# Patient Record
Sex: FEMALE | Race: WHITE | NOT HISPANIC OR LATINO | Employment: FULL TIME | URBAN - METROPOLITAN AREA
[De-identification: names, ages, dates, MRNs, and addresses within clinical notes are randomized per-mention and may not be internally consistent; named-entity substitution may affect disease eponyms.]

---

## 2018-02-22 ENCOUNTER — TELEPHONE (OUTPATIENT)
Dept: INTERNAL MEDICINE | Facility: CLINIC | Age: 48
End: 2018-02-22

## 2018-02-22 DIAGNOSIS — E78.00 PURE HYPERCHOLESTEROLEMIA: ICD-10-CM

## 2018-02-22 DIAGNOSIS — Z12.11 SCREEN FOR COLON CANCER: ICD-10-CM

## 2018-02-22 DIAGNOSIS — Z87.39 HISTORY OF GOUT: Primary | ICD-10-CM

## 2018-02-22 NOTE — TELEPHONE ENCOUNTER
----- Message from Kayce Morel sent at 2/21/2018  4:11 PM CST -----  Contact: self  Pt has an appt on Tuesday, 2/20/18.  She just had a CMP, SED RATE, CBC, HEMOGLOBIN, AND HEMATOCRIT done on 2/7/18.  What other labs does Dr Alonso want her to have done prior to her appt.  Please fax to [facility].

## 2018-02-23 LAB
CHOLEST SERPL-MCNC: 125 MG/DL
CHOLEST/HDLC SERPL: 3.5 (CALC)
COPY(IES) SENT TO:: NORMAL
HDLC SERPL-MCNC: 36 MG/DL
LDLC SERPL CALC-MCNC: 63 MG/DL (CALC)
NONHDLC SERPL-MCNC: 89 MG/DL (CALC)
TRIGL SERPL-MCNC: 182 MG/DL
URATE SERPL-MCNC: 4.9 MG/DL (ref 2.5–7)

## 2018-02-26 DIAGNOSIS — I10 HYPERTENSION, UNSPECIFIED TYPE: Primary | ICD-10-CM

## 2018-02-26 RX ORDER — LISINOPRIL AND HYDROCHLOROTHIAZIDE 10; 12.5 MG/1; MG/1
1 TABLET ORAL DAILY
Qty: 30 TABLET | Refills: 0 | Status: SHIPPED | OUTPATIENT
Start: 2018-02-26 | End: 2018-02-27 | Stop reason: SDUPTHER

## 2018-02-26 RX ORDER — LISINOPRIL 20 MG/1
20 TABLET ORAL DAILY
Qty: 30 TABLET | Refills: 0 | Status: CANCELLED | OUTPATIENT
Start: 2018-02-26

## 2018-02-26 NOTE — TELEPHONE ENCOUNTER
----- Message from Kayce Morel sent at 2/26/2018  9:02 AM CST -----  Contact: self  Pt lost her presc for lisinopril hctz.  Has been out for 2 days.  Can you please send an emergency refill for just 30 pills to [facility].

## 2018-02-26 NOTE — TELEPHONE ENCOUNTER
Start Lisinopril-Hydrochlorothiazide Tablet, 10-12.5 MG, 1 tablet, Orally, Once a day, 90 days, 90 Tablet, Refills 1

## 2018-02-27 ENCOUNTER — OFFICE VISIT (OUTPATIENT)
Dept: INTERNAL MEDICINE | Facility: CLINIC | Age: 48
End: 2018-02-27
Payer: COMMERCIAL

## 2018-02-27 VITALS
OXYGEN SATURATION: 97 % | SYSTOLIC BLOOD PRESSURE: 132 MMHG | HEART RATE: 93 BPM | HEIGHT: 62 IN | RESPIRATION RATE: 16 BRPM | WEIGHT: 249.38 LBS | BODY MASS INDEX: 45.89 KG/M2 | DIASTOLIC BLOOD PRESSURE: 84 MMHG

## 2018-02-27 DIAGNOSIS — F41.1 GAD (GENERALIZED ANXIETY DISORDER): ICD-10-CM

## 2018-02-27 DIAGNOSIS — L40.9 PSORIASIS: ICD-10-CM

## 2018-02-27 DIAGNOSIS — E78.2 MIXED HYPERLIPIDEMIA: ICD-10-CM

## 2018-02-27 DIAGNOSIS — Z23 NEED FOR TETANUS BOOSTER: ICD-10-CM

## 2018-02-27 DIAGNOSIS — Z87.39 HISTORY OF GOUT: ICD-10-CM

## 2018-02-27 DIAGNOSIS — I10 ESSENTIAL HYPERTENSION: Primary | ICD-10-CM

## 2018-02-27 DIAGNOSIS — E66.9 OBESITY, UNSPECIFIED CLASSIFICATION, UNSPECIFIED OBESITY TYPE, UNSPECIFIED WHETHER SERIOUS COMORBIDITY PRESENT: ICD-10-CM

## 2018-02-27 PROCEDURE — 3079F DIAST BP 80-89 MM HG: CPT | Mod: ,,, | Performed by: INTERNAL MEDICINE

## 2018-02-27 PROCEDURE — 3075F SYST BP GE 130 - 139MM HG: CPT | Mod: ,,, | Performed by: INTERNAL MEDICINE

## 2018-02-27 PROCEDURE — 99213 OFFICE O/P EST LOW 20 MIN: CPT | Mod: ,,, | Performed by: INTERNAL MEDICINE

## 2018-02-27 RX ORDER — ADALIMUMAB 40MG/0.8ML
KIT SUBCUTANEOUS
Refills: 3 | COMMUNITY
Start: 2018-01-12 | End: 2018-02-27

## 2018-02-27 RX ORDER — ROSUVASTATIN CALCIUM 10 MG/1
10 TABLET, COATED ORAL DAILY
Qty: 90 TABLET | Refills: 1 | Status: SHIPPED | OUTPATIENT
Start: 2018-02-27 | End: 2018-08-15 | Stop reason: SDUPTHER

## 2018-02-27 RX ORDER — FOLIC ACID 1 MG/1
1 TABLET ORAL DAILY
Qty: 90 TABLET | Refills: 1 | Status: SHIPPED | OUTPATIENT
Start: 2018-02-27 | End: 2018-08-15 | Stop reason: SDUPTHER

## 2018-02-27 RX ORDER — ROSUVASTATIN CALCIUM 20 MG/1
10 TABLET, COATED ORAL DAILY
Qty: 90 TABLET | Refills: 1 | Status: SHIPPED | OUTPATIENT
Start: 2018-02-27 | End: 2018-02-27 | Stop reason: DRUGHIGH

## 2018-02-27 RX ORDER — ALLOPURINOL 300 MG/1
300 TABLET ORAL DAILY
Qty: 90 TABLET | Refills: 1 | Status: SHIPPED | OUTPATIENT
Start: 2018-02-27 | End: 2018-08-15 | Stop reason: SDUPTHER

## 2018-02-27 RX ORDER — LISINOPRIL AND HYDROCHLOROTHIAZIDE 10; 12.5 MG/1; MG/1
1 TABLET ORAL DAILY
Qty: 30 TABLET | Refills: 0 | Status: SHIPPED | OUTPATIENT
Start: 2018-02-27 | End: 2018-03-15 | Stop reason: SDUPTHER

## 2018-02-27 RX ORDER — FOLIC ACID 1 MG/1
1 TABLET ORAL DAILY
COMMUNITY
Start: 2017-12-07 | End: 2018-02-27 | Stop reason: SDUPTHER

## 2018-02-27 RX ORDER — ALLOPURINOL 300 MG/1
300 TABLET ORAL DAILY
COMMUNITY
End: 2018-02-27 | Stop reason: SDUPTHER

## 2018-02-27 NOTE — PATIENT INSTRUCTIONS
3g fish oil daily  2g cinnamon daily  400 mg Magnesium daily  2000 mg Vitamin D3 daily  Cut down salt intake    4 Steps for Eating Healthier  Changing the way you eat can improve your health. It can lower your cholesterol and blood pressure, and help you stay at a healthy weight. Your diet doesnt have to be bland and boring to be healthy. Just watch your calories and follow these steps:    1. Eat fewer unhealthy fats  · Choose more fish and lean meats instead of fatty cuts of meat.  · Skip butter and lard, and use less margarine.  · Pass on foods that have palm, coconut, or hydrogenated oils.  · Eat fewer high-fat dairy foods like cheese, ice cream, and whole milk.  · Get a heart-healthy cookbook and try some low-fat recipes.  2. Go light on salt  · Keep the saltshaker off the table.  · Limit high-salt ingredients, such as soy sauce, bouillon, and garlic salt.  · Instead of adding salt when cooking, season your food with herbs and flavorings. Try lemon, garlic, and onion.  · Limit convenience foods, such as boxed or canned foods and restaurant food.  · Read food labels and choose lower-sodium options.  3. Limit sugar  · Pause before you add sugars to pancakes, cereal, coffee, or tea. This includes white and brown table sugar, syrup, honey, and molasses. Cut your usual amount by half.  · Use non-sugar sweeteners. Stevia, aspartame, and sucralose can satisfy a sweet tooth without adding calories.  · Swap out sugar-filled soda and other drinks. Buy sugar-free or low-calorie beverages. Remember water is always the best choice.  · Read labels and choose foods with less added sugar. Keep in mind that dairy foods and foods with fruit will have some natural sugar.  · Cut the sugar in recipes by 1/3 to 1/2. Boost the flavor with extracts like almond, vanilla, or orange. Or add spices such as cinnamon or nutmeg.  4. Eat more fiber  · Eat fresh fruits and vegetables every day.  · Boost your diet with whole grains. Go for  oats, whole-grain rice, and bran.  · Add beans and lentils to your meals.  · Drink more water to match your fiber increase. This is to help prevent constipation.  Date Last Reviewed: 5/11/2015  © 0507-5745 [employer]. [address] [place] [address], [place] [address]. All rights reserved. This information is not intended as a substitute for professional medical care. Always follow your healthcare professional's instructions.

## 2018-03-07 NOTE — PROGRESS NOTES
SUBJECTIVE:    Patient ID: Oanh Marmolejo is a 47 y.o. female.    Chief Complaint: Hyperlipidemia and Hypertension    HPI     IN FOR RECHECK- SHE IS SEEING RHEUMATOLOGY WHO IS CHANGING HER PSORIASIS RX SOON    BP --HAS BEEN GOOD--    SHE GAINED HER WEIGHT BACK AND MORE-SHE IS NOW GAMBLING WITH HER MOTHER!!!!    LABS--TRIG IS SLIGHTLY UP-183    ASKS RE HER HCTZ -DOES NOT THINK SHE IS LOSING MUCH FLUID WITH IT-BUT SHE IS NOT LIMITING HER SALT    Past Medical History:   Diagnosis Date    Arthritis     Back pain     Degenerative disc disease     LIZBETH (generalized anxiety disorder)     Gout flare     High triglycerides     History of gout     Hyperlipidemia     Hypertension     Joint pain     Obese     Pain of left calf     Psoriasis     Swelling of lower extremity      Social History     Social History    Marital status:      Spouse name: N/A    Number of children: N/A    Years of education: N/A     Occupational History    Not on file.     Social History Main Topics    Smoking status: Never Smoker    Smokeless tobacco: Never Used    Alcohol use No    Drug use: Unknown    Sexual activity: Not Currently     Other Topics Concern    Not on file     Social History Narrative    No narrative on file     Past Surgical History:   Procedure Laterality Date    NECK SURGERY       Family History   Problem Relation Age of Onset    Diabetes Father     Heart disease Father     Hyperlipidemia Father     Hypertension Father     Cancer Maternal Aunt     Cancer Paternal Uncle     Diabetes Maternal Grandmother     Diabetes Paternal Grandmother        Review of Systems   Constitutional: Negative for appetite change, chills, diaphoresis, fatigue, fever and unexpected weight change.   HENT: Negative for congestion, ear pain, hearing loss, nosebleeds, postnasal drip, sinus pain, sinus pressure, sneezing, sore throat, tinnitus, trouble swallowing and voice change.    Eyes: Negative for photophobia,  pain, itching and visual disturbance.   Respiratory: Negative for apnea, cough, chest tightness, shortness of breath, wheezing and stridor.    Cardiovascular: Positive for leg swelling (ANKLE). Negative for chest pain and palpitations.   Gastrointestinal: Negative for abdominal distention, abdominal pain, blood in stool, constipation, diarrhea, nausea and vomiting.   Endocrine: Negative for cold intolerance, heat intolerance, polydipsia and polyuria.   Genitourinary: Negative for difficulty urinating, dyspareunia, dysuria, flank pain, frequency, hematuria, menstrual problem, pelvic pain, urgency, vaginal discharge and vaginal pain.   Musculoskeletal: Negative for arthralgias, back pain, joint swelling, myalgias, neck pain and neck stiffness.   Skin: Negative for pallor.        PSORIASIS   Allergic/Immunologic: Negative for environmental allergies and food allergies.   Neurological: Positive for numbness (IN HANDS). Negative for dizziness, tremors, speech difficulty, weakness and light-headedness.   Hematological: Does not bruise/bleed easily.   Psychiatric/Behavioral: Positive for sleep disturbance (GAMBLING). Negative for agitation, confusion, decreased concentration and suicidal ideas. The patient is not nervous/anxious.           Objective:      Physical Exam   Constitutional: She is oriented to person, place, and time. She appears well-developed and well-nourished. She is cooperative. No distress.   HENT:   Head: Normocephalic and atraumatic.   Right Ear: Tympanic membrane normal.   Left Ear: Tympanic membrane normal.   Nose: Nose normal.   Mouth/Throat: Uvula is midline, oropharynx is clear and moist and mucous membranes are normal.   Eyes: Conjunctivae, EOM and lids are normal. Pupils are equal, round, and reactive to light. Right pupil is round and reactive. Left pupil is round and reactive.   Neck: Trachea normal and normal range of motion. Neck supple. No JVD present. No thyromegaly present.    Cardiovascular: Normal rate, regular rhythm, normal heart sounds and intact distal pulses.    Pulmonary/Chest: Effort normal and breath sounds normal. No tachypnea. No respiratory distress.   Abdominal: Soft. Bowel sounds are normal. There is no tenderness.   Musculoskeletal: Normal range of motion.   Lymphadenopathy:     She has no cervical adenopathy.   Neurological: She is alert and oriented to person, place, and time. She has normal strength.   Skin: Skin is warm and dry. No rash noted.   PSORIASIS SCATTERED LESIONS   Psychiatric: She has a normal mood and affect. Her speech is normal.   Nursing note and vitals reviewed.          Assessment:       1. Need for tetanus booster         Plan:           Need for tetanus booster  -     DIPH,PERTUSS,ACEL,,TET VAC,PF, ADULT (ADACEL) 2 Lf-(2.5-5-3-5 mcg)-5Lf/0.5 mL Syrg; Inject 0.5 mLs into the muscle once.  Dispense: 0.5 mL; Refill: 0    Other orders  -     allopurinol (ZYLOPRIM) 300 MG tablet; Take 1 tablet (300 mg total) by mouth once daily.  Dispense: 90 tablet; Refill: 1  -     folic acid (FOLVITE) 1 MG tablet; Take 1 tablet (1 mg total) by mouth Daily.  Dispense: 90 tablet; Refill: 1

## 2018-03-15 RX ORDER — LISINOPRIL AND HYDROCHLOROTHIAZIDE 10; 12.5 MG/1; MG/1
1 TABLET ORAL DAILY
Qty: 90 TABLET | Refills: 1 | Status: SHIPPED | OUTPATIENT
Start: 2018-03-15 | End: 2018-08-15 | Stop reason: SDUPTHER

## 2018-03-15 NOTE — TELEPHONE ENCOUNTER
----- Message from Stephanie Grijalva sent at 3/12/2018  3:55 PM CDT -----  Contact: Oanh  Pt needs 2 more months supply of lisinopril and then also a 90 day supply as well sent to [facility] on [facility]. If at all possible, she would prefer 2 90 day refills

## 2018-08-02 LAB
CHOLEST SERPL-MCNC: 137 MG/DL
CHOLEST/HDLC SERPL: 4.6 (CALC)
COPY(IES) SENT TO:: NORMAL
HDLC SERPL-MCNC: 30 MG/DL
LDLC SERPL CALC-MCNC: 79 MG/DL (CALC)
NONHDLC SERPL-MCNC: 107 MG/DL (CALC)
TRIGL SERPL-MCNC: 182 MG/DL
URATE SERPL-MCNC: 6 MG/DL (ref 2.5–7)

## 2018-08-09 ENCOUNTER — TELEPHONE (OUTPATIENT)
Dept: INTERNAL MEDICINE | Facility: CLINIC | Age: 48
End: 2018-08-09

## 2018-08-09 NOTE — TELEPHONE ENCOUNTER
----- Message from Yahaira Alonso MD sent at 8/5/2018  5:16 PM CDT -----  Please call the patient regarding her abnormal result.increase stati  To 20 mg and pend order for the 20 mg dose0-take omega 3 fish oils 3 grams daily

## 2018-08-15 ENCOUNTER — OFFICE VISIT (OUTPATIENT)
Dept: INTERNAL MEDICINE | Facility: CLINIC | Age: 48
End: 2018-08-15
Payer: COMMERCIAL

## 2018-08-15 VITALS
RESPIRATION RATE: 18 BRPM | DIASTOLIC BLOOD PRESSURE: 90 MMHG | OXYGEN SATURATION: 98 % | HEART RATE: 104 BPM | WEIGHT: 242.81 LBS | SYSTOLIC BLOOD PRESSURE: 140 MMHG | BODY MASS INDEX: 44.68 KG/M2 | HEIGHT: 62 IN

## 2018-08-15 DIAGNOSIS — L40.9 PSORIASIS: ICD-10-CM

## 2018-08-15 DIAGNOSIS — Z87.39 HISTORY OF GOUT: ICD-10-CM

## 2018-08-15 DIAGNOSIS — I10 ESSENTIAL HYPERTENSION: Primary | ICD-10-CM

## 2018-08-15 DIAGNOSIS — E78.2 MIXED HYPERLIPIDEMIA: ICD-10-CM

## 2018-08-15 DIAGNOSIS — Z12.39 BREAST CANCER SCREENING: ICD-10-CM

## 2018-08-15 DIAGNOSIS — F41.1 GAD (GENERALIZED ANXIETY DISORDER): ICD-10-CM

## 2018-08-15 PROCEDURE — 3080F DIAST BP >= 90 MM HG: CPT | Mod: ,,, | Performed by: INTERNAL MEDICINE

## 2018-08-15 PROCEDURE — 99213 OFFICE O/P EST LOW 20 MIN: CPT | Mod: ,,, | Performed by: INTERNAL MEDICINE

## 2018-08-15 PROCEDURE — 3008F BODY MASS INDEX DOCD: CPT | Mod: ,,, | Performed by: INTERNAL MEDICINE

## 2018-08-15 PROCEDURE — 3077F SYST BP >= 140 MM HG: CPT | Mod: ,,, | Performed by: INTERNAL MEDICINE

## 2018-08-15 RX ORDER — LISINOPRIL AND HYDROCHLOROTHIAZIDE 10; 12.5 MG/1; MG/1
1 TABLET ORAL DAILY
Qty: 90 TABLET | Refills: 1 | Status: CANCELLED | OUTPATIENT
Start: 2018-08-15 | End: 2019-08-15

## 2018-08-15 RX ORDER — LISINOPRIL AND HYDROCHLOROTHIAZIDE 12.5; 2 MG/1; MG/1
1 TABLET ORAL DAILY
Qty: 90 TABLET | Refills: 3 | Status: SHIPPED | OUTPATIENT
Start: 2018-08-15 | End: 2019-02-06 | Stop reason: SDUPTHER

## 2018-08-15 RX ORDER — FOLIC ACID 1 MG/1
1 TABLET ORAL DAILY
Qty: 90 TABLET | Refills: 1 | Status: SHIPPED | OUTPATIENT
Start: 2018-08-15 | End: 2019-02-06 | Stop reason: SDUPTHER

## 2018-08-15 RX ORDER — BUPROPION HYDROCHLORIDE 300 MG/1
300 TABLET ORAL DAILY
COMMUNITY
Start: 2018-08-08 | End: 2019-08-06 | Stop reason: SDUPTHER

## 2018-08-15 RX ORDER — ROSUVASTATIN CALCIUM 20 MG/1
20 TABLET, COATED ORAL DAILY
Qty: 90 TABLET | Refills: 1 | Status: SHIPPED | OUTPATIENT
Start: 2018-08-15 | End: 2019-02-06 | Stop reason: SDUPTHER

## 2018-08-15 RX ORDER — SECUKINUMAB 150 MG/ML
INJECTION SUBCUTANEOUS
Refills: 5 | COMMUNITY
Start: 2018-08-09 | End: 2020-12-23 | Stop reason: SDUPTHER

## 2018-08-15 RX ORDER — ALLOPURINOL 300 MG/1
300 TABLET ORAL DAILY
Qty: 90 TABLET | Refills: 1 | Status: SHIPPED | OUTPATIENT
Start: 2018-08-15 | End: 2019-02-06 | Stop reason: SDUPTHER

## 2018-08-15 NOTE — PROGRESS NOTES
SUBJECTIVE:    Patient ID: Oanh Marmolejo is a 48 y.o. female.    Chief Complaint: Hyperlipidemia (med refill); Hypertension (med refill); and Gout (med refill)    HPI     In for recheck of labs-doing well with psoriasis on coset-also her BP is a little up and she feels some flutters when she is a little active-lipids need adjusting    No visits with results within 3 Month(s) from this visit.   Latest known visit with results is:   Office Visit on 02/27/2018   Component Date Value Ref Range Status    Uric Acid 08/01/2018 6.0  2.5 - 7.0 mg/dL Final    Cholesterol 08/01/2018 137  <200 mg/dL Final    HDL 08/01/2018 30* >50 mg/dL Final    Triglycerides 08/01/2018 182* <150 mg/dL Final    LDL Cholesterol 08/01/2018 79  mg/dL (calc) Final    HDL/Chol Ratio 08/01/2018 4.6  <5.0 (calc) Final    Non HDL Chol. (LDL+VLDL) 08/01/2018 107  <130 mg/dL (calc) Final    Copy(ies) Sent To: 08/01/2018    Final       Past Medical History:   Diagnosis Date    Arthritis     Back pain     Degenerative disc disease     LIZBETH (generalized anxiety disorder)     Gout flare     High triglycerides     History of gout     Hyperlipidemia     Hypertension     Joint pain     Obese     Pain of left calf     Psoriasis     Swelling of lower extremity      Social History     Socioeconomic History    Marital status:      Spouse name: Not on file    Number of children: Not on file    Years of education: Not on file    Highest education level: Not on file   Social Needs    Financial resource strain: Not on file    Food insecurity - worry: Not on file    Food insecurity - inability: Not on file    Transportation needs - medical: Not on file    Transportation needs - non-medical: Not on file   Occupational History    Not on file   Tobacco Use    Smoking status: Never Smoker    Smokeless tobacco: Never Used   Substance and Sexual Activity    Alcohol use: No    Drug use: Not on file    Sexual activity: Not  "Currently   Other Topics Concern    Not on file   Social History Narrative    Not on file     Past Surgical History:   Procedure Laterality Date    NECK SURGERY       Family History   Problem Relation Age of Onset    Diabetes Father     Heart disease Father     Hyperlipidemia Father     Hypertension Father     Cancer Maternal Aunt     Cancer Paternal Uncle     Diabetes Maternal Grandmother     Diabetes Paternal Grandmother      Vitals:    08/15/18 1522   BP: (!) 140/90   Pulse: 104   Resp: 18   SpO2: 98%   Weight: 110.1 kg (242 lb 12.8 oz)   Height: 5' 2" (1.575 m)       Review of Systems       Objective:      Physical Exam        Assessment:       1. Essential hypertension    2. Mixed hyperlipidemia    3. Breast cancer screening    4. LIZBETH (generalized anxiety disorder)    5. History of gout    6. Psoriasis         Plan:           Essential hypertension  -     lisinopril-hydrochlorothiazide (PRINZIDE,ZESTORETIC) 20-12.5 mg per tablet; Take 1 tablet by mouth once daily.  Dispense: 90 tablet; Refill: 3    Mixed hyperlipidemia  -     allopurinol (ZYLOPRIM) 300 MG tablet; Take 1 tablet (300 mg total) by mouth once daily.  Dispense: 90 tablet; Refill: 1  -     rosuvastatin (CRESTOR) 20 MG tablet; Take 1 tablet (20 mg total) by mouth once daily.  Dispense: 90 tablet; Refill: 1  -     Lipid panel; Future; Expected date: 02/15/2019    Breast cancer screening  -     Mammo Digital Diagnostic Bilat with Mike; Future; Expected date: 08/15/2018    LIZBETH (generalized anxiety disorder)    History of gout  -     Uric acid; Future; Expected date: 02/15/2019    Psoriasis        -     Per Rheumatology    Other orders  -     Cancel: lisinopril-hydrochlorothiazide (PRINZIDE,ZESTORETIC) 10-12.5 mg per tablet; Take 1 tablet by mouth once daily.  Dispense: 90 tablet; Refill: 1  -     folic acid (FOLVITE) 1 MG tablet; Take 1 tablet (1 mg total) by mouth Daily.  Dispense: 90 tablet; Refill: 1      "

## 2018-09-12 ENCOUNTER — TELEPHONE (OUTPATIENT)
Dept: FAMILY MEDICINE | Facility: CLINIC | Age: 48
End: 2018-09-12

## 2018-09-12 DIAGNOSIS — Z12.39 BREAST CANCER SCREENING: Primary | ICD-10-CM

## 2018-10-01 ENCOUNTER — TELEPHONE (OUTPATIENT)
Dept: FAMILY MEDICINE | Facility: CLINIC | Age: 48
End: 2018-10-01

## 2018-10-02 ENCOUNTER — TELEPHONE (OUTPATIENT)
Dept: FAMILY MEDICINE | Facility: CLINIC | Age: 48
End: 2018-10-02

## 2018-10-02 NOTE — TELEPHONE ENCOUNTER
PATIENT WAS IN A MEETING AND SHE COULDN'T MAKE IT TO HER APPOINTMENT RESCHEDULED HER FOR NEXT WEEK.

## 2018-10-10 ENCOUNTER — OFFICE VISIT (OUTPATIENT)
Dept: FAMILY MEDICINE | Facility: CLINIC | Age: 48
End: 2018-10-10
Payer: COMMERCIAL

## 2018-10-10 VITALS
WEIGHT: 242 LBS | SYSTOLIC BLOOD PRESSURE: 124 MMHG | RESPIRATION RATE: 16 BRPM | TEMPERATURE: 98 F | DIASTOLIC BLOOD PRESSURE: 76 MMHG | HEIGHT: 62 IN | BODY MASS INDEX: 44.53 KG/M2 | OXYGEN SATURATION: 98 % | HEART RATE: 100 BPM

## 2018-10-10 DIAGNOSIS — I47.10 PSVT (PAROXYSMAL SUPRAVENTRICULAR TACHYCARDIA): Primary | ICD-10-CM

## 2018-10-10 DIAGNOSIS — Z12.4 CERVICAL CANCER SCREENING: ICD-10-CM

## 2018-10-10 PROCEDURE — 3008F BODY MASS INDEX DOCD: CPT | Mod: ,,, | Performed by: INTERNAL MEDICINE

## 2018-10-10 PROCEDURE — 99214 OFFICE O/P EST MOD 30 MIN: CPT | Mod: ,,, | Performed by: INTERNAL MEDICINE

## 2018-10-10 PROCEDURE — 3078F DIAST BP <80 MM HG: CPT | Mod: ,,, | Performed by: INTERNAL MEDICINE

## 2018-10-10 PROCEDURE — 3074F SYST BP LT 130 MM HG: CPT | Mod: ,,, | Performed by: INTERNAL MEDICINE

## 2018-10-10 NOTE — PROGRESS NOTES
SUBJECTIVE:    Patient ID: Oanh Marmolejo is a 48 y.o. female.    Chief Complaint: Hypertension (pt was awakened the last 2 nights from her heart racing); Anxiety; and Follow-up    HPI     Patient has been under some domestic stress-she awakened with heart pounding in the middle of the night and the next night it happened again-she has hyperlipidemia and is concerned about her heart-no actual chest discomfort-she did some breathing and talking to herself to make the sx go away but the experiences were scary and she needs to know the heart is good-she has elevated triglycerides and a low HDL-Oanh is overweight-she does have TN and also a history of gout.    No visits with results within 6 Month(s) from this visit.   Latest known visit with results is:   Office Visit on 02/27/2018   Component Date Value Ref Range Status    Uric Acid 08/01/2018 6.0  2.5 - 7.0 mg/dL Final    Cholesterol 08/01/2018 137  <200 mg/dL Final    HDL 08/01/2018 30* >50 mg/dL Final    Triglycerides 08/01/2018 182* <150 mg/dL Final    LDL Cholesterol 08/01/2018 79  mg/dL (calc) Final    HDL/Chol Ratio 08/01/2018 4.6  <5.0 (calc) Final    Non HDL Chol. (LDL+VLDL) 08/01/2018 107  <130 mg/dL (calc) Final    Copy(ies) Sent To: 08/01/2018    Final       Past Medical History:   Diagnosis Date    Arthritis     Back pain     Degenerative disc disease     LIZBETH (generalized anxiety disorder)     Gout flare     High triglycerides     History of gout     Hyperlipidemia     Hypertension     Joint pain     Obese     Pain of left calf     Psoriasis     Swelling of lower extremity      Social History     Socioeconomic History    Marital status:      Spouse name: Not on file    Number of children: Not on file    Years of education: Not on file    Highest education level: Not on file   Social Needs    Financial resource strain: Not on file    Food insecurity - worry: Not on file    Food insecurity - inability: Not on file  "   Transportation needs - medical: Not on file    Transportation needs - non-medical: Not on file   Occupational History    Not on file   Tobacco Use    Smoking status: Never Smoker    Smokeless tobacco: Never Used   Substance and Sexual Activity    Alcohol use: No    Drug use: No    Sexual activity: Not Currently   Other Topics Concern    Not on file   Social History Narrative    Not on file     Past Surgical History:   Procedure Laterality Date    NECK SURGERY       Family History   Problem Relation Age of Onset    Diabetes Father     Heart disease Father     Hyperlipidemia Father     Hypertension Father     Cancer Maternal Aunt     Cancer Paternal Uncle     Diabetes Maternal Grandmother     Diabetes Paternal Grandmother      Vitals:    10/10/18 1428   BP: 124/76   Pulse: 100   Resp: 16   Temp: 98.4 °F (36.9 °C)   SpO2: 98%   Weight: 109.8 kg (242 lb)   Height: 5' 2" (1.575 m)       Review of Systems   Constitutional: Negative for chills, fatigue, fever and unexpected weight change.   HENT: Negative for congestion, ear pain, hearing loss, sinus pain and sore throat.    Eyes: Negative for pain and visual disturbance.   Respiratory: Negative for cough, shortness of breath and wheezing.    Cardiovascular: Negative for chest pain, palpitations and leg swelling.        HPI   Gastrointestinal: Negative for abdominal pain, blood in stool, constipation, diarrhea, nausea and vomiting.   Endocrine: Negative for cold intolerance and heat intolerance.   Genitourinary: Negative for difficulty urinating, dysuria, frequency, pelvic pain and urgency.   Musculoskeletal: Negative for back pain, joint swelling and neck pain.   Skin: Negative for pallor and rash.   Neurological: Negative for dizziness, tremors, weakness, numbness and headaches.   Hematological: Does not bruise/bleed easily.   Psychiatric/Behavioral: Negative for agitation, sleep disturbance and suicidal ideas.          Objective:      Physical Exam "   Constitutional: She is oriented to person, place, and time. She appears well-developed and well-nourished. She is cooperative.   BMI   HENT:   Head: Normocephalic and atraumatic.   Right Ear: Tympanic membrane normal.   Left Ear: Tympanic membrane normal.   Eyes: Conjunctivae and EOM are normal. Right pupil is round and reactive. Left pupil is round and reactive.   Neck: Trachea normal and normal range of motion. Neck supple.   Cardiovascular: Normal rate, regular rhythm, S1 normal, S2 normal, normal heart sounds and intact distal pulses.   Pulmonary/Chest: Breath sounds normal.   Abdominal: Soft. Bowel sounds are normal. There is no rigidity and no guarding.   Musculoskeletal: Normal range of motion.   Lymphadenopathy:     She has no cervical adenopathy.     She has no axillary adenopathy.   Neurological: She is alert and oriented to person, place, and time.   Skin: Skin is warm and dry. Capillary refill takes less than 2 seconds.   Psychiatric: She has a normal mood and affect. Her behavior is normal. Judgment and thought content normal.   Nursing note and vitals reviewed.          Assessment:       1. PSVT (paroxysmal supraventricular tachycardia)    2. BMI 40.0-44.9, adult    3. Cervical cancer screening         Plan:           PSVT (paroxysmal supraventricular tachycardia)  -     IN OFFICE EKG 12-LEAD (to [facility])  -     Cardiac treadmill stress test; Future; Expected date: 10/15/2018    BMI 40.0-44.9, adult    Cervical cancer screening  -     Ambulatory referral to Gynecology    Other orders  -     Cancel: Ambulatory referral to Obstetrics / Gynecology

## 2018-10-15 ENCOUNTER — CLINICAL SUPPORT (OUTPATIENT)
Dept: OTHER | Facility: CLINIC | Age: 48
End: 2018-10-15
Payer: COMMERCIAL

## 2018-10-15 DIAGNOSIS — Z00.8 ENCOUNTER FOR OTHER GENERAL EXAMINATION: ICD-10-CM

## 2018-10-15 PROCEDURE — 80061 LIPID PANEL: CPT | Mod: QW,S$GLB,, | Performed by: INTERNAL MEDICINE

## 2018-10-15 PROCEDURE — 82947 ASSAY GLUCOSE BLOOD QUANT: CPT | Mod: QW,S$GLB,, | Performed by: INTERNAL MEDICINE

## 2018-10-15 PROCEDURE — 99401 PREV MED CNSL INDIV APPRX 15: CPT | Mod: S$GLB,,, | Performed by: INTERNAL MEDICINE

## 2018-10-16 VITALS — HEIGHT: 62 IN | BODY MASS INDEX: 44.26 KG/M2

## 2018-10-16 LAB
HDLC SERPL-MCNC: 33 MG/DL
POC CHOLESTEROL, LDL (DOCK): 50 MG/DL
POC CHOLESTEROL, TOTAL: 120 MG/DL
POC GLUCOSE, FASTING: 91 MG/DL
TRIGL SERPL-MCNC: 186 MG/DL

## 2019-01-31 LAB
CHOLEST SERPL-MCNC: 116 MG/DL
CHOLEST/HDLC SERPL: 3.6 (CALC)
HDLC SERPL-MCNC: 32 MG/DL
LDLC SERPL CALC-MCNC: 56 MG/DL (CALC)
NONHDLC SERPL-MCNC: 84 MG/DL (CALC)
TRIGL SERPL-MCNC: 226 MG/DL
URATE SERPL-MCNC: 5.3 MG/DL (ref 2.5–7)

## 2019-02-06 ENCOUNTER — OFFICE VISIT (OUTPATIENT)
Dept: FAMILY MEDICINE | Facility: CLINIC | Age: 49
End: 2019-02-06
Payer: COMMERCIAL

## 2019-02-06 VITALS
DIASTOLIC BLOOD PRESSURE: 78 MMHG | HEIGHT: 62 IN | HEART RATE: 98 BPM | SYSTOLIC BLOOD PRESSURE: 126 MMHG | OXYGEN SATURATION: 98 % | TEMPERATURE: 98 F | WEIGHT: 239 LBS | RESPIRATION RATE: 14 BRPM | BODY MASS INDEX: 43.98 KG/M2

## 2019-02-06 DIAGNOSIS — I10 ESSENTIAL HYPERTENSION: Primary | ICD-10-CM

## 2019-02-06 DIAGNOSIS — E78.2 MIXED HYPERLIPIDEMIA: ICD-10-CM

## 2019-02-06 DIAGNOSIS — E79.0 HYPERURICEMIA: ICD-10-CM

## 2019-02-06 PROCEDURE — 3008F BODY MASS INDEX DOCD: CPT | Mod: ,,, | Performed by: INTERNAL MEDICINE

## 2019-02-06 PROCEDURE — 3074F SYST BP LT 130 MM HG: CPT | Mod: ,,, | Performed by: INTERNAL MEDICINE

## 2019-02-06 PROCEDURE — 99213 PR OFFICE/OUTPT VISIT, EST, LEVL III, 20-29 MIN: ICD-10-PCS | Mod: ,,, | Performed by: INTERNAL MEDICINE

## 2019-02-06 PROCEDURE — 3074F PR MOST RECENT SYSTOLIC BLOOD PRESSURE < 130 MM HG: ICD-10-PCS | Mod: ,,, | Performed by: INTERNAL MEDICINE

## 2019-02-06 PROCEDURE — 99213 OFFICE O/P EST LOW 20 MIN: CPT | Mod: ,,, | Performed by: INTERNAL MEDICINE

## 2019-02-06 PROCEDURE — 3078F DIAST BP <80 MM HG: CPT | Mod: ,,, | Performed by: INTERNAL MEDICINE

## 2019-02-06 PROCEDURE — 3008F PR BODY MASS INDEX (BMI) DOCUMENTED: ICD-10-PCS | Mod: ,,, | Performed by: INTERNAL MEDICINE

## 2019-02-06 PROCEDURE — 3078F PR MOST RECENT DIASTOLIC BLOOD PRESSURE < 80 MM HG: ICD-10-PCS | Mod: ,,, | Performed by: INTERNAL MEDICINE

## 2019-02-06 RX ORDER — ALLOPURINOL 300 MG/1
300 TABLET ORAL DAILY
Qty: 90 TABLET | Refills: 1 | Status: SHIPPED | OUTPATIENT
Start: 2019-02-06 | End: 2019-08-06 | Stop reason: SDUPTHER

## 2019-02-06 RX ORDER — LISINOPRIL AND HYDROCHLOROTHIAZIDE 12.5; 2 MG/1; MG/1
1 TABLET ORAL DAILY
Qty: 90 TABLET | Refills: 3 | Status: SHIPPED | OUTPATIENT
Start: 2019-02-06 | End: 2019-08-06 | Stop reason: SDUPTHER

## 2019-02-06 RX ORDER — ROSUVASTATIN CALCIUM 20 MG/1
20 TABLET, COATED ORAL DAILY
Qty: 90 TABLET | Refills: 1 | Status: SHIPPED | OUTPATIENT
Start: 2019-02-06 | End: 2019-08-06 | Stop reason: SDUPTHER

## 2019-02-06 RX ORDER — FOLIC ACID 1 MG/1
1 TABLET ORAL DAILY
Qty: 90 TABLET | Refills: 1 | Status: SHIPPED | OUTPATIENT
Start: 2019-02-06 | End: 2019-08-06 | Stop reason: SDUPTHER

## 2019-02-06 NOTE — PROGRESS NOTES
SUBJECTIVE:    Patient ID: Oanh Marmolejo is a 48 y.o. female.    Chief Complaint: Hypertension; Anxiety; and Follow-up    HPI     Patient in for recheck of hypertension and hyperlipidemia. Last triglycerides were 226. Her total cholesterol was 100 and HDL of 32 and an LDL of 56.    Pt and I discussed her weight loss program=-she had lost a good deal of weight on the low carb diet and now plans to restart the diet -       Clinical Support on 10/15/2018   Component Date Value Ref Range Status    POC Cholesterol, Total 10/15/2018 120  <240 MG/DL Final    POC Cholesterol, HDL 10/15/2018 33* MG/DL Final    POC Cholesterol, LDL 10/15/2018 50  <160 MG/DL Final    POC Triglycerides 10/15/2018 186* <160 MG/DL Final    POC Glucose, Fasting 10/15/2018 91  60 - 110 MG/DL Final       Past Medical History:   Diagnosis Date    Arthritis     Back pain     Degenerative disc disease     LIZBETH (generalized anxiety disorder)     Gout flare     High triglycerides     History of gout     Hyperlipidemia     Hypertension     Joint pain     Obese     Pain of left calf     Psoriasis     Swelling of lower extremity      Past Surgical History:   Procedure Laterality Date    NECK SURGERY       Family History   Problem Relation Age of Onset    Diabetes Father     Heart disease Father     Hyperlipidemia Father     Hypertension Father     Cancer Maternal Aunt     Cancer Paternal Uncle     Diabetes Maternal Grandmother     Diabetes Paternal Grandmother        Marital Status:   Alcohol History:  reports that she does not drink alcohol.  Tobacco History:  reports that  has never smoked. she has never used smokeless tobacco.  Drug History:  reports that she does not use drugs.  Sexual History:   Social History     Substance and Sexual Activity   Sexual Activity Not Currently     Sexual Orientation:Sexual Orientation: heterosexual    Review of patient's allergies indicates:  No Known Allergies  Current Outpatient  Medications   Medication Sig Dispense Refill    allopurinol (ZYLOPRIM) 300 MG tablet Take 1 tablet (300 mg total) by mouth once daily. 90 tablet 1    buPROPion (WELLBUTRIN XL) 300 MG 24 hr tablet Take 300 mg by mouth once daily.       COSENTYX PEN, 2 PENS, 150 mg/mL PnIj INJECT 300MG (2 PENS) INTO THE SKIN ONCE A MONTH  5    folic acid (FOLVITE) 1 MG tablet Take 1 tablet (1 mg total) by mouth Daily. 90 tablet 1    lisinopril-hydrochlorothiazide (PRINZIDE,ZESTORETIC) 20-12.5 mg per tablet Take 1 tablet by mouth once daily. 90 tablet 3    rosuvastatin (CRESTOR) 20 MG tablet Take 1 tablet (20 mg total) by mouth once daily. 90 tablet 1     No current facility-administered medications for this visit.        Review of Systems   Constitutional: Negative for chills, fatigue, fever and unexpected weight change.   HENT: Negative for congestion, ear pain, hearing loss, sinus pain and sore throat.    Eyes: Negative for pain and visual disturbance.   Respiratory: Negative for cough, shortness of breath and wheezing.    Cardiovascular: Negative for chest pain, palpitations (with anxiety) and leg swelling.   Gastrointestinal: Negative for abdominal pain, blood in stool, constipation, diarrhea, nausea and vomiting.   Endocrine: Negative for cold intolerance and heat intolerance.   Genitourinary: Negative for difficulty urinating, dysuria, frequency, pelvic pain and urgency.   Musculoskeletal: Positive for arthralgias (psoriatic arthritis). Negative for back pain (psoriatic...), joint swelling and neck pain.   Skin: Positive for rash (psorrasis imporoved with cosentyx). Negative for pallor.   Neurological: Negative for dizziness, tremors, weakness, numbness and headaches.   Hematological: Does not bruise/bleed easily.   Psychiatric/Behavioral: Negative for agitation, sleep disturbance and suicidal ideas. The patient is nervous/anxious (surrounds time deadlines).           Objective:        Physical Exam   Constitutional: She  is oriented to person, place, and time. She appears well-developed. She is cooperative.   increased BMI   HENT:   Head: Normocephalic and atraumatic.   Right Ear: Tympanic membrane normal.   Left Ear: Tympanic membrane normal.   Eyes: Conjunctivae, EOM and lids are normal. Right pupil is round and reactive. Left pupil is round and reactive.   Neck: Trachea normal and normal range of motion. Neck supple. No JVD present. Carotid bruit is not present. No thyromegaly present.   Cardiovascular: Normal rate, regular rhythm, S1 normal, S2 normal, normal heart sounds and intact distal pulses. Exam reveals no gallop and no friction rub.   No murmur heard.  Pulmonary/Chest: Breath sounds normal. No respiratory distress. She has no wheezes. She has no rales.   Abdominal: Soft. Bowel sounds are normal. She exhibits no mass. There is no tenderness. There is no rigidity and no guarding.   Musculoskeletal: Normal range of motion. She exhibits no edema (some ankle edema).   Neurological: She is alert and oriented to person, place, and time.   Skin: Skin is warm and dry. Capillary refill takes less than 2 seconds. No lesion and no rash noted. There is cyanosis. Nails show no clubbing.   Psychiatric: She has a normal mood and affect. Her behavior is normal. Judgment and thought content normal.   Nursing note and vitals reviewed.          Assessment:       1. Essential hypertension    2. Mixed hyperlipidemia    3. BMI 40.0-44.9, adult    4. Hyperuricemia         Plan:       Essential hypertension  -     folic acid (FOLVITE) 1 MG tablet; Take 1 tablet (1 mg total) by mouth Daily.  Dispense: 90 tablet; Refill: 1  -     lisinopril-hydrochlorothiazide (PRINZIDE,ZESTORETIC) 20-12.5 mg per tablet; Take 1 tablet by mouth once daily.  Dispense: 90 tablet; Refill: 3    Mixed hyperlipidemia  -     allopurinol (ZYLOPRIM) 300 MG tablet; Take 1 tablet (300 mg total) by mouth once daily.  Dispense: 90 tablet; Refill: 1  -     rosuvastatin (CRESTOR)  20 MG tablet; Take 1 tablet (20 mg total) by mouth once daily.  Dispense: 90 tablet; Refill: 1  -     Lipid panel; Future; Expected date: 08/06/2019  -     Glucose; Future; Expected date: 08/05/2019    BMI 40.0-44.9, adult    Hyperuricemia  -     Uric acid; Future; Expected date: 08/05/2019      Follow-up in about 6 months (around 8/6/2019).        2/7/2019 Yahaira Alonso M.D.

## 2019-02-07 ENCOUNTER — TELEPHONE (OUTPATIENT)
Dept: FAMILY MEDICINE | Facility: CLINIC | Age: 49
End: 2019-02-07

## 2019-02-07 DIAGNOSIS — E78.2 MIXED HYPERLIPIDEMIA: Primary | ICD-10-CM

## 2019-02-07 NOTE — TELEPHONE ENCOUNTER
----- Message from Yahaira Alonso MD sent at 2/5/2019  4:46 PM CST -----  NEEDS FENOFIBRATE FOR ELEVATED TRIGLYCERIDES 64 MG ONLY AND RECHECK LEVELS IN 6 MONTHS-CHOLESTEROL GOOD  ----- Message -----  From: Mely Oliva LPN  Sent: 2/4/2019   8:19 AM  To: Yahaira Alonso MD        ----- Message -----  From: Myrtle Pratt  Sent: 1/31/2019   9:46 AM  To: Gavin Syed Staff    [facility], [facility], 1/30/19

## 2019-07-27 LAB
CHOLEST SERPL-MCNC: 108 MG/DL
CHOLEST/HDLC SERPL: 3.6 (CALC)
HDLC SERPL-MCNC: 30 MG/DL
LDLC SERPL CALC-MCNC: 54 MG/DL (CALC)
NONHDLC SERPL-MCNC: 78 MG/DL (CALC)
TRIGL SERPL-MCNC: 163 MG/DL

## 2019-07-30 ENCOUNTER — TELEPHONE (OUTPATIENT)
Dept: FAMILY MEDICINE | Facility: CLINIC | Age: 49
End: 2019-07-30

## 2019-07-30 NOTE — TELEPHONE ENCOUNTER
----- Message from Yahaira Alonso MD sent at 7/27/2019  6:24 PM CDT -----  Please call the patient regarding her abnormal result.Improving-continue to check-recheck in 6 months

## 2019-08-06 ENCOUNTER — OFFICE VISIT (OUTPATIENT)
Dept: FAMILY MEDICINE | Facility: CLINIC | Age: 49
End: 2019-08-06
Payer: COMMERCIAL

## 2019-08-06 VITALS
DIASTOLIC BLOOD PRESSURE: 72 MMHG | HEART RATE: 83 BPM | WEIGHT: 222 LBS | SYSTOLIC BLOOD PRESSURE: 130 MMHG | BODY MASS INDEX: 40.85 KG/M2 | HEIGHT: 62 IN | OXYGEN SATURATION: 97 %

## 2019-08-06 DIAGNOSIS — L40.9 PSORIASIS: ICD-10-CM

## 2019-08-06 DIAGNOSIS — E78.2 MIXED HYPERLIPIDEMIA: ICD-10-CM

## 2019-08-06 DIAGNOSIS — Z87.39 HISTORY OF GOUT: ICD-10-CM

## 2019-08-06 DIAGNOSIS — I10 ESSENTIAL HYPERTENSION: Primary | ICD-10-CM

## 2019-08-06 DIAGNOSIS — F41.1 GAD (GENERALIZED ANXIETY DISORDER): ICD-10-CM

## 2019-08-06 PROCEDURE — 99999 PR PBB SHADOW E&M-EST. PATIENT-LVL IV: CPT | Mod: PBBFAC,,, | Performed by: INTERNAL MEDICINE

## 2019-08-06 PROCEDURE — 3008F BODY MASS INDEX DOCD: CPT | Mod: S$GLB,,, | Performed by: INTERNAL MEDICINE

## 2019-08-06 PROCEDURE — 99999 PR PBB SHADOW E&M-EST. PATIENT-LVL IV: ICD-10-PCS | Mod: PBBFAC,,, | Performed by: INTERNAL MEDICINE

## 2019-08-06 PROCEDURE — 3078F DIAST BP <80 MM HG: CPT | Mod: S$GLB,,, | Performed by: INTERNAL MEDICINE

## 2019-08-06 PROCEDURE — 3075F PR MOST RECENT SYSTOLIC BLOOD PRESS GE 130-139MM HG: ICD-10-PCS | Mod: S$GLB,,, | Performed by: INTERNAL MEDICINE

## 2019-08-06 PROCEDURE — 3075F SYST BP GE 130 - 139MM HG: CPT | Mod: S$GLB,,, | Performed by: INTERNAL MEDICINE

## 2019-08-06 PROCEDURE — 99213 PR OFFICE/OUTPT VISIT, EST, LEVL III, 20-29 MIN: ICD-10-PCS | Mod: S$GLB,,, | Performed by: INTERNAL MEDICINE

## 2019-08-06 PROCEDURE — 3008F PR BODY MASS INDEX (BMI) DOCUMENTED: ICD-10-PCS | Mod: S$GLB,,, | Performed by: INTERNAL MEDICINE

## 2019-08-06 PROCEDURE — 99213 OFFICE O/P EST LOW 20 MIN: CPT | Mod: S$GLB,,, | Performed by: INTERNAL MEDICINE

## 2019-08-06 PROCEDURE — 3078F PR MOST RECENT DIASTOLIC BLOOD PRESSURE < 80 MM HG: ICD-10-PCS | Mod: S$GLB,,, | Performed by: INTERNAL MEDICINE

## 2019-08-06 RX ORDER — FOLIC ACID 1 MG/1
1 TABLET ORAL DAILY
Qty: 90 TABLET | Refills: 1 | Status: SHIPPED | OUTPATIENT
Start: 2019-08-06 | End: 2020-02-12 | Stop reason: SDUPTHER

## 2019-08-06 RX ORDER — BUPROPION HYDROCHLORIDE 300 MG/1
300 TABLET ORAL DAILY
Qty: 90 TABLET | Refills: 1 | Status: SHIPPED | OUTPATIENT
Start: 2019-08-06 | End: 2020-03-04

## 2019-08-06 RX ORDER — ROSUVASTATIN CALCIUM 20 MG/1
20 TABLET, COATED ORAL DAILY
Qty: 90 TABLET | Refills: 1 | Status: SHIPPED | OUTPATIENT
Start: 2019-08-06 | End: 2020-02-12 | Stop reason: SDUPTHER

## 2019-08-06 RX ORDER — LISINOPRIL AND HYDROCHLOROTHIAZIDE 12.5; 2 MG/1; MG/1
1 TABLET ORAL DAILY
Qty: 90 TABLET | Refills: 3 | Status: SHIPPED | OUTPATIENT
Start: 2019-08-06 | End: 2020-02-12 | Stop reason: SDUPTHER

## 2019-08-06 RX ORDER — ALLOPURINOL 300 MG/1
300 TABLET ORAL DAILY
Qty: 90 TABLET | Refills: 1 | Status: SHIPPED | OUTPATIENT
Start: 2019-08-06 | End: 2020-02-12 | Stop reason: SDUPTHER

## 2019-08-06 NOTE — PROGRESS NOTES
"  SUBJECTIVE:    Patient ID: Oanh Marmolejo is a 49 y.o. female.    Chief Complaint: Hypertension (rx refills)    HPI     In for follow-up of lipids and weight.    Lipids are controlled-triglycerides are 163-( prior 226)-SHE HAS LOST 17 POUNDS ON WEIGH WATCHERS IN 3 MONTH-pt had some labs done by a Dr Redmond    Psoriasis -some problems on the hands-still on cosentyx    Last 3 sets of Vitals    Vitals - 1 value per visit 10/15/2018 2/6/2019 8/6/2019   SYSTOLIC - 126 130   DIASTOLIC - 78 72   PULSE - 98 83   TEMPERATURE - 98.1 -   RESPIRATIONS - 14 -   SPO2 - 98 97   Weight (lb) - 239 222   Weight (kg) - 108.41 100.699   HEIGHT 5' 2" 5' 2" 5' 2"   BODY MASS INDEX 44.26 43.71 40.6   VISIT REPORT - - -   Waist Measurements 48 - -   Pain Score  - 0 -     Telephone on 02/07/2019   Component Date Value Ref Range Status    Cholesterol 07/26/2019 108  <200 mg/dL Final    HDL 07/26/2019 30* >50 mg/dL Final    Triglycerides 07/26/2019 163* <150 mg/dL Final    LDL Cholesterol 07/26/2019 54  mg/dL (calc) Final    Hdl/Cholesterol Ratio 07/26/2019 3.6  <5.0 (calc) Final    Non HDL Chol. (LDL+VLDL) 07/26/2019 78  <130 mg/dL (calc) Final       Past Medical History:   Diagnosis Date    Arthritis     Back pain     Degenerative disc disease     LIZBETH (generalized anxiety disorder)     Gout flare     High triglycerides     History of gout     Hyperlipidemia     Hypertension     Joint pain     Obese     Pain of left calf     Psoriasis     Swelling of lower extremity      Past Surgical History:   Procedure Laterality Date    NECK SURGERY       Family History   Problem Relation Age of Onset    Diabetes Father     Heart disease Father     Hyperlipidemia Father     Hypertension Father     Cancer Maternal Aunt     Cancer Paternal Uncle     Diabetes Maternal Grandmother     Diabetes Paternal Grandmother        Marital Status:   Alcohol History:  reports that she does not drink alcohol.  Tobacco History:  " "reports that she has never smoked. She has never used smokeless tobacco.  Drug History:  reports that she does not use drugs.    Review of patient's allergies indicates:  No Known Allergies    Current Outpatient Medications:     allopurinol (ZYLOPRIM) 300 MG tablet, Take 1 tablet (300 mg total) by mouth once daily., Disp: 90 tablet, Rfl: 1    buPROPion (WELLBUTRIN XL) 300 MG 24 hr tablet, Take 1 tablet (300 mg total) by mouth once daily., Disp: 90 tablet, Rfl: 1    COSENTYX PEN, 2 PENS, 150 mg/mL PnIj, INJECT 300MG (2 PENS) INTO THE SKIN ONCE A MONTH, Disp: , Rfl: 5    folic acid (FOLVITE) 1 MG tablet, Take 1 tablet (1 mg total) by mouth Daily., Disp: 90 tablet, Rfl: 1    lisinopril-hydrochlorothiazide (PRINZIDE,ZESTORETIC) 20-12.5 mg per tablet, Take 1 tablet by mouth once daily., Disp: 90 tablet, Rfl: 3    rosuvastatin (CRESTOR) 20 MG tablet, Take 1 tablet (20 mg total) by mouth once daily., Disp: 90 tablet, Rfl: 1    Review of Systems   All other systems reviewed and are negative.         Objective:      Vitals:    08/06/19 1626   BP: 130/72   Pulse: 83   SpO2: 97%   Weight: 100.7 kg (222 lb)   Height: 5' 2" (1.575 m)     Physical Exam   Constitutional: She is oriented to person, place, and time. She appears well-developed and well-nourished. She is cooperative.   Increased BMI   HENT:   Right Ear: Tympanic membrane normal.   Left Ear: Tympanic membrane normal.   Eyes: Conjunctivae, EOM and lids are normal. Right pupil is round and reactive. Left pupil is round and reactive.   Neck: Trachea normal and normal range of motion. Neck supple. No JVD present. Carotid bruit is not present. No thyromegaly present.   Cardiovascular: Normal rate, regular rhythm, S1 normal, S2 normal, normal heart sounds and intact distal pulses. Exam reveals no gallop and no friction rub.   No murmur heard.  Pulmonary/Chest: Breath sounds normal. No respiratory distress. She has no wheezes. She has no rales.   Abdominal: Soft. Bowel " sounds are normal. She exhibits no mass. There is no tenderness. There is no rigidity and no guarding.   Musculoskeletal: She exhibits no edema.   Neurological: She is alert and oriented to person, place, and time.   Skin: Skin is warm and dry. Capillary refill takes less than 2 seconds. No lesion and no rash noted. Nails show no clubbing.   Psychiatric: She has a normal mood and affect. Her behavior is normal. Judgment and thought content normal.   Nursing note and vitals reviewed.        Assessment:       1. Essential hypertension    2. Mixed hyperlipidemia    3. Psoriasis    4. LIZBETH (generalized anxiety disorder)    5. History of gout    6. BMI 40.0-44.9, adult         Plan:       Essential hypertension  -     CBC auto differential; Future; Expected date: 08/06/2019  -     Comprehensive metabolic panel; Future; Expected date: 08/06/2019  -     Urinalysis  -     folic acid (FOLVITE) 1 MG tablet; Take 1 tablet (1 mg total) by mouth Daily.  Dispense: 90 tablet; Refill: 1  -     lisinopril-hydrochlorothiazide (PRINZIDE,ZESTORETIC) 20-12.5 mg per tablet; Take 1 tablet by mouth once daily.  Dispense: 90 tablet; Refill: 3    Mixed hyperlipidemia  -     allopurinol (ZYLOPRIM) 300 MG tablet; Take 1 tablet (300 mg total) by mouth once daily.  Dispense: 90 tablet; Refill: 1  -     rosuvastatin (CRESTOR) 20 MG tablet; Take 1 tablet (20 mg total) by mouth once daily.  Dispense: 90 tablet; Refill: 1    Psoriasis  -     Ambulatory referral to Dermatology    LIZBETH (generalized anxiety disorder)  -     buPROPion (WELLBUTRIN XL) 300 MG 24 hr tablet; Take 1 tablet (300 mg total) by mouth once daily.  Dispense: 90 tablet; Refill: 1    History of gout  -     Uric acid; Future; Expected date: 08/06/2019  -     Comprehensive metabolic panel; Future; Expected date: 08/06/2019  -     Urinalysis    BMI 40.0-44.9, adult      No follow-ups on file.        8/12/2019 Yahaira Alonso M.D.

## 2019-10-14 ENCOUNTER — CLINICAL SUPPORT (OUTPATIENT)
Dept: OTHER | Facility: CLINIC | Age: 49
End: 2019-10-14
Payer: COMMERCIAL

## 2019-10-14 DIAGNOSIS — Z00.8 ENCOUNTER FOR OTHER GENERAL EXAMINATION: ICD-10-CM

## 2019-10-14 PROCEDURE — 82947 ASSAY GLUCOSE BLOOD QUANT: CPT | Mod: QW,S$GLB,, | Performed by: INTERNAL MEDICINE

## 2019-10-14 PROCEDURE — 80061 PR  LIPID PANEL: ICD-10-PCS | Mod: QW,S$GLB,, | Performed by: INTERNAL MEDICINE

## 2019-10-14 PROCEDURE — 82947 PR  ASSAY QUANTITATIVE,BLOOD GLUCOSE: ICD-10-PCS | Mod: QW,S$GLB,, | Performed by: INTERNAL MEDICINE

## 2019-10-14 PROCEDURE — 99401 PR PREVENT COUNSEL,INDIV,15 MIN: ICD-10-PCS | Mod: S$GLB,,, | Performed by: INTERNAL MEDICINE

## 2019-10-14 PROCEDURE — 99401 PREV MED CNSL INDIV APPRX 15: CPT | Mod: S$GLB,,, | Performed by: INTERNAL MEDICINE

## 2019-10-14 PROCEDURE — 80061 LIPID PANEL: CPT | Mod: QW,S$GLB,, | Performed by: INTERNAL MEDICINE

## 2019-10-15 VITALS — BODY MASS INDEX: 40.6 KG/M2 | HEIGHT: 62 IN

## 2019-10-15 LAB
HDLC SERPL-MCNC: 37 MG/DL
POC CHOLESTEROL, LDL (DOCK): 43 MG/DL
POC CHOLESTEROL, TOTAL: 113 MG/DL
POC GLUCOSE, FASTING: 83 MG/DL (ref 60–110)
TRIGL SERPL-MCNC: 167 MG/DL

## 2019-10-22 ENCOUNTER — PATIENT MESSAGE (OUTPATIENT)
Dept: OTHER | Facility: OTHER | Age: 49
End: 2019-10-22

## 2019-10-22 ENCOUNTER — CLINICAL SUPPORT (OUTPATIENT)
Dept: OTHER | Facility: OTHER | Age: 49
End: 2019-10-22

## 2019-10-22 DIAGNOSIS — I10 ESSENTIAL HYPERTENSION: Primary | ICD-10-CM

## 2019-10-22 NOTE — PROGRESS NOTES
Spoke to patient for enrollment into the Hypertension Digital Medicine Program.  She is currently a patient of an [facility] affiliate with a diagnosis of HTN and on HTN medication.          Assessment & Plan      Essential Hypertension I10  Patient to be enrolled in the Hypertension Digital Medicine Program.   - An [employer] digital blood pressure cuff will be provided for at-home use.  Patient will be trained to use the device and start recording the blood pressure readings.   - With each blood pressure measurement, the patient's data automatically integrates with [employer], [facility]'s electronic medical record system.    - The patient's Care Team monitors the results and intervenes with medication or lifestyle modifications as needed.   - The patient and his/her physician (if applicable) receive customized monthly reports to track progress.

## 2019-10-28 ENCOUNTER — PATIENT MESSAGE (OUTPATIENT)
Dept: OTHER | Facility: OTHER | Age: 49
End: 2019-10-28

## 2019-10-31 ENCOUNTER — OFFICE VISIT (OUTPATIENT)
Dept: DERMATOLOGY | Facility: CLINIC | Age: 49
End: 2019-10-31
Payer: COMMERCIAL

## 2019-10-31 ENCOUNTER — APPOINTMENT (OUTPATIENT)
Dept: LAB | Facility: HOSPITAL | Age: 49
End: 2019-10-31
Attending: DERMATOLOGY
Payer: COMMERCIAL

## 2019-10-31 DIAGNOSIS — D22.9 MULTIPLE BENIGN NEVI: ICD-10-CM

## 2019-10-31 DIAGNOSIS — L30.1 DYSHIDROTIC ECZEMA: Primary | ICD-10-CM

## 2019-10-31 DIAGNOSIS — L40.50 PSORIATIC ARTHRITIS: ICD-10-CM

## 2019-10-31 DIAGNOSIS — L01.00 IMPETIGO: ICD-10-CM

## 2019-10-31 DIAGNOSIS — B07.8 COMMON WART: ICD-10-CM

## 2019-10-31 DIAGNOSIS — L81.4 SOLAR LENTIGO: ICD-10-CM

## 2019-10-31 PROCEDURE — 87077 CULTURE AEROBIC IDENTIFY: CPT

## 2019-10-31 PROCEDURE — 87070 CULTURE OTHR SPECIMN AEROBIC: CPT

## 2019-10-31 PROCEDURE — 87529 HSV DNA AMP PROBE: CPT

## 2019-10-31 PROCEDURE — 17110 PR DESTRUCTION BENIGN LESIONS UP TO 14: ICD-10-PCS | Mod: S$GLB,,, | Performed by: DERMATOLOGY

## 2019-10-31 PROCEDURE — 99999 PR PBB SHADOW E&M-EST. PATIENT-LVL II: ICD-10-PCS | Mod: PBBFAC,,, | Performed by: DERMATOLOGY

## 2019-10-31 PROCEDURE — 99203 PR OFFICE/OUTPT VISIT, NEW, LEVL III, 30-44 MIN: ICD-10-PCS | Mod: 25,S$GLB,, | Performed by: DERMATOLOGY

## 2019-10-31 PROCEDURE — 99203 OFFICE O/P NEW LOW 30 MIN: CPT | Mod: 25,S$GLB,, | Performed by: DERMATOLOGY

## 2019-10-31 PROCEDURE — 99999 PR PBB SHADOW E&M-EST. PATIENT-LVL II: CPT | Mod: PBBFAC,,, | Performed by: DERMATOLOGY

## 2019-10-31 PROCEDURE — 17110 DESTRUCTION B9 LES UP TO 14: CPT | Mod: S$GLB,,, | Performed by: DERMATOLOGY

## 2019-10-31 PROCEDURE — 87186 SC STD MICRODIL/AGAR DIL: CPT

## 2019-10-31 RX ORDER — IBUPROFEN 800 MG/1
800 TABLET ORAL 3 TIMES DAILY PRN
Refills: 2 | COMMUNITY
Start: 2019-10-17 | End: 2020-12-23 | Stop reason: SDUPTHER

## 2019-10-31 RX ORDER — DOXYCYCLINE 100 MG/1
TABLET ORAL
Qty: 14 TABLET | Refills: 2 | Status: SHIPPED | OUTPATIENT
Start: 2019-10-31 | End: 2019-11-26

## 2019-10-31 RX ORDER — MUPIROCIN 20 MG/G
OINTMENT TOPICAL
Qty: 30 G | Refills: 1 | Status: SHIPPED | OUTPATIENT
Start: 2019-10-31 | End: 2020-08-17

## 2019-10-31 RX ORDER — CLOBETASOL PROPIONATE 0.5 MG/G
CREAM TOPICAL
Qty: 45 G | Refills: 1 | Status: SHIPPED | OUTPATIENT
Start: 2019-10-31 | End: 2020-08-17

## 2019-10-31 NOTE — PROGRESS NOTES
Subjective:       Patient ID:  Oanh Marmolejo is a 49 y.o. female who presents for   Chief Complaint   Patient presents with    Skin Check     TBSE    Rash     right hand middle finger x several months     New patient  Previous derm many years ago    Patient here today for skin check, TBSE  Also c/o a rash right hand, middle finger x several months, red, dry cracked and very itchy, using OTC cortisone cream.    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   Skin: Positive for activity-related sunscreen use. Negative for daily sunscreen use and wears hat.   Hematologic/Lymphatic: Does not bruise/bleed easily.        Objective:    Physical Exam   Constitutional: She appears well-developed and well-nourished. No distress.   Neurological: She is alert and oriented to person, place, and time. She is not disoriented.   Psychiatric: She has a normal mood and affect.   Skin:   Areas Examined (abnormalities noted in diagram):   Scalp / Hair Palpated and Inspected  Head / Face Inspection Performed  Neck Inspection Performed  Chest / Axilla Inspection Performed  Abdomen Inspection Performed  Genitals / Buttocks / Groin Inspection Performed  Back Inspection Performed  RUE Inspected  LUE Inspection Performed  RLE Inspected  LLE Inspection Performed  Nails and Digits Inspection Performed                       Diagram Legend     Erythematous scaling macule/papule c/w actinic keratosis       Vascular papule c/w angioma      Pigmented verrucoid papule/plaque c/w seborrheic keratosis      Yellow umbilicated papule c/w sebaceous hyperplasia      Irregularly shaped tan macule c/w lentigo     1-2 mm smooth white papules consistent with Milia      Movable subcutaneous cyst with punctum c/w  epidermal inclusion cyst      Subcutaneous movable cyst c/w pilar cyst      Firm pink to brown papule c/w dermatofibroma      Pedunculated fleshy papule(s) c/w skin tag(s)      Evenly pigmented macule c/w junctional nevus     Mildly variegated pigmented, slightly irregular-bordered macule c/w mildly atypical nevus      Flesh colored to evenly pigmented papule c/w intradermal nevus       Pink pearly papule/plaque c/w basal cell carcinoma      Erythematous hyperkeratotic cursted plaque c/w SCC      Surgical scar with no sign of skin cancer recurrence      Open and closed comedones      Inflammatory papules and pustules      Verrucoid papule consistent consistent with wart     Erythematous eczematous patches and plaques     Dystrophic onycholytic nail with subungual debris c/w onychomycosis     Umbilicated papule    Erythematous-base heme-crusted tan verrucoid plaque consistent with inflamed seborrheic keratosis     Erythematous Silvery Scaling Plaque c/w Psoriasis     See annotation      Assessment / Plan:        Dyshidrotic eczema  -     HSV by Rapid PCR, Non-Blood [facility]; Skin; Future; Expected date: 10/31/2019  -     Aerobic culture  -     clobetasol (TEMOVATE) 0.05 % cream; AAA R middle finger BID PRN flare  Dispense: 45 g; Refill: 1  -     mupirocin (BACTROBAN) 2 % ointment; Apply to R middle finger TID  Dispense: 30 g; Refill: 1    Classic dyshidrotic with concern for staph superinfection  R/o HSV    Common wart  Cryosurgery procedure note:    Verbal consent from the patient is obtained. Liquid nitrogen cryosurgery is applied to 1 verruca with prior paring, as detailed in the physical exam, to produce a freeze injury. 3 consecutive freeze thaw cycles are applied to each verruca. The patient is aware that blisters (possibly blood blisters) may form.    Impetigo  -     Aerobic culture  -     doxycycline monohydrate 100 mg Tab; Take 1 po BID with food  Dispense: 14 tablet; Refill: 2    Solar lentigo  This is a  benign hyperpigmented sun induced lesion. Daily sun protection will reduce the number of new lesions. Treatment of these benign lesions are considered cosmetic.    Multiple benign nevi    Discussed ABCDE's of nevi.  Monitor for new mole or moles that are becoming bigger, darker, irritated, or developing irregular borders. Brochure provided.    Psoriatic arthritis  On cosentyx for over one year, good control    Patient instructed in importance in daily sun protection of at least spf 30. Mineral sunscreen ingredients preferred (Zinc +/- Titanium).   Recommend Elta MD for daily use on face and neck.  Patient encouraged to wear hat for all outdoor exposure.   Also discussed sun avoidance and use of protective clothing.           Follow up in about 1 month (around 11/30/2019), or if symptoms worsen or fail to improve.

## 2019-11-01 ENCOUNTER — TELEPHONE (OUTPATIENT)
Dept: DERMATOLOGY | Facility: CLINIC | Age: 49
End: 2019-11-01

## 2019-11-01 LAB
HSV1 DNA SPEC QL NAA+PROBE: NEGATIVE
HSV2 DNA SPEC QL NAA+PROBE: NEGATIVE
SPECIMEN SOURCE: NORMAL

## 2019-11-01 NOTE — TELEPHONE ENCOUNTER
----- Message from Unique Nunez sent at 11/1/2019  4:06 PM CDT -----  Contact: patient  Type:  Patient Returning Call    Who Called:  patient  Who Left Message for Patient:    Does the patient know what this is regarding?:   Best Call Back Number:  [phone]  Additional Information:

## 2019-11-02 LAB — BACTERIA SPEC AEROBE CULT: ABNORMAL

## 2019-11-26 ENCOUNTER — OFFICE VISIT (OUTPATIENT)
Dept: DERMATOLOGY | Facility: CLINIC | Age: 49
End: 2019-11-26
Payer: COMMERCIAL

## 2019-11-26 VITALS — HEIGHT: 62 IN | WEIGHT: 222 LBS | BODY MASS INDEX: 40.85 KG/M2

## 2019-11-26 DIAGNOSIS — L30.1 DYSHIDROTIC ECZEMA: ICD-10-CM

## 2019-11-26 DIAGNOSIS — B07.8 COMMON WART: Primary | ICD-10-CM

## 2019-11-26 PROCEDURE — 99999 PR PBB SHADOW E&M-EST. PATIENT-LVL II: CPT | Mod: PBBFAC,,, | Performed by: DERMATOLOGY

## 2019-11-26 PROCEDURE — 17110 DESTRUCTION B9 LES UP TO 14: CPT | Mod: S$GLB,,, | Performed by: DERMATOLOGY

## 2019-11-26 PROCEDURE — 3008F BODY MASS INDEX DOCD: CPT | Mod: CPTII,S$GLB,, | Performed by: DERMATOLOGY

## 2019-11-26 PROCEDURE — 99999 PR PBB SHADOW E&M-EST. PATIENT-LVL II: ICD-10-PCS | Mod: PBBFAC,,, | Performed by: DERMATOLOGY

## 2019-11-26 PROCEDURE — 99212 PR OFFICE/OUTPT VISIT, EST, LEVL II, 10-19 MIN: ICD-10-PCS | Mod: 25,S$GLB,, | Performed by: DERMATOLOGY

## 2019-11-26 PROCEDURE — 99212 OFFICE O/P EST SF 10 MIN: CPT | Mod: 25,S$GLB,, | Performed by: DERMATOLOGY

## 2019-11-26 PROCEDURE — 3008F PR BODY MASS INDEX (BMI) DOCUMENTED: ICD-10-PCS | Mod: CPTII,S$GLB,, | Performed by: DERMATOLOGY

## 2019-11-26 PROCEDURE — 17110 PR DESTRUCTION BENIGN LESIONS UP TO 14: ICD-10-PCS | Mod: S$GLB,,, | Performed by: DERMATOLOGY

## 2019-11-26 NOTE — PROGRESS NOTES
Subjective:       Patient ID:  Oanh Marmolejo is a 49 y.o. female who presents for   Chief Complaint   Patient presents with    Eczema     B hands, some dryness, much improved, tx with Clobetasol and Bactroban     Pt last sen 10-31-19 for dyshidrotic hand eczema, rx'd clobetasol, bactroban, and doxy  Fu WART, improve post cryo    Here today for f/u.  Pt states much improved, some dryness.  Finished 7 day course of doxy 11-8-19.  Tx with clobetasol and bactroban ointment daily.  Washes hands frequently for work, unable to wear gloves.    Denies PHX NMSC  Denied FHX MM     H/o PsoA, on cosentyx > 1 year (past tx with enbrel,  Humira, lost efficacy)  Minimal and occasional cutaneous pso burden  Dr West manages ([place])    Works in office setting       Works past time at [employer]        Review of Systems   Constitutional: Negative for fever and chills.   Skin: Positive for activity-related sunscreen use. Negative for daily sunscreen use and wears hat.   Hematologic/Lymphatic: Does not bruise/bleed easily.        Objective:    Physical Exam   Constitutional: She appears well-developed and well-nourished. No distress.   Neurological: She is alert and oriented to person, place, and time. She is not disoriented.   Psychiatric: She has a normal mood and affect.   Skin:   Areas Examined (abnormalities noted in diagram):   Nails and Digits Inspection Performed             Diagram Legend     Erythematous scaling macule/papule c/w actinic keratosis       Vascular papule c/w angioma      Pigmented verrucoid papule/plaque c/w seborrheic keratosis      Yellow umbilicated papule c/w sebaceous hyperplasia      Irregularly shaped tan macule c/w lentigo     1-2 mm smooth white papules consistent with Milia      Movable subcutaneous cyst with punctum c/w epidermal inclusion cyst      Subcutaneous movable cyst c/w pilar cyst      Firm pink to brown papule c/w dermatofibroma      Pedunculated fleshy papule(s) c/w skin  tag(s)      Evenly pigmented macule c/w junctional nevus     Mildly variegated pigmented, slightly irregular-bordered macule c/w mildly atypical nevus      Flesh colored to evenly pigmented papule c/w intradermal nevus       Pink pearly papule/plaque c/w basal cell carcinoma      Erythematous hyperkeratotic cursted plaque c/w SCC      Surgical scar with no sign of skin cancer recurrence      Open and closed comedones      Inflammatory papules and pustules      Verrucoid papule consistent consistent with wart     Erythematous eczematous patches and plaques     Dystrophic onycholytic nail with subungual debris c/w onychomycosis     Umbilicated papule    Erythematous-base heme-crusted tan verrucoid plaque consistent with inflamed seborrheic keratosis     Erythematous Silvery Scaling Plaque c/w Psoriasis     See annotation      Assessment / Plan:        Common wart  Cryosurgery procedure note:    Verbal consent from the patient is obtained. Liquid nitrogen cryosurgery is applied to 1 verruca with prior paring, as detailed in the physical exam, to produce a freeze injury. 3 consecutive freeze thaw cycles are applied to each verruca. The patient is aware that blisters (possibly blood blisters) may form.    Dyshidrotic eczema  Improved with treatment; resolved superinfection  Contact with scented soaps problematic, bring own soap to work  Aggressive moisturizer  Clobetasol PRN             Follow up if symptoms worsen or fail to improve.

## 2020-02-12 ENCOUNTER — TELEPHONE (OUTPATIENT)
Dept: FAMILY MEDICINE | Facility: CLINIC | Age: 50
End: 2020-02-12

## 2020-02-12 DIAGNOSIS — Z79.899 LONG-TERM USE OF HIGH-RISK MEDICATION: ICD-10-CM

## 2020-02-12 DIAGNOSIS — I10 ESSENTIAL HYPERTENSION: ICD-10-CM

## 2020-02-12 DIAGNOSIS — E78.2 MIXED HYPERLIPIDEMIA: ICD-10-CM

## 2020-02-12 DIAGNOSIS — Z87.39 HISTORY OF GOUT: Primary | ICD-10-CM

## 2020-02-12 DIAGNOSIS — M10.9 GOUT, UNSPECIFIED CAUSE, UNSPECIFIED CHRONICITY, UNSPECIFIED SITE: ICD-10-CM

## 2020-02-12 RX ORDER — ROSUVASTATIN CALCIUM 20 MG/1
20 TABLET, COATED ORAL DAILY
Qty: 90 TABLET | Refills: 1 | Status: SHIPPED | OUTPATIENT
Start: 2020-02-12 | End: 2020-08-17 | Stop reason: SDUPTHER

## 2020-02-12 RX ORDER — FOLIC ACID 1 MG/1
1 TABLET ORAL DAILY
Qty: 90 TABLET | Refills: 1 | Status: SHIPPED | OUTPATIENT
Start: 2020-02-12 | End: 2020-08-17 | Stop reason: SDUPTHER

## 2020-02-12 RX ORDER — ALLOPURINOL 300 MG/1
300 TABLET ORAL DAILY
Qty: 90 TABLET | Refills: 1 | Status: SHIPPED | OUTPATIENT
Start: 2020-02-12 | End: 2020-08-17 | Stop reason: SDUPTHER

## 2020-02-12 RX ORDER — LISINOPRIL AND HYDROCHLOROTHIAZIDE 12.5; 2 MG/1; MG/1
1 TABLET ORAL DAILY
Qty: 90 TABLET | Refills: 1 | Status: SHIPPED | OUTPATIENT
Start: 2020-02-12 | End: 2020-08-17 | Stop reason: SDUPTHER

## 2020-02-13 NOTE — TELEPHONE ENCOUNTER
----- Message from Ruba Irene MA sent at 1/15/2020  4:58 PM CST -----  Contact: Patient Walk IN    Spoke with pt is not completely out yet.  Nurse visit for refills or is this acceptable?     ----- Message -----  From: Indy Ivy  Sent: 1/13/2020   3:51 PM CST  To: Oly Coronel Staff    @ 3:51 patient brought in a print out from [facility] on [facility] with medications she was told she can get refills as a new patient until her appointment.    Allopurinol 300 mg  Folic Acid 1 mg   Lisinopril/HCTZ 20-12.5 mg  Rosuvastatin 20 mg    #  [phone]

## 2020-02-13 NOTE — TELEPHONE ENCOUNTER
Patient notified refills available at pharmacy and to complete fasting labs about one week prior to visit.  Patient verbalized understanding, informed to call with any questions/concerns -DN

## 2020-02-13 NOTE — TELEPHONE ENCOUNTER
Prescriptions have been sent to [facility] and fasting labs are available for paint to have done approx 1 week prior to her appt on 3/4/20

## 2020-02-13 NOTE — TELEPHONE ENCOUNTER
----- Message from Montez Briceño sent at 2/12/2020  3:09 PM CST -----  Contact: Oanh Marmolejo  Needs refill on Allopurinol, lisinopril, crestor, and folic acid (FOLVITE) 1 MG tablet  Send to [facility] on [facility]  Pt# [phone]

## 2020-02-24 DIAGNOSIS — Z12.31 ENCOUNTER FOR SCREENING MAMMOGRAM FOR MALIGNANT NEOPLASM OF BREAST: Primary | ICD-10-CM

## 2020-02-25 LAB
ALBUMIN SERPL-MCNC: 4.6 G/DL (ref 3.6–5.1)
ALBUMIN/GLOB SERPL: 1.8 (CALC) (ref 1–2.5)
ALP SERPL-CCNC: 69 U/L (ref 31–125)
ALT SERPL-CCNC: 25 U/L (ref 6–29)
AST SERPL-CCNC: 19 U/L (ref 10–35)
BASOPHILS # BLD AUTO: 11 CELLS/UL (ref 0–200)
BASOPHILS NFR BLD AUTO: 0.2 %
BILIRUB SERPL-MCNC: 0.9 MG/DL (ref 0.2–1.2)
BUN SERPL-MCNC: 14 MG/DL (ref 7–25)
BUN/CREAT SERPL: ABNORMAL (CALC) (ref 6–22)
CALCIUM SERPL-MCNC: 9.7 MG/DL (ref 8.6–10.2)
CHLORIDE SERPL-SCNC: 101 MMOL/L (ref 98–110)
CHOLEST SERPL-MCNC: 125 MG/DL
CHOLEST/HDLC SERPL: 3 (CALC)
CO2 SERPL-SCNC: 28 MMOL/L (ref 20–32)
CREAT SERPL-MCNC: 0.77 MG/DL (ref 0.5–1.1)
EOSINOPHIL # BLD AUTO: 101 CELLS/UL (ref 15–500)
EOSINOPHIL NFR BLD AUTO: 1.9 %
ERYTHROCYTE [DISTWIDTH] IN BLOOD BY AUTOMATED COUNT: 13.7 % (ref 11–15)
GFRSERPLBLD MDRD-ARVRAT: 91 ML/MIN/1.73M2
GLOBULIN SER CALC-MCNC: 2.5 G/DL (CALC) (ref 1.9–3.7)
GLUCOSE SERPL-MCNC: 100 MG/DL (ref 65–99)
HCT VFR BLD AUTO: 35.4 % (ref 35–45)
HDLC SERPL-MCNC: 42 MG/DL
HGB BLD-MCNC: 11.9 G/DL (ref 11.7–15.5)
LDLC SERPL CALC-MCNC: 60 MG/DL (CALC)
LYMPHOCYTES # BLD AUTO: 1330 CELLS/UL (ref 850–3900)
LYMPHOCYTES NFR BLD AUTO: 25.1 %
MCH RBC QN AUTO: 27.6 PG (ref 27–33)
MCHC RBC AUTO-ENTMCNC: 33.6 G/DL (ref 32–36)
MCV RBC AUTO: 82.1 FL (ref 80–100)
MONOCYTES # BLD AUTO: 429 CELLS/UL (ref 200–950)
MONOCYTES NFR BLD AUTO: 8.1 %
NEUTROPHILS # BLD AUTO: 3429 CELLS/UL (ref 1500–7800)
NEUTROPHILS NFR BLD AUTO: 64.7 %
NONHDLC SERPL-MCNC: 83 MG/DL (CALC)
PLATELET # BLD AUTO: 231 THOUSAND/UL (ref 140–400)
PMV BLD REES-ECKER: 11.1 FL (ref 7.5–12.5)
POTASSIUM SERPL-SCNC: 4.2 MMOL/L (ref 3.5–5.3)
PROT SERPL-MCNC: 7.1 G/DL (ref 6.1–8.1)
RBC # BLD AUTO: 4.31 MILLION/UL (ref 3.8–5.1)
SODIUM SERPL-SCNC: 136 MMOL/L (ref 135–146)
TRIGL SERPL-MCNC: 155 MG/DL
URATE SERPL-MCNC: 5.1 MG/DL (ref 2.5–7)
WBC # BLD AUTO: 5.3 THOUSAND/UL (ref 3.8–10.8)

## 2020-03-04 ENCOUNTER — HOSPITAL ENCOUNTER (OUTPATIENT)
Dept: RADIOLOGY | Facility: HOSPITAL | Age: 50
Discharge: HOME | End: 2020-03-04
Attending: INTERNAL MEDICINE
Payer: COMMERCIAL

## 2020-03-04 ENCOUNTER — TELEPHONE (OUTPATIENT)
Dept: FAMILY MEDICINE | Facility: CLINIC | Age: 50
End: 2020-03-04

## 2020-03-04 ENCOUNTER — OFFICE VISIT (OUTPATIENT)
Dept: FAMILY MEDICINE | Facility: CLINIC | Age: 50
End: 2020-03-04
Payer: COMMERCIAL

## 2020-03-04 VITALS
SYSTOLIC BLOOD PRESSURE: 120 MMHG | DIASTOLIC BLOOD PRESSURE: 82 MMHG | HEART RATE: 92 BPM | WEIGHT: 244 LBS | HEIGHT: 62 IN | BODY MASS INDEX: 44.9 KG/M2

## 2020-03-04 DIAGNOSIS — Z12.31 ENCOUNTER FOR SCREENING MAMMOGRAM FOR MALIGNANT NEOPLASM OF BREAST: ICD-10-CM

## 2020-03-04 DIAGNOSIS — Z87.39 HISTORY OF GOUT: ICD-10-CM

## 2020-03-04 DIAGNOSIS — L40.50 PSORIATIC ARTHRITIS: ICD-10-CM

## 2020-03-04 DIAGNOSIS — I47.10 PSVT (PAROXYSMAL SUPRAVENTRICULAR TACHYCARDIA): ICD-10-CM

## 2020-03-04 DIAGNOSIS — F41.1 GAD (GENERALIZED ANXIETY DISORDER): ICD-10-CM

## 2020-03-04 DIAGNOSIS — M25.473 ANKLE SWELLING, UNSPECIFIED LATERALITY: ICD-10-CM

## 2020-03-04 DIAGNOSIS — E78.2 MIXED HYPERLIPIDEMIA: Primary | ICD-10-CM

## 2020-03-04 DIAGNOSIS — I10 ESSENTIAL HYPERTENSION: ICD-10-CM

## 2020-03-04 DIAGNOSIS — E66.09 OBESITY DUE TO EXCESS CALORIES, UNSPECIFIED CLASSIFICATION, UNSPECIFIED WHETHER SERIOUS COMORBIDITY PRESENT: ICD-10-CM

## 2020-03-04 PROCEDURE — 77067 SCR MAMMO BI INCL CAD: CPT | Mod: TC,PO

## 2020-03-04 PROCEDURE — 3074F PR MOST RECENT SYSTOLIC BLOOD PRESSURE < 130 MM HG: ICD-10-PCS | Mod: S$GLB,,, | Performed by: FAMILY MEDICINE

## 2020-03-04 PROCEDURE — 3008F BODY MASS INDEX DOCD: CPT | Mod: S$GLB,,, | Performed by: FAMILY MEDICINE

## 2020-03-04 PROCEDURE — 3008F PR BODY MASS INDEX (BMI) DOCUMENTED: ICD-10-PCS | Mod: S$GLB,,, | Performed by: FAMILY MEDICINE

## 2020-03-04 PROCEDURE — 99203 PR OFFICE/OUTPT VISIT, NEW, LEVL III, 30-44 MIN: ICD-10-PCS | Mod: S$GLB,,, | Performed by: FAMILY MEDICINE

## 2020-03-04 PROCEDURE — 3074F SYST BP LT 130 MM HG: CPT | Mod: S$GLB,,, | Performed by: FAMILY MEDICINE

## 2020-03-04 PROCEDURE — 3079F PR MOST RECENT DIASTOLIC BLOOD PRESSURE 80-89 MM HG: ICD-10-PCS | Mod: S$GLB,,, | Performed by: FAMILY MEDICINE

## 2020-03-04 PROCEDURE — 99203 OFFICE O/P NEW LOW 30 MIN: CPT | Mod: S$GLB,,, | Performed by: FAMILY MEDICINE

## 2020-03-04 PROCEDURE — 3079F DIAST BP 80-89 MM HG: CPT | Mod: S$GLB,,, | Performed by: FAMILY MEDICINE

## 2020-03-04 RX ORDER — FLUOXETINE HYDROCHLORIDE 20 MG/1
20 CAPSULE ORAL DAILY
Qty: 90 CAPSULE | Refills: 1 | Status: SHIPPED | OUTPATIENT
Start: 2020-03-04 | End: 2020-08-17

## 2020-03-04 NOTE — TELEPHONE ENCOUNTER
----- Message from Chika Rodas sent at 3/4/2020 12:40 PM CST -----  Contact: Oanh  Will the patient needs labs before her next apt. She goes to [facility] . pts # [phone]

## 2020-03-04 NOTE — PROGRESS NOTES
SUBJECTIVE:    Patient ID: Oanh Marmolejo is a 49 y.o. female.    Chief Complaint: Establish Care and discuss lisinopril/hctz    Patient with history of hypertension and hyperlipidemia presents as a new patient to establish care.  She has been on lisinopril HCTZ and does still have some concern about swelling in her ankles.  Swelling is the same throughout today with a she is taking medication or not.  Blood pressure well controlled.  Weight fluctuates secondary to her ability to stick to her meal plans.  Labs performed show acceptable metabolic panel acceptable lipid panel.   Patient complains of some increased anxiety and inability to concentrate.  Currently has trouble focusing at work.  Has history of cervical degenerative disc disease and now reports some occasional radicular/carpal tunnel type symptoms in left hand.  She does spend a lot of time working on the computer.       Telephone on 02/12/2020   Component Date Value Ref Range Status    Cholesterol 02/24/2020 125  <200 mg/dL Final    HDL 02/24/2020 42* > OR = 50 mg/dL Final    Triglycerides 02/24/2020 155* <150 mg/dL Final    LDL Cholesterol 02/24/2020 60  mg/dL (calc) Final    Hdl/Cholesterol Ratio 02/24/2020 3.0  <5.0 (calc) Final    Non HDL Chol. (LDL+VLDL) 02/24/2020 83  <130 mg/dL (calc) Final    Glucose 02/24/2020 100* 65 - 99 mg/dL Final    BUN, Bld 02/24/2020 14  7 - 25 mg/dL Final    Creatinine 02/24/2020 0.77  0.50 - 1.10 mg/dL Final    eGFR if non African American 02/24/2020 91  > OR = 60 mL/min/1.73m2 Final    eGFR if  02/24/2020 106  > OR = 60 mL/min/1.73m2 Final    BUN/Creatinine Ratio 02/24/2020 NOT APPLICABLE  6 - 22 (calc) Final    Sodium 02/24/2020 136  135 - 146 mmol/L Final    Potassium 02/24/2020 4.2  3.5 - 5.3 mmol/L Final    Chloride 02/24/2020 101  98 - 110 mmol/L Final    CO2 02/24/2020 28  20 - 32 mmol/L Final    Calcium 02/24/2020 9.7  8.6 - 10.2 mg/dL Final    Total Protein 02/24/2020 7.1   6.1 - 8.1 g/dL Final    Albumin 02/24/2020 4.6  3.6 - 5.1 g/dL Final    Globulin, Total 02/24/2020 2.5  1.9 - 3.7 g/dL (calc) Final    Albumin/Globulin Ratio 02/24/2020 1.8  1.0 - 2.5 (calc) Final    Total Bilirubin 02/24/2020 0.9  0.2 - 1.2 mg/dL Final    Alkaline Phosphatase 02/24/2020 69  31 - 125 U/L Final    AST 02/24/2020 19  10 - 35 U/L Final    ALT 02/24/2020 25  6 - 29 U/L Final    WBC 02/24/2020 5.3  3.8 - 10.8 Thousand/uL Final    RBC 02/24/2020 4.31  3.80 - 5.10 Million/uL Final    Hemoglobin 02/24/2020 11.9  11.7 - 15.5 g/dL Final    Hematocrit 02/24/2020 35.4  35.0 - 45.0 % Final    Mean Corpuscular Volume 02/24/2020 82.1  80.0 - 100.0 fL Final    Mean Corpuscular Hemoglobin 02/24/2020 27.6  27.0 - 33.0 pg Final    Mean Corpuscular Hemoglobin Conc 02/24/2020 33.6  32.0 - 36.0 g/dL Final    RDW 02/24/2020 13.7  11.0 - 15.0 % Final    Platelets 02/24/2020 231  140 - 400 Thousand/uL Final    MPV 02/24/2020 11.1  7.5 - 12.5 fL Final    Neutrophils Absolute 02/24/2020 3,429  1,500 - 7,800 cells/uL Final    Lymph # 02/24/2020 1,330  850 - 3,900 cells/uL Final    Mono # 02/24/2020 429  200 - 950 cells/uL Final    Eos # 02/24/2020 101  15 - 500 cells/uL Final    Baso # 02/24/2020 11  0 - 200 cells/uL Final    Neutrophils Relative 02/24/2020 64.7  % Final    Lymph% 02/24/2020 25.1  % Final    Mono% 02/24/2020 8.1  % Final    Eosinophil% 02/24/2020 1.9  % Final    Basophil% 02/24/2020 0.2  % Final    Uric Acid 02/24/2020 5.1  2.5 - 7.0 mg/dL Final   Office Visit on 10/31/2019   Component Date Value Ref Range Status    Aerobic Bacterial Culture 10/31/2019 *  Final                    Value:STAPHYLOCOCCUS AUREUS  Many      HSV PCR Source 10/31/2019 SKIN - RIGHT FINGER   Corrected    HSV1, PCR 10/31/2019 Negative  Negative Final    HSV2, PCR 10/31/2019 Negative  Negative Final   Clinical Support on 10/14/2019   Component Date Value Ref Range Status    POC Cholesterol, Total  10/14/2019 113  <240 MG/DL Final    POC Cholesterol, HDL 10/14/2019 37* MG/DL Final    POC Cholesterol, LDL 10/14/2019 43  <160 MG/DL Final    POC Triglycerides 10/14/2019 167* <160 MG/DL Final    POC Glucose, Fasting 10/14/2019 83  60 - 110 MG/DL Final       Past Medical History:   Diagnosis Date    Arthritis     Back pain     Degenerative disc disease     LIZBETH (generalized anxiety disorder)     Gout flare     High triglycerides     History of gout     Hyperlipidemia     Hypertension     Joint pain     Obese     Pain of left calf     Psoriasis     Swelling of lower extremity      Past Surgical History:   Procedure Laterality Date    NECK SURGERY       Family History   Problem Relation Age of Onset    Diabetes Father 70    Heart disease Father     Hyperlipidemia Father     Hypertension Father     ALS Father     Cancer Maternal Aunt     Cancer Paternal Uncle     Diabetes Maternal Grandmother     Diabetes Paternal Grandmother     Breast cancer Mother 74    Melanoma Neg Hx     Psoriasis Neg Hx     Lupus Neg Hx     Eczema Neg Hx        Marital Status:   Alcohol History:  reports that she drinks alcohol.  Tobacco History:  reports that she has never smoked. She has never used smokeless tobacco.  Drug History:  reports that she does not use drugs.    Review of patient's allergies indicates:   Allergen Reactions    Pistachio nut        Current Outpatient Medications:     allopurinoL (ZYLOPRIM) 300 MG tablet, Take 1 tablet (300 mg total) by mouth once daily., Disp: 90 tablet, Rfl: 1    clobetasol (TEMOVATE) 0.05 % cream, AAA R middle finger BID PRN flare, Disp: 45 g, Rfl: 1    COSENTYX PEN, 2 PENS, 150 mg/mL PnIj, INJECT 300MG (2 PENS) INTO THE SKIN ONCE A MONTH, Disp: , Rfl: 5    folic acid (FOLVITE) 1 MG tablet, Take 1 tablet (1 mg total) by mouth Daily., Disp: 90 tablet, Rfl: 1    ibuprofen (ADVIL,MOTRIN) 800 MG tablet, Take 800 mg by mouth 3 (three) times daily as needed.,  "Disp: , Rfl: 2    lisinopril-hydrochlorothiazide (PRINZIDE,ZESTORETIC) 20-12.5 mg per tablet, Take 1 tablet by mouth once daily., Disp: 90 tablet, Rfl: 1    rosuvastatin (CRESTOR) 20 MG tablet, Take 1 tablet (20 mg total) by mouth once daily., Disp: 90 tablet, Rfl: 1    FLUoxetine 20 MG capsule, Take 1 capsule (20 mg total) by mouth once daily. For mood, Disp: 90 capsule, Rfl: 1    mupirocin (BACTROBAN) 2 % ointment, Apply to R middle finger TID, Disp: 30 g, Rfl: 1    Review of Systems   Constitutional: Negative for activity change, fatigue and unexpected weight change.   HENT: Negative for hearing loss, postnasal drip, sinus pressure, sore throat and voice change.    Eyes: Negative for photophobia and visual disturbance.   Respiratory: Negative for cough, shortness of breath and wheezing.    Cardiovascular: Negative for chest pain and palpitations.   Gastrointestinal: Negative for constipation, diarrhea and nausea.   Genitourinary: Negative for difficulty urinating, frequency, hematuria and urgency.   Musculoskeletal: Positive for arthralgias. Negative for back pain.   Skin: Negative for rash.   Neurological: Negative for weakness, light-headedness and headaches.   Hematological: Negative for adenopathy. Does not bruise/bleed easily.   Psychiatric/Behavioral: The patient is not nervous/anxious.           Objective:      Vitals:    03/04/20 1145   BP: 120/82   Pulse: 92   Weight: 110.7 kg (244 lb)   Height: 5' 2" (1.575 m)     Physical Exam   Constitutional: She is oriented to person, place, and time. Vital signs are normal. She appears well-developed and well-nourished. No distress.   HENT:   Head: Normocephalic and atraumatic.   Right Ear: Tympanic membrane and external ear normal.   Left Ear: Tympanic membrane and external ear normal.   Eyes: Pupils are equal, round, and reactive to light. Conjunctivae, EOM and lids are normal.   Neck: Full passive range of motion without pain. Neck supple. No JVD present. No " tracheal deviation present. No thyromegaly present.   Cardiovascular: Normal rate and regular rhythm. PMI is not displaced.   Pulmonary/Chest: Effort normal and breath sounds normal.   Abdominal: Soft. Bowel sounds are normal. There is no hepatosplenomegaly. There is no tenderness. There is no rebound and no guarding.   Musculoskeletal: Normal range of motion. She exhibits edema. She exhibits no tenderness.   Neurological: She is alert and oriented to person, place, and time.   Skin: Skin is warm and dry. No rash noted.   Psychiatric: She has a normal mood and affect.   Vitals reviewed.        Assessment:       1. Mixed hyperlipidemia    2. Essential hypertension    3. LIZBETH (generalized anxiety disorder)    4. Obesity due to excess calories, unspecified classification, unspecified whether serious comorbidity present    5. BMI 40.0-44.9, adult    6. Psoriatic arthritis    7. PSVT (paroxysmal supraventricular tachycardia)    8. Ankle swelling, unspecified laterality    9. History of gout         Plan:       Mixed hyperlipidemia  Comments:  No problems with current therapy.      Essential hypertension  Comments:  Stable on meds    LIZBETH (generalized anxiety disorder)  -     FLUoxetine 20 MG capsule; Take 1 capsule (20 mg total) by mouth once daily. For mood  Dispense: 90 capsule; Refill: 1    Obesity due to excess calories, unspecified classification, unspecified whether serious comorbidity present  Comments:  resume weight watchers     BMI 40.0-44.9, adult    Psoriatic arthritis  Comments:  foloow with new speacilist    PSVT (paroxysmal supraventricular tachycardia)  Comments:  Stable on meds    Ankle swelling, unspecified laterality  Comments:  watch salt and wear compression stockings    History of gout      Follow up in about 5 months (around 8/4/2020) for weight aand mood and BP.

## 2020-07-01 ENCOUNTER — TELEPHONE (OUTPATIENT)
Dept: FAMILY MEDICINE | Facility: CLINIC | Age: 50
End: 2020-07-01

## 2020-07-01 NOTE — TELEPHONE ENCOUNTER
----- Message from Chika Rodas sent at 7/1/2020  1:57 PM CDT -----  The patient is asking if she can take Keto Fast 700 mg with the medication she is on. Please call her pt's # [phone] GH

## 2020-08-10 ENCOUNTER — TELEPHONE (OUTPATIENT)
Dept: FAMILY MEDICINE | Facility: CLINIC | Age: 50
End: 2020-08-10

## 2020-08-10 NOTE — TELEPHONE ENCOUNTER
----- Message from Chika Rodas sent at 8/10/2020  2:02 PM CDT -----  The patient has an apt Monday. Does she need labs before her apt? She goes to [facility] in [place]. Can you fax the order if she needs labs. Please call and let her know  pt's # [phone] GH

## 2020-08-17 ENCOUNTER — OFFICE VISIT (OUTPATIENT)
Dept: FAMILY MEDICINE | Facility: CLINIC | Age: 50
End: 2020-08-17
Payer: COMMERCIAL

## 2020-08-17 VITALS
TEMPERATURE: 98 F | WEIGHT: 243 LBS | SYSTOLIC BLOOD PRESSURE: 98 MMHG | BODY MASS INDEX: 44.72 KG/M2 | HEART RATE: 76 BPM | DIASTOLIC BLOOD PRESSURE: 72 MMHG | HEIGHT: 62 IN

## 2020-08-17 DIAGNOSIS — I10 ESSENTIAL HYPERTENSION: ICD-10-CM

## 2020-08-17 DIAGNOSIS — F41.1 GAD (GENERALIZED ANXIETY DISORDER): ICD-10-CM

## 2020-08-17 DIAGNOSIS — L40.50 PSORIATIC ARTHRITIS: Primary | ICD-10-CM

## 2020-08-17 DIAGNOSIS — M10.9 GOUT, UNSPECIFIED CAUSE, UNSPECIFIED CHRONICITY, UNSPECIFIED SITE: ICD-10-CM

## 2020-08-17 DIAGNOSIS — E78.2 MIXED HYPERLIPIDEMIA: ICD-10-CM

## 2020-08-17 PROCEDURE — 3078F DIAST BP <80 MM HG: CPT | Mod: S$GLB,,, | Performed by: FAMILY MEDICINE

## 2020-08-17 PROCEDURE — 99214 PR OFFICE/OUTPT VISIT, EST, LEVL IV, 30-39 MIN: ICD-10-PCS | Mod: S$GLB,,, | Performed by: FAMILY MEDICINE

## 2020-08-17 PROCEDURE — 3074F SYST BP LT 130 MM HG: CPT | Mod: S$GLB,,, | Performed by: FAMILY MEDICINE

## 2020-08-17 PROCEDURE — 3078F PR MOST RECENT DIASTOLIC BLOOD PRESSURE < 80 MM HG: ICD-10-PCS | Mod: S$GLB,,, | Performed by: FAMILY MEDICINE

## 2020-08-17 PROCEDURE — 3008F BODY MASS INDEX DOCD: CPT | Mod: S$GLB,,, | Performed by: FAMILY MEDICINE

## 2020-08-17 PROCEDURE — 3008F PR BODY MASS INDEX (BMI) DOCUMENTED: ICD-10-PCS | Mod: S$GLB,,, | Performed by: FAMILY MEDICINE

## 2020-08-17 PROCEDURE — 99214 OFFICE O/P EST MOD 30 MIN: CPT | Mod: S$GLB,,, | Performed by: FAMILY MEDICINE

## 2020-08-17 PROCEDURE — 3074F PR MOST RECENT SYSTOLIC BLOOD PRESSURE < 130 MM HG: ICD-10-PCS | Mod: S$GLB,,, | Performed by: FAMILY MEDICINE

## 2020-08-17 RX ORDER — FLUOXETINE HYDROCHLORIDE 40 MG/1
40 CAPSULE ORAL DAILY
Qty: 90 CAPSULE | Refills: 1 | Status: SHIPPED | OUTPATIENT
Start: 2020-08-17 | End: 2021-02-15 | Stop reason: SDUPTHER

## 2020-08-17 RX ORDER — FOLIC ACID 1 MG/1
1 TABLET ORAL DAILY
Qty: 90 TABLET | Refills: 1 | Status: SHIPPED | OUTPATIENT
Start: 2020-08-17 | End: 2021-02-15 | Stop reason: SDUPTHER

## 2020-08-17 RX ORDER — ROSUVASTATIN CALCIUM 20 MG/1
20 TABLET, COATED ORAL DAILY
Qty: 90 TABLET | Refills: 1 | Status: SHIPPED | OUTPATIENT
Start: 2020-08-17 | End: 2021-02-15 | Stop reason: SDUPTHER

## 2020-08-17 RX ORDER — ALLOPURINOL 300 MG/1
300 TABLET ORAL DAILY
Qty: 90 TABLET | Refills: 1 | Status: SHIPPED | OUTPATIENT
Start: 2020-08-17 | End: 2021-02-15 | Stop reason: SDUPTHER

## 2020-08-17 RX ORDER — LISINOPRIL AND HYDROCHLOROTHIAZIDE 12.5; 2 MG/1; MG/1
1 TABLET ORAL DAILY
Qty: 90 TABLET | Refills: 1 | Status: SHIPPED | OUTPATIENT
Start: 2020-08-17 | End: 2021-02-15 | Stop reason: SDUPTHER

## 2020-08-17 NOTE — PROGRESS NOTES
SUBJECTIVE:    Patient ID: Oanh Marmolejo is a 50 y.o. female.    Chief Complaint: Regular Check Up    Patient with HTN has been working on weight loss. Was using ketofast but had increase bowel movements.  Reports she did not follow a ketogenic diet..  BP well controlled. Weight unchanged. Was following weight watchers was succesful but stopped. No flares of gout continue on allopurinol.   prozac has helped with anxiety is on 20 mg and states is  better  Has stopped Cosentyx and has not taking past 4 months due to COVID concerns. Psoriasis flares noted to her scalp      No visits with results within 6 Month(s) from this visit.   Latest known visit with results is:   Telephone on 02/12/2020   Component Date Value Ref Range Status    Cholesterol 02/24/2020 125  <200 mg/dL Final    HDL 02/24/2020 42* > OR = 50 mg/dL Final    Triglycerides 02/24/2020 155* <150 mg/dL Final    LDL Cholesterol 02/24/2020 60  mg/dL (calc) Final    Hdl/Cholesterol Ratio 02/24/2020 3.0  <5.0 (calc) Final    Non HDL Chol. (LDL+VLDL) 02/24/2020 83  <130 mg/dL (calc) Final    Glucose 02/24/2020 100* 65 - 99 mg/dL Final    BUN, Bld 02/24/2020 14  7 - 25 mg/dL Final    Creatinine 02/24/2020 0.77  0.50 - 1.10 mg/dL Final    eGFR if non African American 02/24/2020 91  > OR = 60 mL/min/1.73m2 Final    eGFR if  02/24/2020 106  > OR = 60 mL/min/1.73m2 Final    BUN/Creatinine Ratio 02/24/2020 NOT APPLICABLE  6 - 22 (calc) Final    Sodium 02/24/2020 136  135 - 146 mmol/L Final    Potassium 02/24/2020 4.2  3.5 - 5.3 mmol/L Final    Chloride 02/24/2020 101  98 - 110 mmol/L Final    CO2 02/24/2020 28  20 - 32 mmol/L Final    Calcium 02/24/2020 9.7  8.6 - 10.2 mg/dL Final    Total Protein 02/24/2020 7.1  6.1 - 8.1 g/dL Final    Albumin 02/24/2020 4.6  3.6 - 5.1 g/dL Final    Globulin, Total 02/24/2020 2.5  1.9 - 3.7 g/dL (calc) Final    Albumin/Globulin Ratio 02/24/2020 1.8  1.0 - 2.5 (calc) Final    Total Bilirubin  02/24/2020 0.9  0.2 - 1.2 mg/dL Final    Alkaline Phosphatase 02/24/2020 69  31 - 125 U/L Final    AST 02/24/2020 19  10 - 35 U/L Final    ALT 02/24/2020 25  6 - 29 U/L Final    WBC 02/24/2020 5.3  3.8 - 10.8 Thousand/uL Final    RBC 02/24/2020 4.31  3.80 - 5.10 Million/uL Final    Hemoglobin 02/24/2020 11.9  11.7 - 15.5 g/dL Final    Hematocrit 02/24/2020 35.4  35.0 - 45.0 % Final    Mean Corpuscular Volume 02/24/2020 82.1  80.0 - 100.0 fL Final    Mean Corpuscular Hemoglobin 02/24/2020 27.6  27.0 - 33.0 pg Final    Mean Corpuscular Hemoglobin Conc 02/24/2020 33.6  32.0 - 36.0 g/dL Final    RDW 02/24/2020 13.7  11.0 - 15.0 % Final    Platelets 02/24/2020 231  140 - 400 Thousand/uL Final    MPV 02/24/2020 11.1  7.5 - 12.5 fL Final    Neutrophils Absolute 02/24/2020 3,429  1,500 - 7,800 cells/uL Final    Lymph # 02/24/2020 1,330  850 - 3,900 cells/uL Final    Mono # 02/24/2020 429  200 - 950 cells/uL Final    Eos # 02/24/2020 101  15 - 500 cells/uL Final    Baso # 02/24/2020 11  0 - 200 cells/uL Final    Neutrophils Relative 02/24/2020 64.7  % Final    Lymph% 02/24/2020 25.1  % Final    Mono% 02/24/2020 8.1  % Final    Eosinophil% 02/24/2020 1.9  % Final    Basophil% 02/24/2020 0.2  % Final    Uric Acid 02/24/2020 5.1  2.5 - 7.0 mg/dL Final       Past Medical History:   Diagnosis Date    Arthritis     Back pain     Degenerative disc disease     LIZBETH (generalized anxiety disorder)     Gout flare     High triglycerides     History of gout     Hyperlipidemia     Hypertension     Joint pain     Obese     Pain of left calf     Psoriasis     Swelling of lower extremity      Past Surgical History:   Procedure Laterality Date    NECK SURGERY       Family History   Problem Relation Age of Onset    Diabetes Father 70    Heart disease Father     Hyperlipidemia Father     Hypertension Father     ALS Father     Cancer Maternal Aunt     Cancer Paternal Uncle     Diabetes Maternal  Grandmother     Diabetes Paternal Grandmother     Breast cancer Mother 74    Melanoma Neg Hx     Psoriasis Neg Hx     Lupus Neg Hx     Eczema Neg Hx        Marital Status:   Alcohol History:  reports current alcohol use.  Tobacco History:  reports that she has never smoked. She has never used smokeless tobacco.  Drug History:  reports no history of drug use.    Review of patient's allergies indicates:   Allergen Reactions    Pistachio nut        Current Outpatient Medications:     allopurinoL (ZYLOPRIM) 300 MG tablet, Take 1 tablet (300 mg total) by mouth once daily., Disp: 90 tablet, Rfl: 1    FLUoxetine 40 MG capsule, Take 1 capsule (40 mg total) by mouth once daily. For mood, Disp: 90 capsule, Rfl: 1    folic acid (FOLVITE) 1 MG tablet, Take 1 tablet (1 mg total) by mouth Daily., Disp: 90 tablet, Rfl: 1    ibuprofen (ADVIL,MOTRIN) 800 MG tablet, Take 800 mg by mouth 3 (three) times daily as needed., Disp: , Rfl: 2    lisinopriL-hydrochlorothiazide (PRINZIDE,ZESTORETIC) 20-12.5 mg per tablet, Take 1 tablet by mouth once daily., Disp: 90 tablet, Rfl: 1    rosuvastatin (CRESTOR) 20 MG tablet, Take 1 tablet (20 mg total) by mouth once daily., Disp: 90 tablet, Rfl: 1    COSENTYX PEN, 2 PENS, 150 mg/mL PnIj, INJECT 300MG (2 PENS) INTO THE SKIN ONCE A MONTH, Disp: , Rfl: 5    Review of Systems   Constitutional: Negative for activity change, fatigue and unexpected weight change.   HENT: Negative for hearing loss, postnasal drip, sinus pressure, sore throat and voice change.    Eyes: Negative for photophobia and visual disturbance.   Respiratory: Negative for cough, shortness of breath and wheezing.    Cardiovascular: Negative for chest pain and palpitations.   Gastrointestinal: Negative for constipation, diarrhea and nausea.   Genitourinary: Negative for difficulty urinating, frequency, hematuria and urgency.   Musculoskeletal: Negative for arthralgias and back pain.   Skin: Positive for rash.  "  Neurological: Negative for weakness, light-headedness and headaches.   Hematological: Negative for adenopathy. Does not bruise/bleed easily.   Psychiatric/Behavioral: The patient is not nervous/anxious.           Objective:      Vitals:    08/17/20 1605   BP: 98/72   Pulse: 76   Temp: 97.8 °F (36.6 °C)   Weight: 110.2 kg (243 lb)   Height: 5' 2" (1.575 m)     Physical Exam  Vitals signs reviewed.   Constitutional:       General: She is not in acute distress.     Appearance: Normal appearance. She is well-developed.   HENT:      Head: Normocephalic and atraumatic.      Right Ear: External ear normal.      Left Ear: External ear normal.      Nose: Nose normal.      Mouth/Throat:      Mouth: Mucous membranes are moist.   Eyes:      General: Lids are normal.      Conjunctiva/sclera: Conjunctivae normal.      Pupils: Pupils are equal, round, and reactive to light.   Neck:      Musculoskeletal: Full passive range of motion without pain and neck supple.      Thyroid: No thyromegaly.      Vascular: No JVD.      Trachea: No tracheal deviation.   Cardiovascular:      Rate and Rhythm: Normal rate and regular rhythm.      Chest Wall: PMI is not displaced.      Pulses: Normal pulses.      Heart sounds: Normal heart sounds.   Pulmonary:      Effort: Pulmonary effort is normal.      Breath sounds: Normal breath sounds.   Abdominal:      General: Bowel sounds are normal.      Palpations: Abdomen is soft.      Tenderness: There is no abdominal tenderness. There is no guarding or rebound.   Musculoskeletal: Normal range of motion.         General: No tenderness.   Skin:     General: Skin is warm and dry.      Findings: No rash.   Neurological:      General: No focal deficit present.      Mental Status: She is alert and oriented to person, place, and time.   Psychiatric:         Mood and Affect: Mood normal.         Behavior: Behavior normal.           Assessment:       1. Psoriatic arthritis    2. LIZBETH (generalized anxiety disorder) "    3. Mixed hyperlipidemia    4. Essential hypertension    5. Gout, unspecified cause, unspecified chronicity, unspecified site         Plan:       Psoriatic arthritis  -     Ambulatory referral/consult to Rheumatology; Future; Expected date: 08/24/2020  -     CBC auto differential; Future; Expected date: 02/01/2021    LIZBETH (generalized anxiety disorder)  -     FLUoxetine 40 MG capsule; Take 1 capsule (40 mg total) by mouth once daily. For mood  Dispense: 90 capsule; Refill: 1    Mixed hyperlipidemia  -     rosuvastatin (CRESTOR) 20 MG tablet; Take 1 tablet (20 mg total) by mouth once daily.  Dispense: 90 tablet; Refill: 1  -     Lipid Panel; Future; Expected date: 02/01/2021    Essential hypertension  -     lisinopriL-hydrochlorothiazide (PRINZIDE,ZESTORETIC) 20-12.5 mg per tablet; Take 1 tablet by mouth once daily.  Dispense: 90 tablet; Refill: 1  -     folic acid (FOLVITE) 1 MG tablet; Take 1 tablet (1 mg total) by mouth Daily.  Dispense: 90 tablet; Refill: 1  -     Comprehensive metabolic panel; Future; Expected date: 02/01/2021    Gout, unspecified cause, unspecified chronicity, unspecified site  -     allopurinoL (ZYLOPRIM) 300 MG tablet; Take 1 tablet (300 mg total) by mouth once daily.  Dispense: 90 tablet; Refill: 1      Follow up in about 6 months (around 2/17/2021) for Annual Physical.

## 2020-09-23 ENCOUNTER — CLINICAL SUPPORT (OUTPATIENT)
Dept: OTHER | Facility: CLINIC | Age: 50
End: 2020-09-23
Payer: COMMERCIAL

## 2020-09-23 DIAGNOSIS — Z00.8 ENCOUNTER FOR OTHER GENERAL EXAMINATION: ICD-10-CM

## 2020-09-23 PROCEDURE — 80061 LIPID PANEL: CPT | Mod: QW,S$GLB,, | Performed by: INTERNAL MEDICINE

## 2020-09-23 PROCEDURE — 99401 PREV MED CNSL INDIV APPRX 15: CPT | Mod: S$GLB,,, | Performed by: INTERNAL MEDICINE

## 2020-09-23 PROCEDURE — 80061 PR  LIPID PANEL: ICD-10-PCS | Mod: QW,S$GLB,, | Performed by: INTERNAL MEDICINE

## 2020-09-23 PROCEDURE — 99401 PR PREVENT COUNSEL,INDIV,15 MIN: ICD-10-PCS | Mod: S$GLB,,, | Performed by: INTERNAL MEDICINE

## 2020-09-23 PROCEDURE — 82947 ASSAY GLUCOSE BLOOD QUANT: CPT | Mod: QW,S$GLB,, | Performed by: INTERNAL MEDICINE

## 2020-09-23 PROCEDURE — 82947 PR  ASSAY QUANTITATIVE,BLOOD GLUCOSE: ICD-10-PCS | Mod: QW,S$GLB,, | Performed by: INTERNAL MEDICINE

## 2020-10-02 VITALS — BODY MASS INDEX: 44.45 KG/M2 | HEIGHT: 62 IN

## 2020-10-02 LAB
HDLC SERPL-MCNC: 29 MG/DL
POC CHOLESTEROL, LDL (DOCK): 58 MG/DL
POC CHOLESTEROL, TOTAL: 128 MG/DL
POC GLUCOSE, FASTING: 91 MG/DL (ref 60–110)
TRIGL SERPL-MCNC: 204 MG/DL

## 2020-10-08 ENCOUNTER — TELEPHONE (OUTPATIENT)
Dept: FAMILY MEDICINE | Facility: CLINIC | Age: 50
End: 2020-10-08

## 2020-10-08 DIAGNOSIS — L40.50 PSORIATIC ARTHRITIS: Primary | ICD-10-CM

## 2020-10-08 NOTE — TELEPHONE ENCOUNTER
----- Message from Chika Rodas sent at 10/8/2020  1:34 PM CDT -----  Referral to Dr. Sandra Payne  a rheumatologist.Please fax the referral  fax # [phone]  [facility]  #  [phone] GH

## 2020-10-12 ENCOUNTER — TELEPHONE (OUTPATIENT)
Dept: RHEUMATOLOGY | Facility: CLINIC | Age: 50
End: 2020-10-12

## 2020-10-12 NOTE — TELEPHONE ENCOUNTER
----- Message from Jose L Back sent at 10/12/2020  3:50 PM CDT -----  Contact: Patient  The pt called to schedule an appt but there is no availability until 2/24/21    The has a referral in her chart from Dr. Coronel    The pt can be reached at [phone]      Patient lives in [place] and referral in [facility]. Patient will call [place] to see Dr. Silva. Hanane

## 2020-10-13 ENCOUNTER — TELEPHONE (OUTPATIENT)
Dept: FAMILY MEDICINE | Facility: CLINIC | Age: 50
End: 2020-10-13

## 2020-10-13 NOTE — TELEPHONE ENCOUNTER
----- Message from Chika Rodas sent at 10/13/2020  4:23 PM CDT -----  The patient needs an apt soon . She can not get into either  rheumatologist until January. Her Middle finger is warm,painful, and swollen. She is not sure if its gout or psoriatic arthritis . If  you can not see her can you call in anti inflammatory for her. [facility] on [address]. Pt's  # [phone] GH

## 2020-10-14 RX ORDER — METHYLPREDNISOLONE 4 MG/1
TABLET ORAL
Qty: 1 PACKAGE | Refills: 0 | Status: SHIPPED | OUTPATIENT
Start: 2020-10-14 | End: 2020-10-27 | Stop reason: ALTCHOICE

## 2020-10-27 ENCOUNTER — OFFICE VISIT (OUTPATIENT)
Dept: FAMILY MEDICINE | Facility: CLINIC | Age: 50
End: 2020-10-27
Payer: COMMERCIAL

## 2020-10-27 ENCOUNTER — HOSPITAL ENCOUNTER (OUTPATIENT)
Dept: RADIOLOGY | Facility: HOSPITAL | Age: 50
Discharge: HOME | End: 2020-10-27
Attending: FAMILY MEDICINE
Payer: COMMERCIAL

## 2020-10-27 ENCOUNTER — TELEPHONE (OUTPATIENT)
Dept: RHEUMATOLOGY | Facility: CLINIC | Age: 50
End: 2020-10-27

## 2020-10-27 DIAGNOSIS — L40.50 PSORIATIC ARTHRITIS: Primary | ICD-10-CM

## 2020-10-27 DIAGNOSIS — L40.50 PSORIATIC ARTHRITIS: ICD-10-CM

## 2020-10-27 PROCEDURE — 3078F PR MOST RECENT DIASTOLIC BLOOD PRESSURE < 80 MM HG: ICD-10-PCS | Mod: S$GLB,,, | Performed by: FAMILY MEDICINE

## 2020-10-27 PROCEDURE — 3074F SYST BP LT 130 MM HG: CPT | Mod: S$GLB,,, | Performed by: FAMILY MEDICINE

## 2020-10-27 PROCEDURE — 99213 OFFICE O/P EST LOW 20 MIN: CPT | Mod: S$GLB,,, | Performed by: FAMILY MEDICINE

## 2020-10-27 PROCEDURE — 73120 X-RAY EXAM OF HAND: CPT | Mod: TC,PO,LT

## 2020-10-27 PROCEDURE — 99213 PR OFFICE/OUTPT VISIT, EST, LEVL III, 20-29 MIN: ICD-10-PCS | Mod: S$GLB,,, | Performed by: FAMILY MEDICINE

## 2020-10-27 PROCEDURE — 3078F DIAST BP <80 MM HG: CPT | Mod: S$GLB,,, | Performed by: FAMILY MEDICINE

## 2020-10-27 PROCEDURE — 3074F PR MOST RECENT SYSTOLIC BLOOD PRESSURE < 130 MM HG: ICD-10-PCS | Mod: S$GLB,,, | Performed by: FAMILY MEDICINE

## 2020-10-27 RX ORDER — PREDNISONE 20 MG/1
TABLET ORAL
Qty: 20 TABLET | Refills: 0 | Status: SHIPPED | OUTPATIENT
Start: 2020-10-27 | End: 2021-02-25

## 2020-10-27 NOTE — PROGRESS NOTES
SUBJECTIVE:    Patient ID: Oanh Marmolejo is a 50 y.o. female.    Chief Complaint: Finger Swelling xWeeks and finished medrol dosepak    Patient with history psoriatic arthritis and gout presents with complaints of finger swelling for the last several weeks.  She was given a Medrol dose pack that provided mild relief but only at the high dose.  Reports no redness.  Needs to make an appointment with rheumatology to resume her Cosentyx however appointments are not available for the next 3-4 months.  Does report some flare up of her psoriasis      Past Medical History:   Diagnosis Date    Arthritis     Back pain     Degenerative disc disease     LIZBETH (generalized anxiety disorder)     Gout flare     High triglycerides     History of gout     Hyperlipidemia     Hypertension     Joint pain     Obese     Pain of left calf     Psoriasis     Swelling of lower extremity      Past Surgical History:   Procedure Laterality Date    NECK SURGERY       Family History   Problem Relation Age of Onset    Diabetes Father 70    Heart disease Father     Hyperlipidemia Father     Hypertension Father     ALS Father     Cancer Maternal Aunt     Cancer Paternal Uncle     Diabetes Maternal Grandmother     Diabetes Paternal Grandmother     Breast cancer Mother 74    Melanoma Neg Hx     Psoriasis Neg Hx     Lupus Neg Hx     Eczema Neg Hx        Marital Status:   Alcohol History:  reports current alcohol use.  Tobacco History:  reports that she has never smoked. She has never used smokeless tobacco.  Drug History:  reports no history of drug use.    Review of patient's allergies indicates:   Allergen Reactions    Pistachio nut        Current Outpatient Medications:     allopurinoL (ZYLOPRIM) 300 MG tablet, Take 1 tablet (300 mg total) by mouth once daily., Disp: 90 tablet, Rfl: 1    COSENTYX PEN, 2 PENS, 150 mg/mL PnIj, INJECT 300MG (2 PENS) INTO THE SKIN ONCE A MONTH, Disp: , Rfl: 5    FLUoxetine 40  "MG capsule, Take 1 capsule (40 mg total) by mouth once daily. For mood, Disp: 90 capsule, Rfl: 1    folic acid (FOLVITE) 1 MG tablet, Take 1 tablet (1 mg total) by mouth Daily., Disp: 90 tablet, Rfl: 1    ibuprofen (ADVIL,MOTRIN) 800 MG tablet, Take 800 mg by mouth 3 (three) times daily as needed., Disp: , Rfl: 2    lisinopriL-hydrochlorothiazide (PRINZIDE,ZESTORETIC) 20-12.5 mg per tablet, Take 1 tablet by mouth once daily., Disp: 90 tablet, Rfl: 1    rosuvastatin (CRESTOR) 20 MG tablet, Take 1 tablet (20 mg total) by mouth once daily., Disp: 90 tablet, Rfl: 1    predniSONE (DELTASONE) 20 MG tablet, Three tablets p.o. daily for 3 days.  Two tablets p.o. daily for 3 days.  One tablet p.o. daily for 3 days.  Half tablet p.o. daily for 4 days, Disp: 20 tablet, Rfl: 0    Review of Systems   All other systems reviewed and are negative.         Objective:      Vitals:    10/27/20 1449   BP: (!) (P) 142/90   Pulse: (P) 76   Temp: (P) 98.6 °F (37 °C)   Height: (P) 5' 2" (1.575 m)     Physical Exam  Pulmonary:      Breath sounds: Normal breath sounds.   Musculoskeletal:        Hands:       Comments: Swelling, warmth and tenderness.    Neurological:      General: No focal deficit present.      Mental Status: She is alert.           Assessment:       1. Psoriatic arthritis         Plan:       Psoriatic arthritis  -     predniSONE (DELTASONE) 20 MG tablet; Three tablets p.o. daily for 3 days.  Two tablets p.o. daily for 3 days.  One tablet p.o. daily for 3 days.  Half tablet p.o. daily for 4 days  Dispense: 20 tablet; Refill: 0  -     X-Ray Hand 2 View Left; Future; Expected date: 10/27/2020      Follow up if symptoms worsen or fail to improve.              "

## 2020-10-27 NOTE — TELEPHONE ENCOUNTER
----- Message from Namita Liu sent at 10/27/2020 12:13 PM CDT -----  Regarding: appt access  Contact: pt  Pt is requesting a call regarding scheduling NP appt  Pt was referred to by Doctor Coronel     Pt can be reached at  [phone]    Patient booked in May. Aware of wait list. CG

## 2020-11-20 ENCOUNTER — TELEPHONE (OUTPATIENT)
Dept: RHEUMATOLOGY | Facility: CLINIC | Age: 50
End: 2020-11-20

## 2020-11-20 ENCOUNTER — TELEPHONE (OUTPATIENT)
Dept: FAMILY MEDICINE | Facility: CLINIC | Age: 50
End: 2020-11-20

## 2020-11-20 NOTE — TELEPHONE ENCOUNTER
Spoke with the office, pt is scheduled for May 6th but is currently on the wait list. There are currently no open spots, They will call the pt if anything comes up. Pt informed.

## 2020-11-20 NOTE — TELEPHONE ENCOUNTER
----- Message from Ruba Irene MA sent at 11/20/2020 11:17 AM CST -----  Please call office below and see if pt can be seen sooner than may -DN  ----- Message -----  From: Allyn Vázquez LPN  Sent: 11/16/2020   8:30 AM CST  To: Ruba Irene MA      ----- Message -----  From: Chika Rodas  Sent: 11/16/2020   8:13 AM CST  To: [facility] Staff    The patient has seen Dr. Coronel several times for her swollen finger. She can not get in to see Dr. Ruth Payne until May the Rheumatologist  DrsAngelica # [phone]  She is in [place] The top part and the palm is getting  more swollen. Its very painful. Can you call and see if you can get her in sooner. Pt's # [phone] GH

## 2020-11-20 NOTE — TELEPHONE ENCOUNTER
----- Message from Joann Hernandez sent at 11/20/2020 11:27 AM CST -----  Regarding: Needing to come on earlier  Contact: Oanh  Type:  Sooner Appointment Request    Caller is requesting a sooner appointment.  Caller declined first available appointment listed below.  Caller will not accept being placed on the waitlist and is requesting a message be sent to doctor.  Name of Caller: Pt  When is the first available appointment? 5/6  Symptoms:  Would the patient rather a call back or a response via [facility]? callback  Best Call Back Number:[phone]  Additional Information: Looking to come in before appt that is scheduled on 5/6

## 2020-11-20 NOTE — TELEPHONE ENCOUNTER
Spoke with pt who stated she needs to be seen sooner than May, got patient scheduled in [place] with , patient states she would like to keep appointment with  as well

## 2020-12-18 NOTE — PROGRESS NOTES
RHEUMATOLOGY CLINIC INITIAL VISIT    Reason for referral:-  Referred for evaluation of PsA    Chief complaints:- management of PsA       HPI:-  Oanh Bliss a 50 y.o. pleasant female with PMH of PsA (previously on Cosentyx) and gout comes in for an initial visit with above chief complaints.     Rheumatological history  Patient was diagnosed with PsA in .  Was previously on Cosentyx was discontinued it since quarantine due to COVID concerns.  Failed Enbrel, Humira, MTX.   Previously following Dr. Zack West (rheumatologist, [place])    Cosentyx was discontinued around  due to COVID concern.       Fhx:  No psoriasis, thyroid disease, or IBD.      Tobacco (denies), EtOH (social), recreational/illicit drugs (social)     Occupation: Admin     Denies Hx of clots/miscarriages -       ROS: Denies any mouth ulcers, Raynaud's,  photosensitivity, unintentional weight loss, vision changes, SOB, CP, joint swelling, arthralgia, myalgia, urinary/bowel symptoms, N/V, fever/chills, Sicca symptoms, recent travels/sick contacts, depression, insomnia, hair loss    Diffused psoriasis - all over.  Currently on OTC cortisone cream for the itch. Bilateral ankle and L 3rd digit joint swell.  Diffused arthralgia.     Review of Systems   Constitutional: Negative for chills, diaphoresis, fever, malaise/fatigue and weight loss.   HENT: Negative for congestion, ear discharge, ear pain, hearing loss, nosebleeds, sinus pain and tinnitus.    Eyes: Negative for photophobia, pain, discharge and redness.   Respiratory: Negative for cough, hemoptysis, sputum production, shortness of breath, wheezing and stridor.    Cardiovascular: Negative for chest pain, palpitations, orthopnea, claudication, leg swelling and PND.   Gastrointestinal: Negative for abdominal pain, constipation, diarrhea, heartburn, nausea and vomiting.   Genitourinary: Negative for dysuria, frequency, hematuria and urgency.   Musculoskeletal: Positive for  "joint pain. Negative for back pain, myalgias and neck pain.   Skin: Positive for itching and rash.   Neurological: Positive for weakness. Negative for dizziness, tingling, tremors and headaches.   Endo/Heme/Allergies: Does not bruise/bleed easily.   Psychiatric/Behavioral: Negative for depression, hallucinations and suicidal ideas. The patient is not nervous/anxious and does not have insomnia.        Past Medical History:   Diagnosis Date    Arthritis     Back pain     Degenerative disc disease     LIZBETH (generalized anxiety disorder)     Gout flare     High triglycerides     History of gout     Hyperlipidemia     Hypertension     Joint pain     Obese     Pain of left calf     Psoriasis     Swelling of lower extremity        Past Surgical History:   Procedure Laterality Date    NECK SURGERY          Social History     Tobacco Use    Smoking status: Never Smoker    Smokeless tobacco: Never Used   Substance Use Topics    Alcohol use: Yes    Drug use: No       Family History   Problem Relation Age of Onset    Diabetes Father 70    Heart disease Father     Hyperlipidemia Father     Hypertension Father     ALS Father     Cancer Maternal Aunt     Cancer Paternal Uncle     Diabetes Maternal Grandmother     Diabetes Paternal Grandmother     Breast cancer Mother 74    Melanoma Neg Hx     Psoriasis Neg Hx     Lupus Neg Hx     Eczema Neg Hx        Review of patient's allergies indicates:   Allergen Reactions    Pistachio nut        Vitals:    12/23/20 0856   Weight: 106.5 kg (234 lb 12.6 oz)   Height: 5' 2" (1.575 m)   PainSc:   5       Physical Exam   Constitutional: She is oriented to person, place, and time and well-developed, well-nourished, and in no distress.   Obese    HENT:   Head: Normocephalic and atraumatic.   Eyes: Pupils are equal, round, and reactive to light. Conjunctivae are normal.   Neck: Normal range of motion. Neck supple.   Cardiovascular: Normal rate, regular rhythm and " normal heart sounds.   Pulmonary/Chest: Effort normal and breath sounds normal.   Abdominal: Soft. Bowel sounds are normal.   Musculoskeletal: Normal range of motion.      Comments: Bilateral ankle synovitis  L 3rd digit dactylitis      Neurological: She is alert and oriented to person, place, and time. Gait normal.   Skin: Skin is warm and dry.   Diffused large psoriatic plaques in bilateral UE/LE, scalp, chest, back, ears     Psychiatric: Mood, memory, affect and judgment normal.       Labs:-  Results for LB MUNOZ (MRN [id number]) as of 12/18/2020 13:56   Ref. Range 10/31/2019 08:52 2/24/2020 07:53 3/4/2020 10:15 9/23/2020 07:44 10/27/2020 15:36   WBC Latest Ref Range: 3.8 - 10.8 Thousand/uL  5.3      RBC Latest Ref Range: 3.80 - 5.10 Million/uL  4.31      Hemoglobin Latest Ref Range: 11.7 - 15.5 g/dL  11.9      Hematocrit Latest Ref Range: 35.0 - 45.0 %  35.4      MCV Latest Ref Range: 80.0 - 100.0 fL  82.1      MCH Latest Ref Range: 27.0 - 33.0 pg  27.6      MCHC Latest Ref Range: 32.0 - 36.0 g/dL  33.6      RDW Latest Ref Range: 11.0 - 15.0 %  13.7      Platelets Latest Ref Range: 140 - 400 Thousand/uL  231      MPV Latest Ref Range: 7.5 - 12.5 fL  11.1      Neutrophils Relative Latest Units: %  64.7      Lymph % Latest Units: %  25.1      Mono % Latest Units: %  8.1      Eosinophil % Latest Units: %  1.9      Basophil % Latest Units: %  0.2      Neutrophils Absolute Latest Ref Range: 1,500 - 7,800 cells/uL  3,429      Lymph # Latest Ref Range: 850 - 3,900 cells/uL  1,330      Mono # Latest Ref Range: 200 - 950 cells/uL  429      Eos # Latest Ref Range: 15 - 500 cells/uL  101      Baso # Latest Ref Range: 0 - 200 cells/uL  11      Sodium Latest Ref Range: 135 - 146 mmol/L  136      Potassium Latest Ref Range: 3.5 - 5.3 mmol/L  4.2      Chloride Latest Ref Range: 98 - 110 mmol/L  101      CO2 Latest Ref Range: 20 - 32 mmol/L  28      BUN Latest Ref Range: 7 - 25 mg/dL  14      Creatinine Latest Ref Range:  0.50 - 1.10 mg/dL  0.77      BUN/CREAT RATIO Latest Ref Range: 6 - 22 (calc)  NOT APPLICABLE      eGFR if non African American Latest Ref Range: > OR = 60 mL/min/1.73m2  91      eGFR if African American Latest Ref Range: > OR = 60 mL/min/1.73m2  106      Glucose Latest Ref Range: 65 - 99 mg/dL  100 (H)      Calcium Latest Ref Range: 8.6 - 10.2 mg/dL  9.7      Alkaline Phosphatase Latest Ref Range: 31 - 125 U/L  69      PROTEIN TOTAL Latest Ref Range: 6.1 - 8.1 g/dL  7.1      Albumin Latest Ref Range: 3.6 - 5.1 g/dL  4.6      Albumin/Globulin Ratio Latest Ref Range: 1.0 - 2.5 (calc)  1.8      Uric Acid Latest Ref Range: 2.5 - 7.0 mg/dL  5.1      BILIRUBIN TOTAL Latest Ref Range: 0.2 - 1.2 mg/dL  0.9      AST Latest Ref Range: 10 - 35 U/L  19      ALT Latest Ref Range: 6 - 29 U/L  25      Triglycerides Latest Ref Range: <150 mg/dL  155 (H)      Globulin, Total Latest Ref Range: 1.9 - 3.7 g/dL (calc)  2.5      Cholesterol Latest Ref Range: <200 mg/dL  125      HDL Latest Ref Range: > OR = 50 mg/dL  42 (L)      HDL/Cholesterol Ratio Latest Ref Range: <5.0 (calc)  3.0      LDL Cholesterol External Latest Units: mg/dL (calc)  60      Non HDL Chol. (LDL+VLDL) Latest Ref Range: <130 mg/dL (calc)  83      HSV PCR Source Unknown SKIN - RIGHT FINGER       HSV1, PCR Latest Ref Range: Negative  Negative       HSV2, PCR Latest Ref Range: Negative  Negative       POC Glucose, Fasting Latest Ref Range: 60 - 110 MG/DL    91    POC Cholesterol, Total Latest Ref Range: <240 MG/DL    128    POC Cholesterol, HDL Latest Units: MG/DL    29 (L)    POC Cholesterol, LDL Latest Ref Range: <160 MG/DL    58    POC Triglycerides Latest Ref Range: <160 MG/DL    204 (H)    XR HAND 2 VIEW LEFT Unknown     Rpt   MAMMO DIGITAL SCREENING BILAT WITH TOMOSYNTHESIS_CAD Unknown   Attch       Radiographs:-  Independent visualization of images done.  Oct 2020 - L hand - mild osteoarthrosis of the 1st MTP   Aug 2014 - shoulder - normal L shoulder  May 2013 -  Lumbar spine - DJD     Old and Outside medical records :-  Reviewed old and all outside medical records available in Care Everywhere.    Medication List with Changes/Refills   Current Medications    ALLOPURINOL (ZYLOPRIM) 300 MG TABLET    Take 1 tablet (300 mg total) by mouth once daily.    FLUOXETINE 40 MG CAPSULE    Take 1 capsule (40 mg total) by mouth once daily. For mood    FOLIC ACID (FOLVITE) 1 MG TABLET    Take 1 tablet (1 mg total) by mouth Daily.    LISINOPRIL-HYDROCHLOROTHIAZIDE (PRINZIDE,ZESTORETIC) 20-12.5 MG PER TABLET    Take 1 tablet by mouth once daily.    PREDNISONE (DELTASONE) 20 MG TABLET    Three tablets p.o. daily for 3 days.  Two tablets p.o. daily for 3 days.  One tablet p.o. daily for 3 days.  Half tablet p.o. daily for 4 days    ROSUVASTATIN (CRESTOR) 20 MG TABLET    Take 1 tablet (20 mg total) by mouth once daily.   Changed and/or Refilled Medications    Modified Medication Previous Medication    COSENTYX PEN, 2 PENS, 150 MG/ML PNIJ COSENTYX PEN, 2 PENS, 150 mg/mL PnIj       Inject 300 mg into the skin every 30 days.    INJECT 300MG (2 PENS) INTO THE SKIN ONCE A MONTH    IBUPROFEN (ADVIL,MOTRIN) 800 MG TABLET ibuprofen (ADVIL,MOTRIN) 800 MG tablet       Take 1 tablet (800 mg total) by mouth 3 (three) times daily as needed.    Take 800 mg by mouth 3 (three) times daily as needed.       Assessment/Plans:-  50 y.o. pleasant female with PMH of PsA (previously on Cosentyx) and gout comes in for an initial visit for management of PsA.    Patient previously on Cosentyx but discontinued since March/April due to COVID scare is coming in today to establish care and experiencing PsA flare.     Had failed multiple medications in the past - Enbrel, Humira, and MTX.  Was last on Cosentyx 300mg 2 pens Qmonthly.    Plan  - CBC, CMP, ESR, CRP  - PreDMARDs  - recommendation for topical CeraVe cream (non scented) all over.  - Patient states that she had failed topical clobetasol in the past  - Ibuprofen  800mg TID PRN for arthralgia - to be taken with food   - Cosentyx 300mg 2 pens - sent to [facility]   - arthritis survey   - obtain records from previous rheumatologist  - recommendation for weight loss and dieting   - education provided on the importance of medication compliance    Time spent: 60 minutes in face to face discussion concerning diagnosis, prognosis, review of lab and test results, benefits of treatment as well as management of disease, counseling of patient and coordination of care between various health care providers.  Greater than half the time spent was used for coordination of care and counseling of patient.      Follow up in about 6 weeks (around 2/3/2021).    Thank you for allowing me to participate in the care ofOanh Marmolejo.    Disclaimer: This note was prepared using voice recognition system and is likely to have sound alike errors and is not proof read.  Please call me with any questions.

## 2020-12-23 ENCOUNTER — OFFICE VISIT (OUTPATIENT)
Dept: RHEUMATOLOGY | Facility: CLINIC | Age: 50
End: 2020-12-23
Payer: COMMERCIAL

## 2020-12-23 VITALS — WEIGHT: 234.81 LBS | HEIGHT: 62 IN | BODY MASS INDEX: 43.21 KG/M2

## 2020-12-23 DIAGNOSIS — E66.09 OBESITY DUE TO EXCESS CALORIES, UNSPECIFIED CLASSIFICATION, UNSPECIFIED WHETHER SERIOUS COMORBIDITY PRESENT: ICD-10-CM

## 2020-12-23 DIAGNOSIS — M25.50 ARTHRALGIA, UNSPECIFIED JOINT: Primary | ICD-10-CM

## 2020-12-23 DIAGNOSIS — L40.50 PSORIATIC ARTHRITIS: ICD-10-CM

## 2020-12-23 DIAGNOSIS — L40.9 PSORIASIS: ICD-10-CM

## 2020-12-23 DIAGNOSIS — I10 ESSENTIAL HYPERTENSION: ICD-10-CM

## 2020-12-23 PROCEDURE — 99999 PR PBB SHADOW E&M-EST. PATIENT-LVL III: ICD-10-PCS | Mod: PBBFAC,,, | Performed by: STUDENT IN AN ORGANIZED HEALTH CARE EDUCATION/TRAINING PROGRAM

## 2020-12-23 PROCEDURE — 99205 OFFICE O/P NEW HI 60 MIN: CPT | Mod: S$GLB,,, | Performed by: STUDENT IN AN ORGANIZED HEALTH CARE EDUCATION/TRAINING PROGRAM

## 2020-12-23 PROCEDURE — 3008F PR BODY MASS INDEX (BMI) DOCUMENTED: ICD-10-PCS | Mod: CPTII,S$GLB,, | Performed by: STUDENT IN AN ORGANIZED HEALTH CARE EDUCATION/TRAINING PROGRAM

## 2020-12-23 PROCEDURE — 99999 PR PBB SHADOW E&M-EST. PATIENT-LVL III: CPT | Mod: PBBFAC,,, | Performed by: STUDENT IN AN ORGANIZED HEALTH CARE EDUCATION/TRAINING PROGRAM

## 2020-12-23 PROCEDURE — 99205 PR OFFICE/OUTPT VISIT, NEW, LEVL V, 60-74 MIN: ICD-10-PCS | Mod: S$GLB,,, | Performed by: STUDENT IN AN ORGANIZED HEALTH CARE EDUCATION/TRAINING PROGRAM

## 2020-12-23 PROCEDURE — 1125F PR PAIN SEVERITY QUANTIFIED, PAIN PRESENT: ICD-10-PCS | Mod: S$GLB,,, | Performed by: STUDENT IN AN ORGANIZED HEALTH CARE EDUCATION/TRAINING PROGRAM

## 2020-12-23 PROCEDURE — 1125F AMNT PAIN NOTED PAIN PRSNT: CPT | Mod: S$GLB,,, | Performed by: STUDENT IN AN ORGANIZED HEALTH CARE EDUCATION/TRAINING PROGRAM

## 2020-12-23 PROCEDURE — 3008F BODY MASS INDEX DOCD: CPT | Mod: CPTII,S$GLB,, | Performed by: STUDENT IN AN ORGANIZED HEALTH CARE EDUCATION/TRAINING PROGRAM

## 2020-12-23 RX ORDER — IBUPROFEN 800 MG/1
800 TABLET ORAL 3 TIMES DAILY PRN
Qty: 90 TABLET | Refills: 0 | Status: SHIPPED | OUTPATIENT
Start: 2020-12-23 | End: 2021-01-22

## 2020-12-23 RX ORDER — SECUKINUMAB 150 MG/ML
300 INJECTION SUBCUTANEOUS
Qty: 2 ML | Refills: 5 | Status: SHIPPED | OUTPATIENT
Start: 2020-12-23 | End: 2021-02-03 | Stop reason: SDUPTHER

## 2020-12-29 ENCOUNTER — LAB VISIT (OUTPATIENT)
Dept: LAB | Facility: HOSPITAL | Age: 50
End: 2020-12-29
Attending: STUDENT IN AN ORGANIZED HEALTH CARE EDUCATION/TRAINING PROGRAM
Payer: COMMERCIAL

## 2020-12-29 DIAGNOSIS — I10 ESSENTIAL HYPERTENSION: ICD-10-CM

## 2020-12-29 DIAGNOSIS — M25.50 ARTHRALGIA, UNSPECIFIED JOINT: ICD-10-CM

## 2020-12-29 DIAGNOSIS — L40.9 PSORIASIS: ICD-10-CM

## 2020-12-29 DIAGNOSIS — L40.50 PSORIATIC ARTHRITIS: ICD-10-CM

## 2020-12-29 LAB
ALBUMIN SERPL BCP-MCNC: 4.1 G/DL (ref 3.5–5.2)
ALP SERPL-CCNC: 71 U/L (ref 55–135)
ALT SERPL W/O P-5'-P-CCNC: 16 U/L (ref 10–44)
ANION GAP SERPL CALC-SCNC: 9 MMOL/L (ref 8–16)
AST SERPL-CCNC: 18 U/L (ref 10–40)
BASOPHILS # BLD AUTO: 0.01 K/UL (ref 0–0.2)
BASOPHILS NFR BLD: 0.2 % (ref 0–1.9)
BILIRUB SERPL-MCNC: 1 MG/DL (ref 0.1–1)
BUN SERPL-MCNC: 12 MG/DL (ref 6–20)
CALCIUM SERPL-MCNC: 9.5 MG/DL (ref 8.7–10.5)
CHLORIDE SERPL-SCNC: 105 MMOL/L (ref 95–110)
CO2 SERPL-SCNC: 25 MMOL/L (ref 23–29)
CREAT SERPL-MCNC: 0.8 MG/DL (ref 0.5–1.4)
CRP SERPL-MCNC: 6.9 MG/L (ref 0–8.2)
DIFFERENTIAL METHOD: ABNORMAL
EOSINOPHIL # BLD AUTO: 0.1 K/UL (ref 0–0.5)
EOSINOPHIL NFR BLD: 2 % (ref 0–8)
ERYTHROCYTE [DISTWIDTH] IN BLOOD BY AUTOMATED COUNT: 14 % (ref 11.5–14.5)
ERYTHROCYTE [SEDIMENTATION RATE] IN BLOOD BY WESTERGREN METHOD: 32 MM/HR (ref 0–20)
EST. GFR  (AFRICAN AMERICAN): >60 ML/MIN/1.73 M^2
EST. GFR  (NON AFRICAN AMERICAN): >60 ML/MIN/1.73 M^2
GLUCOSE SERPL-MCNC: 89 MG/DL (ref 70–110)
HCT VFR BLD AUTO: 34.3 % (ref 37–48.5)
HGB BLD-MCNC: 10.8 G/DL (ref 12–16)
IMM GRANULOCYTES # BLD AUTO: 0.01 K/UL (ref 0–0.04)
IMM GRANULOCYTES NFR BLD AUTO: 0.2 % (ref 0–0.5)
LYMPHOCYTES # BLD AUTO: 1 K/UL (ref 1–4.8)
LYMPHOCYTES NFR BLD: 19.7 % (ref 18–48)
MCH RBC QN AUTO: 26.5 PG (ref 27–31)
MCHC RBC AUTO-ENTMCNC: 31.5 G/DL (ref 32–36)
MCV RBC AUTO: 84 FL (ref 82–98)
MONOCYTES # BLD AUTO: 0.4 K/UL (ref 0.3–1)
MONOCYTES NFR BLD: 7.5 % (ref 4–15)
NEUTROPHILS # BLD AUTO: 3.5 K/UL (ref 1.8–7.7)
NEUTROPHILS NFR BLD: 70.4 % (ref 38–73)
NRBC BLD-RTO: 0 /100 WBC
PLATELET # BLD AUTO: 242 K/UL (ref 150–350)
PMV BLD AUTO: 10.4 FL (ref 9.2–12.9)
POTASSIUM SERPL-SCNC: 3.6 MMOL/L (ref 3.5–5.1)
PROT SERPL-MCNC: 7.4 G/DL (ref 6–8.4)
RBC # BLD AUTO: 4.07 M/UL (ref 4–5.4)
RPR SER QL: NORMAL
SODIUM SERPL-SCNC: 139 MMOL/L (ref 136–145)
WBC # BLD AUTO: 4.92 K/UL (ref 3.9–12.7)

## 2020-12-29 PROCEDURE — 86787 VARICELLA-ZOSTER ANTIBODY: CPT

## 2020-12-29 PROCEDURE — 87340 HEPATITIS B SURFACE AG IA: CPT

## 2020-12-29 PROCEDURE — 86704 HEP B CORE ANTIBODY TOTAL: CPT

## 2020-12-29 PROCEDURE — 86790 VIRUS ANTIBODY NOS: CPT

## 2020-12-29 PROCEDURE — 86703 HIV-1/HIV-2 1 RESULT ANTBDY: CPT

## 2020-12-29 PROCEDURE — 86706 HEP B SURFACE ANTIBODY: CPT

## 2020-12-29 PROCEDURE — 86682 HELMINTH ANTIBODY: CPT

## 2020-12-29 PROCEDURE — 86140 C-REACTIVE PROTEIN: CPT

## 2020-12-29 PROCEDURE — 86803 HEPATITIS C AB TEST: CPT

## 2020-12-29 PROCEDURE — 85651 RBC SED RATE NONAUTOMATED: CPT

## 2020-12-29 PROCEDURE — 86592 SYPHILIS TEST NON-TREP QUAL: CPT

## 2020-12-29 PROCEDURE — 85025 COMPLETE CBC W/AUTO DIFF WBC: CPT

## 2020-12-29 PROCEDURE — 80053 COMPREHEN METABOLIC PANEL: CPT

## 2020-12-30 LAB
HBV CORE AB SERPL QL IA: NEGATIVE
HBV SURFACE AB SER-ACNC: NEGATIVE M[IU]/ML
HBV SURFACE AG SERPL QL IA: NEGATIVE
HCV AB SERPL QL IA: NEGATIVE
HEPATITIS A ANTIBODY, IGG: NEGATIVE
HIV 1+2 AB+HIV1 P24 AG SERPL QL IA: NEGATIVE
STRONGYLOIDES ANTIBODY IGG: NEGATIVE
VARICELLA INTERPRETATION: POSITIVE
VARICELLA ZOSTER IGG: 3.75 ISR (ref 0–0.9)

## 2021-01-13 ENCOUNTER — TELEPHONE (OUTPATIENT)
Dept: RHEUMATOLOGY | Facility: CLINIC | Age: 51
End: 2021-01-13

## 2021-01-19 ENCOUNTER — SPECIALTY PHARMACY (OUTPATIENT)
Dept: PHARMACY | Facility: CLINIC | Age: 51
End: 2021-01-19

## 2021-01-19 ENCOUNTER — PATIENT MESSAGE (OUTPATIENT)
Dept: RHEUMATOLOGY | Facility: CLINIC | Age: 51
End: 2021-01-19

## 2021-01-19 ENCOUNTER — TELEPHONE (OUTPATIENT)
Dept: RHEUMATOLOGY | Facility: CLINIC | Age: 51
End: 2021-01-19

## 2021-01-21 ENCOUNTER — SPECIALTY PHARMACY (OUTPATIENT)
Dept: PHARMACY | Facility: CLINIC | Age: 51
End: 2021-01-21

## 2021-01-21 DIAGNOSIS — L40.50 PSORIATIC ARTHRITIS: Primary | ICD-10-CM

## 2021-01-21 DIAGNOSIS — L40.9 PSORIASIS: ICD-10-CM

## 2021-01-28 ENCOUNTER — OCCUPATIONAL HEALTH (OUTPATIENT)
Dept: URGENT CARE | Facility: CLINIC | Age: 51
End: 2021-01-28

## 2021-01-28 PROCEDURE — 80305 PR NON-DOT DRUG SCREENS: ICD-10-PCS | Mod: S$GLB,,, | Performed by: EMERGENCY MEDICINE

## 2021-01-28 PROCEDURE — 80305 DRUG TEST PRSMV DIR OPT OBS: CPT | Mod: S$GLB,,, | Performed by: EMERGENCY MEDICINE

## 2021-02-03 ENCOUNTER — OFFICE VISIT (OUTPATIENT)
Dept: RHEUMATOLOGY | Facility: CLINIC | Age: 51
End: 2021-02-03
Payer: COMMERCIAL

## 2021-02-03 ENCOUNTER — TELEPHONE (OUTPATIENT)
Dept: FAMILY MEDICINE | Facility: CLINIC | Age: 51
End: 2021-02-03

## 2021-02-03 ENCOUNTER — PATIENT MESSAGE (OUTPATIENT)
Dept: RHEUMATOLOGY | Facility: CLINIC | Age: 51
End: 2021-02-03

## 2021-02-03 VITALS — WEIGHT: 236.31 LBS | HEIGHT: 62 IN | BODY MASS INDEX: 43.49 KG/M2

## 2021-02-03 DIAGNOSIS — L40.50 PSORIATIC ARTHRITIS: ICD-10-CM

## 2021-02-03 DIAGNOSIS — I10 ESSENTIAL HYPERTENSION: ICD-10-CM

## 2021-02-03 DIAGNOSIS — Z51.81 MEDICATION MONITORING ENCOUNTER: Primary | ICD-10-CM

## 2021-02-03 DIAGNOSIS — L40.9 PSORIASIS: ICD-10-CM

## 2021-02-03 DIAGNOSIS — M25.50 ARTHRALGIA, UNSPECIFIED JOINT: ICD-10-CM

## 2021-02-03 PROCEDURE — 99999 PR PBB SHADOW E&M-EST. PATIENT-LVL III: ICD-10-PCS | Mod: PBBFAC,,, | Performed by: STUDENT IN AN ORGANIZED HEALTH CARE EDUCATION/TRAINING PROGRAM

## 2021-02-03 PROCEDURE — 1126F PR PAIN SEVERITY QUANTIFIED, NO PAIN PRESENT: ICD-10-PCS | Mod: S$GLB,,, | Performed by: STUDENT IN AN ORGANIZED HEALTH CARE EDUCATION/TRAINING PROGRAM

## 2021-02-03 PROCEDURE — 3008F PR BODY MASS INDEX (BMI) DOCUMENTED: ICD-10-PCS | Mod: CPTII,S$GLB,, | Performed by: STUDENT IN AN ORGANIZED HEALTH CARE EDUCATION/TRAINING PROGRAM

## 2021-02-03 PROCEDURE — 3008F BODY MASS INDEX DOCD: CPT | Mod: CPTII,S$GLB,, | Performed by: STUDENT IN AN ORGANIZED HEALTH CARE EDUCATION/TRAINING PROGRAM

## 2021-02-03 PROCEDURE — 99214 PR OFFICE/OUTPT VISIT, EST, LEVL IV, 30-39 MIN: ICD-10-PCS | Mod: S$GLB,,, | Performed by: STUDENT IN AN ORGANIZED HEALTH CARE EDUCATION/TRAINING PROGRAM

## 2021-02-03 PROCEDURE — 99214 OFFICE O/P EST MOD 30 MIN: CPT | Mod: S$GLB,,, | Performed by: STUDENT IN AN ORGANIZED HEALTH CARE EDUCATION/TRAINING PROGRAM

## 2021-02-03 PROCEDURE — 99999 PR PBB SHADOW E&M-EST. PATIENT-LVL III: CPT | Mod: PBBFAC,,, | Performed by: STUDENT IN AN ORGANIZED HEALTH CARE EDUCATION/TRAINING PROGRAM

## 2021-02-03 PROCEDURE — 1126F AMNT PAIN NOTED NONE PRSNT: CPT | Mod: S$GLB,,, | Performed by: STUDENT IN AN ORGANIZED HEALTH CARE EDUCATION/TRAINING PROGRAM

## 2021-02-03 RX ORDER — SECUKINUMAB 150 MG/ML
300 INJECTION SUBCUTANEOUS
Qty: 2 ML | Refills: 5 | Status: SHIPPED | OUTPATIENT
Start: 2021-02-03 | End: 2021-02-03 | Stop reason: SDUPTHER

## 2021-02-03 RX ORDER — SECUKINUMAB 150 MG/ML
300 INJECTION SUBCUTANEOUS
Qty: 2 ML | Refills: 5 | Status: SHIPPED | OUTPATIENT
Start: 2021-02-03 | End: 2021-09-14 | Stop reason: SDUPTHER

## 2021-02-03 ASSESSMENT — ROUTINE ASSESSMENT OF PATIENT INDEX DATA (RAPID3)
PSYCHOLOGICAL DISTRESS SCORE: 1.1
PATIENT GLOBAL ASSESSMENT SCORE: 1
MDHAQ FUNCTION SCORE: 0
TOTAL RAPID3 SCORE: 1.5
PAIN SCORE: 3.5
FATIGUE SCORE: 3.5
AM STIFFNESS SCORE: 0, NO

## 2021-02-09 ENCOUNTER — LAB VISIT (OUTPATIENT)
Dept: LAB | Facility: HOSPITAL | Age: 51
End: 2021-02-09
Attending: FAMILY MEDICINE
Payer: COMMERCIAL

## 2021-02-09 DIAGNOSIS — E78.2 MIXED HYPERLIPIDEMIA: ICD-10-CM

## 2021-02-09 DIAGNOSIS — L40.50 PSORIATIC ARTHROPATHY: ICD-10-CM

## 2021-02-09 DIAGNOSIS — I10 ESSENTIAL HYPERTENSION, MALIGNANT: Primary | ICD-10-CM

## 2021-02-09 LAB
ALBUMIN SERPL BCP-MCNC: 4.4 G/DL (ref 3.5–5.2)
ALP SERPL-CCNC: 73 U/L (ref 55–135)
ALT SERPL W/O P-5'-P-CCNC: 22 U/L (ref 10–44)
ANION GAP SERPL CALC-SCNC: 11 MMOL/L (ref 8–16)
AST SERPL-CCNC: 23 U/L (ref 10–40)
BASOPHILS # BLD AUTO: 0.01 K/UL (ref 0–0.2)
BASOPHILS NFR BLD: 0.2 % (ref 0–1.9)
BILIRUB SERPL-MCNC: 1.2 MG/DL (ref 0.1–1)
BUN SERPL-MCNC: 13 MG/DL (ref 6–20)
CALCIUM SERPL-MCNC: 9.7 MG/DL (ref 8.7–10.5)
CHLORIDE SERPL-SCNC: 104 MMOL/L (ref 95–110)
CHOLEST SERPL-MCNC: 112 MG/DL (ref 120–199)
CHOLEST/HDLC SERPL: 4 {RATIO} (ref 2–5)
CO2 SERPL-SCNC: 24 MMOL/L (ref 23–29)
CREAT SERPL-MCNC: 0.8 MG/DL (ref 0.5–1.4)
DIFFERENTIAL METHOD: ABNORMAL
EOSINOPHIL # BLD AUTO: 0.1 K/UL (ref 0–0.5)
EOSINOPHIL NFR BLD: 1.7 % (ref 0–8)
ERYTHROCYTE [DISTWIDTH] IN BLOOD BY AUTOMATED COUNT: 14.6 % (ref 11.5–14.5)
EST. GFR  (AFRICAN AMERICAN): >60 ML/MIN/1.73 M^2
EST. GFR  (NON AFRICAN AMERICAN): >60 ML/MIN/1.73 M^2
GLUCOSE SERPL-MCNC: 94 MG/DL (ref 70–110)
HCT VFR BLD AUTO: 36.2 % (ref 37–48.5)
HDLC SERPL-MCNC: 28 MG/DL (ref 40–75)
HDLC SERPL: 25 % (ref 20–50)
HGB BLD-MCNC: 11.6 G/DL (ref 12–16)
IMM GRANULOCYTES # BLD AUTO: 0.01 K/UL (ref 0–0.04)
IMM GRANULOCYTES NFR BLD AUTO: 0.2 % (ref 0–0.5)
LDLC SERPL CALC-MCNC: 49.6 MG/DL (ref 63–159)
LYMPHOCYTES # BLD AUTO: 1 K/UL (ref 1–4.8)
LYMPHOCYTES NFR BLD: 18.8 % (ref 18–48)
MCH RBC QN AUTO: 26.8 PG (ref 27–31)
MCHC RBC AUTO-ENTMCNC: 32 G/DL (ref 32–36)
MCV RBC AUTO: 84 FL (ref 82–98)
MONOCYTES # BLD AUTO: 0.4 K/UL (ref 0.3–1)
MONOCYTES NFR BLD: 7.8 % (ref 4–15)
NEUTROPHILS # BLD AUTO: 3.7 K/UL (ref 1.8–7.7)
NEUTROPHILS NFR BLD: 71.3 % (ref 38–73)
NONHDLC SERPL-MCNC: 84 MG/DL
NRBC BLD-RTO: 0 /100 WBC
PLATELET # BLD AUTO: 258 K/UL (ref 150–350)
PMV BLD AUTO: 10.5 FL (ref 9.2–12.9)
POTASSIUM SERPL-SCNC: 3.9 MMOL/L (ref 3.5–5.1)
PROT SERPL-MCNC: 7.6 G/DL (ref 6–8.4)
RBC # BLD AUTO: 4.33 M/UL (ref 4–5.4)
SODIUM SERPL-SCNC: 139 MMOL/L (ref 136–145)
TRIGL SERPL-MCNC: 172 MG/DL (ref 30–150)
WBC # BLD AUTO: 5.15 K/UL (ref 3.9–12.7)

## 2021-02-09 PROCEDURE — 36415 COLL VENOUS BLD VENIPUNCTURE: CPT

## 2021-02-09 PROCEDURE — 85025 COMPLETE CBC W/AUTO DIFF WBC: CPT

## 2021-02-09 PROCEDURE — 80061 LIPID PANEL: CPT

## 2021-02-09 PROCEDURE — 80053 COMPREHEN METABOLIC PANEL: CPT

## 2021-02-12 ENCOUNTER — SPECIALTY PHARMACY (OUTPATIENT)
Dept: PHARMACY | Facility: CLINIC | Age: 51
End: 2021-02-12

## 2021-02-15 DIAGNOSIS — M10.9 GOUT, UNSPECIFIED CAUSE, UNSPECIFIED CHRONICITY, UNSPECIFIED SITE: ICD-10-CM

## 2021-02-15 DIAGNOSIS — F41.1 GAD (GENERALIZED ANXIETY DISORDER): ICD-10-CM

## 2021-02-15 DIAGNOSIS — I10 ESSENTIAL HYPERTENSION: ICD-10-CM

## 2021-02-15 DIAGNOSIS — E78.2 MIXED HYPERLIPIDEMIA: ICD-10-CM

## 2021-02-15 RX ORDER — FOLIC ACID 1 MG/1
1 TABLET ORAL DAILY
Qty: 30 TABLET | Refills: 0 | Status: SHIPPED | OUTPATIENT
Start: 2021-02-15 | End: 2021-03-24 | Stop reason: SDUPTHER

## 2021-02-15 RX ORDER — FLUOXETINE HYDROCHLORIDE 40 MG/1
40 CAPSULE ORAL DAILY
Qty: 30 CAPSULE | Refills: 0 | Status: SHIPPED | OUTPATIENT
Start: 2021-02-15 | End: 2021-02-25 | Stop reason: SDUPTHER

## 2021-02-15 RX ORDER — LISINOPRIL AND HYDROCHLOROTHIAZIDE 12.5; 2 MG/1; MG/1
1 TABLET ORAL DAILY
Qty: 30 TABLET | Refills: 0 | Status: SHIPPED | OUTPATIENT
Start: 2021-02-15 | End: 2021-02-25 | Stop reason: SDUPTHER

## 2021-02-15 RX ORDER — ROSUVASTATIN CALCIUM 20 MG/1
20 TABLET, COATED ORAL DAILY
Qty: 30 TABLET | Refills: 0 | Status: SHIPPED | OUTPATIENT
Start: 2021-02-15 | End: 2021-02-25

## 2021-02-15 RX ORDER — ALLOPURINOL 300 MG/1
300 TABLET ORAL DAILY
Qty: 30 TABLET | Refills: 0 | Status: SHIPPED | OUTPATIENT
Start: 2021-02-15 | End: 2021-02-25 | Stop reason: SDUPTHER

## 2021-02-25 ENCOUNTER — OFFICE VISIT (OUTPATIENT)
Dept: FAMILY MEDICINE | Facility: CLINIC | Age: 51
End: 2021-02-25
Payer: COMMERCIAL

## 2021-02-25 VITALS
SYSTOLIC BLOOD PRESSURE: 114 MMHG | HEART RATE: 83 BPM | WEIGHT: 234 LBS | HEIGHT: 62 IN | BODY MASS INDEX: 43.06 KG/M2 | DIASTOLIC BLOOD PRESSURE: 72 MMHG

## 2021-02-25 DIAGNOSIS — I10 ESSENTIAL HYPERTENSION: ICD-10-CM

## 2021-02-25 DIAGNOSIS — E66.09 OBESITY DUE TO EXCESS CALORIES, UNSPECIFIED CLASSIFICATION, UNSPECIFIED WHETHER SERIOUS COMORBIDITY PRESENT: ICD-10-CM

## 2021-02-25 DIAGNOSIS — Z12.31 ENCOUNTER FOR SCREENING MAMMOGRAM FOR MALIGNANT NEOPLASM OF BREAST: ICD-10-CM

## 2021-02-25 DIAGNOSIS — Z79.899 LONG-TERM USE OF HIGH-RISK MEDICATION: ICD-10-CM

## 2021-02-25 DIAGNOSIS — Z23 NEED FOR TETANUS, DIPHTHERIA, AND ACELLULAR PERTUSSIS (TDAP) VACCINE: ICD-10-CM

## 2021-02-25 DIAGNOSIS — L40.50 PSORIATIC ARTHRITIS: ICD-10-CM

## 2021-02-25 DIAGNOSIS — Z00.00 ANNUAL PHYSICAL EXAM: Primary | ICD-10-CM

## 2021-02-25 DIAGNOSIS — F41.1 GAD (GENERALIZED ANXIETY DISORDER): ICD-10-CM

## 2021-02-25 DIAGNOSIS — Z12.11 COLON CANCER SCREENING: ICD-10-CM

## 2021-02-25 DIAGNOSIS — M10.9 GOUT, UNSPECIFIED CAUSE, UNSPECIFIED CHRONICITY, UNSPECIFIED SITE: ICD-10-CM

## 2021-02-25 DIAGNOSIS — L40.9 PSORIASIS: ICD-10-CM

## 2021-02-25 DIAGNOSIS — E78.2 MIXED HYPERLIPIDEMIA: ICD-10-CM

## 2021-02-25 PROCEDURE — 99396 PR PREVENTIVE VISIT,EST,40-64: ICD-10-PCS | Mod: 25,S$GLB,, | Performed by: FAMILY MEDICINE

## 2021-02-25 PROCEDURE — 90471 TDAP VACCINE GREATER THAN OR EQUAL TO 7YO IM: ICD-10-PCS | Mod: S$GLB,,, | Performed by: FAMILY MEDICINE

## 2021-02-25 PROCEDURE — 90471 IMMUNIZATION ADMIN: CPT | Mod: S$GLB,,, | Performed by: FAMILY MEDICINE

## 2021-02-25 PROCEDURE — 99396 PREV VISIT EST AGE 40-64: CPT | Mod: 25,S$GLB,, | Performed by: FAMILY MEDICINE

## 2021-02-25 PROCEDURE — 3074F SYST BP LT 130 MM HG: CPT | Mod: S$GLB,,, | Performed by: FAMILY MEDICINE

## 2021-02-25 PROCEDURE — 90715 TDAP VACCINE 7 YRS/> IM: CPT | Mod: S$GLB,,, | Performed by: FAMILY MEDICINE

## 2021-02-25 PROCEDURE — 3074F PR MOST RECENT SYSTOLIC BLOOD PRESSURE < 130 MM HG: ICD-10-PCS | Mod: S$GLB,,, | Performed by: FAMILY MEDICINE

## 2021-02-25 PROCEDURE — 3078F PR MOST RECENT DIASTOLIC BLOOD PRESSURE < 80 MM HG: ICD-10-PCS | Mod: S$GLB,,, | Performed by: FAMILY MEDICINE

## 2021-02-25 PROCEDURE — 90715 TDAP VACCINE GREATER THAN OR EQUAL TO 7YO IM: ICD-10-PCS | Mod: S$GLB,,, | Performed by: FAMILY MEDICINE

## 2021-02-25 PROCEDURE — 3078F DIAST BP <80 MM HG: CPT | Mod: S$GLB,,, | Performed by: FAMILY MEDICINE

## 2021-02-25 PROCEDURE — 3008F BODY MASS INDEX DOCD: CPT | Mod: S$GLB,,, | Performed by: FAMILY MEDICINE

## 2021-02-25 PROCEDURE — 3008F PR BODY MASS INDEX (BMI) DOCUMENTED: ICD-10-PCS | Mod: S$GLB,,, | Performed by: FAMILY MEDICINE

## 2021-02-25 RX ORDER — ALLOPURINOL 300 MG/1
300 TABLET ORAL DAILY
Qty: 90 TABLET | Refills: 3 | Status: SHIPPED | OUTPATIENT
Start: 2021-02-25 | End: 2022-02-09 | Stop reason: SDUPTHER

## 2021-02-25 RX ORDER — LISINOPRIL AND HYDROCHLOROTHIAZIDE 12.5; 2 MG/1; MG/1
1 TABLET ORAL DAILY
Qty: 90 TABLET | Refills: 3 | Status: SHIPPED | OUTPATIENT
Start: 2021-02-25 | End: 2021-06-11

## 2021-02-25 RX ORDER — FOLIC ACID/MULTIVIT,IRON,MINER .4-18-35
1 TABLET,CHEWABLE ORAL DAILY
COMMUNITY
Start: 2021-02-25 | End: 2021-06-11

## 2021-02-25 RX ORDER — KRILL/OM-3/DHA/EPA/PHOSPHO/AST 1000-230MG
1 CAPSULE ORAL DAILY
COMMUNITY
Start: 2021-02-25 | End: 2022-02-09

## 2021-02-25 RX ORDER — ROSUVASTATIN CALCIUM 10 MG/1
10 TABLET, COATED ORAL DAILY
Qty: 90 TABLET | Refills: 1 | Status: SHIPPED | OUTPATIENT
Start: 2021-02-25 | End: 2021-10-12

## 2021-02-25 RX ORDER — FLUOXETINE HYDROCHLORIDE 40 MG/1
40 CAPSULE ORAL DAILY
Qty: 90 CAPSULE | Refills: 3 | Status: SHIPPED | OUTPATIENT
Start: 2021-02-25 | End: 2021-04-08 | Stop reason: CLARIF

## 2021-03-11 ENCOUNTER — HOSPITAL ENCOUNTER (OUTPATIENT)
Dept: RADIOLOGY | Facility: HOSPITAL | Age: 51
Discharge: HOME | End: 2021-03-11
Attending: FAMILY MEDICINE
Payer: COMMERCIAL

## 2021-03-11 DIAGNOSIS — Z12.31 ENCOUNTER FOR SCREENING MAMMOGRAM FOR MALIGNANT NEOPLASM OF BREAST: ICD-10-CM

## 2021-03-11 PROCEDURE — 77067 SCR MAMMO BI INCL CAD: CPT | Mod: TC,PO

## 2021-03-12 ENCOUNTER — SPECIALTY PHARMACY (OUTPATIENT)
Dept: PHARMACY | Facility: CLINIC | Age: 51
End: 2021-03-12

## 2021-03-23 DIAGNOSIS — I10 ESSENTIAL HYPERTENSION: ICD-10-CM

## 2021-03-24 RX ORDER — FOLIC ACID 1 MG/1
1 TABLET ORAL DAILY
Qty: 90 TABLET | Refills: 3 | Status: SHIPPED | OUTPATIENT
Start: 2021-03-24 | End: 2022-02-09 | Stop reason: SDUPTHER

## 2021-04-01 ENCOUNTER — OCCUPATIONAL HEALTH (OUTPATIENT)
Dept: URGENT CARE | Facility: CLINIC | Age: 51
End: 2021-04-01

## 2021-04-01 DIAGNOSIS — Z02.83 ENCOUNTER FOR DRUG SCREENING: Primary | ICD-10-CM

## 2021-04-01 LAB
BREATH ALCOHOL: 0
CTP QC/QA: YES
POC 10 PANEL DRUG SCREEN: NEGATIVE

## 2021-04-01 PROCEDURE — 82075 POCT BREATH ALCOHOL TEST: ICD-10-PCS | Mod: S$GLB,,, | Performed by: FAMILY MEDICINE

## 2021-04-01 PROCEDURE — 80305 POCT RAPID DRUG SCREEN 10 PANEL: ICD-10-PCS | Mod: QW,S$GLB,, | Performed by: FAMILY MEDICINE

## 2021-04-01 PROCEDURE — 82075 ASSAY OF BREATH ETHANOL: CPT | Mod: S$GLB,,, | Performed by: FAMILY MEDICINE

## 2021-04-01 PROCEDURE — 80305 DRUG TEST PRSMV DIR OPT OBS: CPT | Mod: QW,S$GLB,, | Performed by: FAMILY MEDICINE

## 2021-04-07 ENCOUNTER — PATIENT MESSAGE (OUTPATIENT)
Dept: RHEUMATOLOGY | Facility: CLINIC | Age: 51
End: 2021-04-07

## 2021-04-08 DIAGNOSIS — F41.1 GAD (GENERALIZED ANXIETY DISORDER): Primary | ICD-10-CM

## 2021-04-08 RX ORDER — FLUOXETINE 20 MG/1
20 TABLET ORAL DAILY
Qty: 30 TABLET | Refills: 11 | Status: SHIPPED | OUTPATIENT
Start: 2021-04-08 | End: 2022-02-09 | Stop reason: SDUPTHER

## 2021-04-09 ENCOUNTER — SPECIALTY PHARMACY (OUTPATIENT)
Dept: PHARMACY | Facility: CLINIC | Age: 51
End: 2021-04-09

## 2021-04-12 ENCOUNTER — SPECIALTY PHARMACY (OUTPATIENT)
Dept: PHARMACY | Facility: CLINIC | Age: 51
End: 2021-04-12

## 2021-04-12 ENCOUNTER — TELEPHONE (OUTPATIENT)
Dept: FAMILY MEDICINE | Facility: CLINIC | Age: 51
End: 2021-04-12

## 2021-04-27 ENCOUNTER — LAB VISIT (OUTPATIENT)
Dept: LAB | Facility: HOSPITAL | Age: 51
End: 2021-04-27
Attending: STUDENT IN AN ORGANIZED HEALTH CARE EDUCATION/TRAINING PROGRAM
Payer: COMMERCIAL

## 2021-04-27 DIAGNOSIS — I10 ESSENTIAL HYPERTENSION: ICD-10-CM

## 2021-04-27 DIAGNOSIS — L40.50 PSORIATIC ARTHRITIS: ICD-10-CM

## 2021-04-27 DIAGNOSIS — L40.9 PSORIASIS: ICD-10-CM

## 2021-04-27 DIAGNOSIS — M25.50 ARTHRALGIA, UNSPECIFIED JOINT: ICD-10-CM

## 2021-04-27 LAB
ALBUMIN SERPL BCP-MCNC: 4.2 G/DL (ref 3.5–5.2)
ALP SERPL-CCNC: 75 U/L (ref 55–135)
ALT SERPL W/O P-5'-P-CCNC: 21 U/L (ref 10–44)
ANION GAP SERPL CALC-SCNC: 10 MMOL/L (ref 8–16)
AST SERPL-CCNC: 19 U/L (ref 10–40)
BASOPHILS # BLD AUTO: 0.01 K/UL (ref 0–0.2)
BASOPHILS NFR BLD: 0.2 % (ref 0–1.9)
BILIRUB SERPL-MCNC: 0.9 MG/DL (ref 0.1–1)
BUN SERPL-MCNC: 11 MG/DL (ref 6–20)
CALCIUM SERPL-MCNC: 9.6 MG/DL (ref 8.7–10.5)
CHLORIDE SERPL-SCNC: 102 MMOL/L (ref 95–110)
CO2 SERPL-SCNC: 26 MMOL/L (ref 23–29)
CREAT SERPL-MCNC: 0.8 MG/DL (ref 0.5–1.4)
CRP SERPL-MCNC: 3.6 MG/L (ref 0–8.2)
DIFFERENTIAL METHOD: NORMAL
EOSINOPHIL # BLD AUTO: 0.1 K/UL (ref 0–0.5)
EOSINOPHIL NFR BLD: 1.8 % (ref 0–8)
ERYTHROCYTE [DISTWIDTH] IN BLOOD BY AUTOMATED COUNT: 14.4 % (ref 11.5–14.5)
ERYTHROCYTE [SEDIMENTATION RATE] IN BLOOD BY WESTERGREN METHOD: 38 MM/HR (ref 0–20)
EST. GFR  (AFRICAN AMERICAN): >60 ML/MIN/1.73 M^2
EST. GFR  (NON AFRICAN AMERICAN): >60 ML/MIN/1.73 M^2
GLUCOSE SERPL-MCNC: 99 MG/DL (ref 70–110)
HCT VFR BLD AUTO: 37.4 % (ref 37–48.5)
HGB BLD-MCNC: 12.1 G/DL (ref 12–16)
IMM GRANULOCYTES # BLD AUTO: 0.02 K/UL (ref 0–0.04)
IMM GRANULOCYTES NFR BLD AUTO: 0.4 % (ref 0–0.5)
LYMPHOCYTES # BLD AUTO: 1.1 K/UL (ref 1–4.8)
LYMPHOCYTES NFR BLD: 22 % (ref 18–48)
MCH RBC QN AUTO: 27.3 PG (ref 27–31)
MCHC RBC AUTO-ENTMCNC: 32.4 G/DL (ref 32–36)
MCV RBC AUTO: 84 FL (ref 82–98)
MONOCYTES # BLD AUTO: 0.4 K/UL (ref 0.3–1)
MONOCYTES NFR BLD: 7.5 % (ref 4–15)
NEUTROPHILS # BLD AUTO: 3.5 K/UL (ref 1.8–7.7)
NEUTROPHILS NFR BLD: 68.1 % (ref 38–73)
NRBC BLD-RTO: 0 /100 WBC
PLATELET # BLD AUTO: 235 K/UL (ref 150–450)
PMV BLD AUTO: 10.4 FL (ref 9.2–12.9)
POTASSIUM SERPL-SCNC: 4 MMOL/L (ref 3.5–5.1)
PROT SERPL-MCNC: 7.5 G/DL (ref 6–8.4)
RBC # BLD AUTO: 4.44 M/UL (ref 4–5.4)
SODIUM SERPL-SCNC: 138 MMOL/L (ref 136–145)
WBC # BLD AUTO: 5.09 K/UL (ref 3.9–12.7)

## 2021-04-27 PROCEDURE — 80053 COMPREHEN METABOLIC PANEL: CPT | Performed by: STUDENT IN AN ORGANIZED HEALTH CARE EDUCATION/TRAINING PROGRAM

## 2021-04-27 PROCEDURE — 85651 RBC SED RATE NONAUTOMATED: CPT | Performed by: STUDENT IN AN ORGANIZED HEALTH CARE EDUCATION/TRAINING PROGRAM

## 2021-04-27 PROCEDURE — 36415 COLL VENOUS BLD VENIPUNCTURE: CPT | Performed by: STUDENT IN AN ORGANIZED HEALTH CARE EDUCATION/TRAINING PROGRAM

## 2021-04-27 PROCEDURE — 86140 C-REACTIVE PROTEIN: CPT | Performed by: STUDENT IN AN ORGANIZED HEALTH CARE EDUCATION/TRAINING PROGRAM

## 2021-04-27 PROCEDURE — 85025 COMPLETE CBC W/AUTO DIFF WBC: CPT | Performed by: STUDENT IN AN ORGANIZED HEALTH CARE EDUCATION/TRAINING PROGRAM

## 2021-05-06 ENCOUNTER — TELEPHONE (OUTPATIENT)
Dept: RHEUMATOLOGY | Facility: CLINIC | Age: 51
End: 2021-05-06

## 2021-05-10 ENCOUNTER — SPECIALTY PHARMACY (OUTPATIENT)
Dept: PHARMACY | Facility: CLINIC | Age: 51
End: 2021-05-10

## 2021-05-10 ENCOUNTER — PATIENT MESSAGE (OUTPATIENT)
Dept: RESEARCH | Facility: HOSPITAL | Age: 51
End: 2021-05-10

## 2021-06-11 ENCOUNTER — OFFICE VISIT (OUTPATIENT)
Dept: RHEUMATOLOGY | Facility: CLINIC | Age: 51
End: 2021-06-11
Payer: COMMERCIAL

## 2021-06-11 VITALS
HEART RATE: 84 BPM | WEIGHT: 243.75 LBS | SYSTOLIC BLOOD PRESSURE: 118 MMHG | BODY MASS INDEX: 44.85 KG/M2 | HEIGHT: 62 IN | DIASTOLIC BLOOD PRESSURE: 74 MMHG

## 2021-06-11 DIAGNOSIS — L40.50 PSORIATIC ARTHRITIS: Primary | ICD-10-CM

## 2021-06-11 DIAGNOSIS — D84.9 IMMUNOSUPPRESSION: ICD-10-CM

## 2021-06-11 PROCEDURE — 1126F PR PAIN SEVERITY QUANTIFIED, NO PAIN PRESENT: ICD-10-PCS | Mod: S$GLB,,, | Performed by: INTERNAL MEDICINE

## 2021-06-11 PROCEDURE — 3008F BODY MASS INDEX DOCD: CPT | Mod: CPTII,S$GLB,, | Performed by: INTERNAL MEDICINE

## 2021-06-11 PROCEDURE — 99214 PR OFFICE/OUTPT VISIT, EST, LEVL IV, 30-39 MIN: ICD-10-PCS | Mod: S$GLB,,, | Performed by: INTERNAL MEDICINE

## 2021-06-11 PROCEDURE — 3008F PR BODY MASS INDEX (BMI) DOCUMENTED: ICD-10-PCS | Mod: CPTII,S$GLB,, | Performed by: INTERNAL MEDICINE

## 2021-06-11 PROCEDURE — 99214 OFFICE O/P EST MOD 30 MIN: CPT | Mod: S$GLB,,, | Performed by: INTERNAL MEDICINE

## 2021-06-11 PROCEDURE — 99999 PR PBB SHADOW E&M-EST. PATIENT-LVL III: CPT | Mod: PBBFAC,,, | Performed by: INTERNAL MEDICINE

## 2021-06-11 PROCEDURE — 1126F AMNT PAIN NOTED NONE PRSNT: CPT | Mod: S$GLB,,, | Performed by: INTERNAL MEDICINE

## 2021-06-11 PROCEDURE — 99999 PR PBB SHADOW E&M-EST. PATIENT-LVL III: ICD-10-PCS | Mod: PBBFAC,,, | Performed by: INTERNAL MEDICINE

## 2021-06-11 RX ORDER — METHYLPREDNISOLONE 4 MG/1
TABLET ORAL
Qty: 1 PACKAGE | Refills: 0 | Status: SHIPPED | OUTPATIENT
Start: 2021-06-11 | End: 2021-10-12

## 2021-06-14 ASSESSMENT — ROUTINE ASSESSMENT OF PATIENT INDEX DATA (RAPID3)
PAIN SCORE: 1
PSYCHOLOGICAL DISTRESS SCORE: 2.2
TOTAL RAPID3 SCORE: 1
PATIENT GLOBAL ASSESSMENT SCORE: 1
MDHAQ FUNCTION SCORE: 0.3

## 2021-06-22 ENCOUNTER — PATIENT MESSAGE (OUTPATIENT)
Dept: FAMILY MEDICINE | Facility: CLINIC | Age: 51
End: 2021-06-22

## 2021-06-22 DIAGNOSIS — I10 ESSENTIAL HYPERTENSION: Primary | ICD-10-CM

## 2021-06-22 RX ORDER — LISINOPRIL AND HYDROCHLOROTHIAZIDE 12.5; 2 MG/1; MG/1
1 TABLET ORAL DAILY
Qty: 90 TABLET | Refills: 1 | Status: SHIPPED | OUTPATIENT
Start: 2021-06-22 | End: 2021-12-28 | Stop reason: SDUPTHER

## 2021-06-22 RX ORDER — LISINOPRIL AND HYDROCHLOROTHIAZIDE 10; 12.5 MG/1; MG/1
1 TABLET ORAL DAILY
Qty: 90 TABLET | Refills: 1 | Status: CANCELLED | OUTPATIENT
Start: 2021-06-22 | End: 2022-06-22

## 2021-06-23 ENCOUNTER — PATIENT MESSAGE (OUTPATIENT)
Dept: FAMILY MEDICINE | Facility: CLINIC | Age: 51
End: 2021-06-23

## 2021-07-21 ENCOUNTER — SPECIALTY PHARMACY (OUTPATIENT)
Dept: PHARMACY | Facility: CLINIC | Age: 51
End: 2021-07-21

## 2021-07-21 ENCOUNTER — TELEPHONE (OUTPATIENT)
Dept: FAMILY MEDICINE | Facility: CLINIC | Age: 51
End: 2021-07-21

## 2021-08-05 ENCOUNTER — PATIENT MESSAGE (OUTPATIENT)
Dept: FAMILY MEDICINE | Facility: CLINIC | Age: 51
End: 2021-08-05

## 2021-08-05 DIAGNOSIS — I10 ESSENTIAL HYPERTENSION: Primary | ICD-10-CM

## 2021-08-05 DIAGNOSIS — E78.2 MIXED HYPERLIPIDEMIA: ICD-10-CM

## 2021-08-06 ENCOUNTER — PATIENT MESSAGE (OUTPATIENT)
Dept: FAMILY MEDICINE | Facility: CLINIC | Age: 51
End: 2021-08-06

## 2021-08-09 ENCOUNTER — LAB VISIT (OUTPATIENT)
Dept: LAB | Facility: HOSPITAL | Age: 51
End: 2021-08-09
Attending: FAMILY MEDICINE
Payer: COMMERCIAL

## 2021-08-09 DIAGNOSIS — I10 ESSENTIAL HYPERTENSION, MALIGNANT: ICD-10-CM

## 2021-08-09 DIAGNOSIS — E78.2 MIXED HYPERLIPIDEMIA: Primary | ICD-10-CM

## 2021-08-09 LAB
CHOLEST SERPL-MCNC: 134 MG/DL (ref 120–199)
CHOLEST/HDLC SERPL: 4.5 {RATIO} (ref 2–5)
HDLC SERPL-MCNC: 30 MG/DL (ref 40–75)
HDLC SERPL: 22.4 % (ref 20–50)
LDLC SERPL CALC-MCNC: 48.6 MG/DL (ref 63–159)
NONHDLC SERPL-MCNC: 104 MG/DL
TRIGL SERPL-MCNC: 277 MG/DL (ref 30–150)
TSH SERPL DL<=0.005 MIU/L-ACNC: 1.52 UIU/ML (ref 0.4–4)

## 2021-08-09 PROCEDURE — 80061 LIPID PANEL: CPT | Performed by: FAMILY MEDICINE

## 2021-08-09 PROCEDURE — 36415 COLL VENOUS BLD VENIPUNCTURE: CPT | Performed by: FAMILY MEDICINE

## 2021-08-09 PROCEDURE — 84443 ASSAY THYROID STIM HORMONE: CPT | Performed by: FAMILY MEDICINE

## 2021-08-12 ENCOUNTER — OFFICE VISIT (OUTPATIENT)
Dept: FAMILY MEDICINE | Facility: CLINIC | Age: 51
End: 2021-08-12
Payer: COMMERCIAL

## 2021-08-12 VITALS
DIASTOLIC BLOOD PRESSURE: 82 MMHG | BODY MASS INDEX: 45.27 KG/M2 | SYSTOLIC BLOOD PRESSURE: 108 MMHG | WEIGHT: 246 LBS | HEIGHT: 62 IN | HEART RATE: 84 BPM

## 2021-08-12 DIAGNOSIS — L40.50 PSORIATIC ARTHRITIS: ICD-10-CM

## 2021-08-12 DIAGNOSIS — E66.09 OBESITY DUE TO EXCESS CALORIES, UNSPECIFIED CLASSIFICATION, UNSPECIFIED WHETHER SERIOUS COMORBIDITY PRESENT: ICD-10-CM

## 2021-08-12 DIAGNOSIS — E78.2 MIXED HYPERLIPIDEMIA: Primary | ICD-10-CM

## 2021-08-12 DIAGNOSIS — I10 ESSENTIAL HYPERTENSION: ICD-10-CM

## 2021-08-12 PROCEDURE — 3074F PR MOST RECENT SYSTOLIC BLOOD PRESSURE < 130 MM HG: ICD-10-PCS | Mod: S$GLB,,, | Performed by: FAMILY MEDICINE

## 2021-08-12 PROCEDURE — 99214 OFFICE O/P EST MOD 30 MIN: CPT | Mod: S$GLB,,, | Performed by: FAMILY MEDICINE

## 2021-08-12 PROCEDURE — 3008F PR BODY MASS INDEX (BMI) DOCUMENTED: ICD-10-PCS | Mod: S$GLB,,, | Performed by: FAMILY MEDICINE

## 2021-08-12 PROCEDURE — 3079F PR MOST RECENT DIASTOLIC BLOOD PRESSURE 80-89 MM HG: ICD-10-PCS | Mod: S$GLB,,, | Performed by: FAMILY MEDICINE

## 2021-08-12 PROCEDURE — 99214 PR OFFICE/OUTPT VISIT, EST, LEVL IV, 30-39 MIN: ICD-10-PCS | Mod: S$GLB,,, | Performed by: FAMILY MEDICINE

## 2021-08-12 PROCEDURE — 3079F DIAST BP 80-89 MM HG: CPT | Mod: S$GLB,,, | Performed by: FAMILY MEDICINE

## 2021-08-12 PROCEDURE — 3008F BODY MASS INDEX DOCD: CPT | Mod: S$GLB,,, | Performed by: FAMILY MEDICINE

## 2021-08-12 PROCEDURE — 3074F SYST BP LT 130 MM HG: CPT | Mod: S$GLB,,, | Performed by: FAMILY MEDICINE

## 2021-08-12 RX ORDER — FENOFIBRATE 145 MG/1
145 TABLET, FILM COATED ORAL DAILY
Qty: 90 TABLET | Refills: 0 | Status: SHIPPED | OUTPATIENT
Start: 2021-08-12 | End: 2021-08-17

## 2021-08-13 ENCOUNTER — SPECIALTY PHARMACY (OUTPATIENT)
Dept: PHARMACY | Facility: CLINIC | Age: 51
End: 2021-08-13

## 2021-08-17 ENCOUNTER — PATIENT MESSAGE (OUTPATIENT)
Dept: FAMILY MEDICINE | Facility: CLINIC | Age: 51
End: 2021-08-17

## 2021-08-17 DIAGNOSIS — E78.2 MIXED HYPERLIPIDEMIA: Primary | ICD-10-CM

## 2021-08-17 RX ORDER — FENOFIBRATE 160 MG/1
160 TABLET ORAL DAILY
Qty: 90 TABLET | Refills: 1 | Status: SHIPPED | OUTPATIENT
Start: 2021-08-17 | End: 2022-02-09 | Stop reason: SDUPTHER

## 2021-09-14 DIAGNOSIS — L40.9 PSORIASIS: ICD-10-CM

## 2021-09-14 DIAGNOSIS — M25.50 ARTHRALGIA, UNSPECIFIED JOINT: ICD-10-CM

## 2021-09-14 DIAGNOSIS — I10 ESSENTIAL HYPERTENSION: ICD-10-CM

## 2021-09-14 DIAGNOSIS — L40.50 PSORIATIC ARTHRITIS: ICD-10-CM

## 2021-09-16 RX ORDER — SECUKINUMAB 150 MG/ML
300 INJECTION SUBCUTANEOUS
Qty: 2 ML | Refills: 5 | Status: SHIPPED | OUTPATIENT
Start: 2021-09-16 | End: 2022-03-21 | Stop reason: SDUPTHER

## 2021-09-21 ENCOUNTER — SPECIALTY PHARMACY (OUTPATIENT)
Dept: PHARMACY | Facility: CLINIC | Age: 51
End: 2021-09-21

## 2021-10-12 ENCOUNTER — OFFICE VISIT (OUTPATIENT)
Dept: RHEUMATOLOGY | Facility: CLINIC | Age: 51
End: 2021-10-12
Payer: COMMERCIAL

## 2021-10-12 VITALS
SYSTOLIC BLOOD PRESSURE: 137 MMHG | BODY MASS INDEX: 44.68 KG/M2 | HEART RATE: 98 BPM | DIASTOLIC BLOOD PRESSURE: 77 MMHG | WEIGHT: 242.81 LBS | HEIGHT: 62 IN

## 2021-10-12 DIAGNOSIS — D84.9 IMMUNOSUPPRESSION: ICD-10-CM

## 2021-10-12 DIAGNOSIS — L81.4 OTHER MELANIN HYPERPIGMENTATION: Primary | ICD-10-CM

## 2021-10-12 DIAGNOSIS — L40.50 PSORIATIC ARTHRITIS: ICD-10-CM

## 2021-10-12 PROCEDURE — 1160F RVW MEDS BY RX/DR IN RCRD: CPT | Mod: CPTII,S$GLB,, | Performed by: INTERNAL MEDICINE

## 2021-10-12 PROCEDURE — 3075F PR MOST RECENT SYSTOLIC BLOOD PRESS GE 130-139MM HG: ICD-10-PCS | Mod: CPTII,S$GLB,, | Performed by: INTERNAL MEDICINE

## 2021-10-12 PROCEDURE — 4010F PR ACE/ARB THEARPY RXD/TAKEN: ICD-10-PCS | Mod: CPTII,S$GLB,, | Performed by: INTERNAL MEDICINE

## 2021-10-12 PROCEDURE — 99999 PR PBB SHADOW E&M-EST. PATIENT-LVL III: ICD-10-PCS | Mod: PBBFAC,,, | Performed by: INTERNAL MEDICINE

## 2021-10-12 PROCEDURE — 3078F DIAST BP <80 MM HG: CPT | Mod: CPTII,S$GLB,, | Performed by: INTERNAL MEDICINE

## 2021-10-12 PROCEDURE — 4010F ACE/ARB THERAPY RXD/TAKEN: CPT | Mod: CPTII,S$GLB,, | Performed by: INTERNAL MEDICINE

## 2021-10-12 PROCEDURE — 3075F SYST BP GE 130 - 139MM HG: CPT | Mod: CPTII,S$GLB,, | Performed by: INTERNAL MEDICINE

## 2021-10-12 PROCEDURE — 1159F MED LIST DOCD IN RCRD: CPT | Mod: CPTII,S$GLB,, | Performed by: INTERNAL MEDICINE

## 2021-10-12 PROCEDURE — 3008F PR BODY MASS INDEX (BMI) DOCUMENTED: ICD-10-PCS | Mod: CPTII,S$GLB,, | Performed by: INTERNAL MEDICINE

## 2021-10-12 PROCEDURE — 99214 OFFICE O/P EST MOD 30 MIN: CPT | Mod: S$GLB,,, | Performed by: INTERNAL MEDICINE

## 2021-10-12 PROCEDURE — 3078F PR MOST RECENT DIASTOLIC BLOOD PRESSURE < 80 MM HG: ICD-10-PCS | Mod: CPTII,S$GLB,, | Performed by: INTERNAL MEDICINE

## 2021-10-12 PROCEDURE — 1159F PR MEDICATION LIST DOCUMENTED IN MEDICAL RECORD: ICD-10-PCS | Mod: CPTII,S$GLB,, | Performed by: INTERNAL MEDICINE

## 2021-10-12 PROCEDURE — 99999 PR PBB SHADOW E&M-EST. PATIENT-LVL III: CPT | Mod: PBBFAC,,, | Performed by: INTERNAL MEDICINE

## 2021-10-12 PROCEDURE — 99214 PR OFFICE/OUTPT VISIT, EST, LEVL IV, 30-39 MIN: ICD-10-PCS | Mod: S$GLB,,, | Performed by: INTERNAL MEDICINE

## 2021-10-12 PROCEDURE — 3008F BODY MASS INDEX DOCD: CPT | Mod: CPTII,S$GLB,, | Performed by: INTERNAL MEDICINE

## 2021-10-12 PROCEDURE — 1160F PR REVIEW ALL MEDS BY PRESCRIBER/CLIN PHARMACIST DOCUMENTED: ICD-10-PCS | Mod: CPTII,S$GLB,, | Performed by: INTERNAL MEDICINE

## 2021-10-14 ENCOUNTER — PATIENT MESSAGE (OUTPATIENT)
Dept: PHARMACY | Facility: CLINIC | Age: 51
End: 2021-10-14
Payer: COMMERCIAL

## 2021-10-15 ENCOUNTER — LAB VISIT (OUTPATIENT)
Dept: LAB | Facility: HOSPITAL | Age: 51
End: 2021-10-15
Attending: INTERNAL MEDICINE
Payer: COMMERCIAL

## 2021-10-15 ENCOUNTER — PATIENT MESSAGE (OUTPATIENT)
Dept: RHEUMATOLOGY | Facility: CLINIC | Age: 51
End: 2021-10-15

## 2021-10-15 ENCOUNTER — PATIENT MESSAGE (OUTPATIENT)
Dept: FAMILY MEDICINE | Facility: CLINIC | Age: 51
End: 2021-10-15

## 2021-10-15 DIAGNOSIS — D84.9 IMMUNOSUPPRESSION: ICD-10-CM

## 2021-10-15 DIAGNOSIS — L40.50 PSORIATIC ARTHRITIS: ICD-10-CM

## 2021-10-15 LAB
ALBUMIN SERPL BCP-MCNC: 4.5 G/DL (ref 3.5–5.2)
ALP SERPL-CCNC: 41 U/L (ref 55–135)
ALT SERPL W/O P-5'-P-CCNC: 33 U/L (ref 10–44)
ANION GAP SERPL CALC-SCNC: 11 MMOL/L (ref 8–16)
AST SERPL-CCNC: 36 U/L (ref 10–40)
BASOPHILS # BLD AUTO: 0.01 K/UL (ref 0–0.2)
BASOPHILS NFR BLD: 0.2 % (ref 0–1.9)
BILIRUB SERPL-MCNC: 0.5 MG/DL (ref 0.1–1)
BUN SERPL-MCNC: 22 MG/DL (ref 6–20)
CALCIUM SERPL-MCNC: 10 MG/DL (ref 8.7–10.5)
CHLORIDE SERPL-SCNC: 104 MMOL/L (ref 95–110)
CO2 SERPL-SCNC: 22 MMOL/L (ref 23–29)
CREAT SERPL-MCNC: 1.1 MG/DL (ref 0.5–1.4)
CRP SERPL-MCNC: 3.9 MG/L (ref 0–8.2)
DIFFERENTIAL METHOD: ABNORMAL
EOSINOPHIL # BLD AUTO: 0.1 K/UL (ref 0–0.5)
EOSINOPHIL NFR BLD: 1.6 % (ref 0–8)
ERYTHROCYTE [DISTWIDTH] IN BLOOD BY AUTOMATED COUNT: 13.8 % (ref 11.5–14.5)
ERYTHROCYTE [SEDIMENTATION RATE] IN BLOOD BY WESTERGREN METHOD: 20 MM/HR (ref 0–20)
EST. GFR  (AFRICAN AMERICAN): >60 ML/MIN/1.73 M^2
EST. GFR  (NON AFRICAN AMERICAN): 59 ML/MIN/1.73 M^2
GLUCOSE SERPL-MCNC: 102 MG/DL (ref 70–110)
HCT VFR BLD AUTO: 37 % (ref 37–48.5)
HGB BLD-MCNC: 11.9 G/DL (ref 12–16)
IMM GRANULOCYTES # BLD AUTO: 0.01 K/UL (ref 0–0.04)
IMM GRANULOCYTES NFR BLD AUTO: 0.2 % (ref 0–0.5)
LYMPHOCYTES # BLD AUTO: 1 K/UL (ref 1–4.8)
LYMPHOCYTES NFR BLD: 23.8 % (ref 18–48)
MCH RBC QN AUTO: 26.8 PG (ref 27–31)
MCHC RBC AUTO-ENTMCNC: 32.2 G/DL (ref 32–36)
MCV RBC AUTO: 83 FL (ref 82–98)
MONOCYTES # BLD AUTO: 0.4 K/UL (ref 0.3–1)
MONOCYTES NFR BLD: 9.5 % (ref 4–15)
NEUTROPHILS # BLD AUTO: 2.8 K/UL (ref 1.8–7.7)
NEUTROPHILS NFR BLD: 64.7 % (ref 38–73)
NRBC BLD-RTO: 0 /100 WBC
PLATELET # BLD AUTO: 316 K/UL (ref 150–450)
PMV BLD AUTO: 10.6 FL (ref 9.2–12.9)
POTASSIUM SERPL-SCNC: 3.7 MMOL/L (ref 3.5–5.1)
PROT SERPL-MCNC: 8.2 G/DL (ref 6–8.4)
RBC # BLD AUTO: 4.44 M/UL (ref 4–5.4)
SODIUM SERPL-SCNC: 137 MMOL/L (ref 136–145)
WBC # BLD AUTO: 4.33 K/UL (ref 3.9–12.7)

## 2021-10-15 PROCEDURE — 86140 C-REACTIVE PROTEIN: CPT | Performed by: INTERNAL MEDICINE

## 2021-10-15 PROCEDURE — 85025 COMPLETE CBC W/AUTO DIFF WBC: CPT | Performed by: INTERNAL MEDICINE

## 2021-10-15 PROCEDURE — 85651 RBC SED RATE NONAUTOMATED: CPT | Performed by: INTERNAL MEDICINE

## 2021-10-15 PROCEDURE — 36415 COLL VENOUS BLD VENIPUNCTURE: CPT | Performed by: INTERNAL MEDICINE

## 2021-10-15 PROCEDURE — 80053 COMPREHEN METABOLIC PANEL: CPT | Performed by: INTERNAL MEDICINE

## 2021-10-18 ENCOUNTER — PATIENT MESSAGE (OUTPATIENT)
Dept: FAMILY MEDICINE | Facility: CLINIC | Age: 51
End: 2021-10-18

## 2021-10-21 ENCOUNTER — SPECIALTY PHARMACY (OUTPATIENT)
Dept: PHARMACY | Facility: CLINIC | Age: 51
End: 2021-10-21

## 2021-10-27 ENCOUNTER — PATIENT MESSAGE (OUTPATIENT)
Dept: FAMILY MEDICINE | Facility: CLINIC | Age: 51
End: 2021-10-27
Payer: COMMERCIAL

## 2021-10-29 ENCOUNTER — PATIENT MESSAGE (OUTPATIENT)
Dept: FAMILY MEDICINE | Facility: CLINIC | Age: 51
End: 2021-10-29
Payer: COMMERCIAL

## 2021-11-16 ENCOUNTER — SPECIALTY PHARMACY (OUTPATIENT)
Dept: PHARMACY | Facility: CLINIC | Age: 51
End: 2021-11-16
Payer: COMMERCIAL

## 2021-11-30 ENCOUNTER — CLINICAL SUPPORT (OUTPATIENT)
Dept: URGENT CARE | Facility: CLINIC | Age: 51
End: 2021-11-30
Payer: COMMERCIAL

## 2021-11-30 DIAGNOSIS — Z11.52 ENCOUNTER FOR SCREENING LABORATORY TESTING FOR COVID-19 VIRUS: Primary | ICD-10-CM

## 2021-11-30 PROCEDURE — 99211 PR OFFICE/OUTPT VISIT, EST, LEVL I: ICD-10-PCS | Mod: S$GLB,,, | Performed by: FAMILY MEDICINE

## 2021-11-30 PROCEDURE — 99211 OFF/OP EST MAY X REQ PHY/QHP: CPT | Mod: S$GLB,,, | Performed by: FAMILY MEDICINE

## 2021-11-30 PROCEDURE — U0003 INFECTIOUS AGENT DETECTION BY NUCLEIC ACID (DNA OR RNA); SEVERE ACUTE RESPIRATORY SYNDROME CORONAVIRUS 2 (SARS-COV-2) (CORONAVIRUS DISEASE [COVID-19]), AMPLIFIED PROBE TECHNIQUE, MAKING USE OF HIGH THROUGHPUT TECHNOLOGIES AS DESCRIBED BY CMS-2020-01-R: HCPCS | Performed by: FAMILY MEDICINE

## 2021-11-30 PROCEDURE — U0005 INFEC AGEN DETEC AMPLI PROBE: HCPCS | Performed by: FAMILY MEDICINE

## 2021-12-01 ENCOUNTER — TELEPHONE (OUTPATIENT)
Dept: URGENT CARE | Facility: CLINIC | Age: 51
End: 2021-12-01
Payer: COMMERCIAL

## 2021-12-01 LAB
SARS-COV-2 RNA RESP QL NAA+PROBE: NOT DETECTED
SARS-COV-2- CYCLE NUMBER: NORMAL

## 2021-12-18 ENCOUNTER — SPECIALTY PHARMACY (OUTPATIENT)
Dept: PHARMACY | Facility: CLINIC | Age: 51
End: 2021-12-18
Payer: COMMERCIAL

## 2021-12-28 ENCOUNTER — PATIENT MESSAGE (OUTPATIENT)
Dept: FAMILY MEDICINE | Facility: CLINIC | Age: 51
End: 2021-12-28
Payer: COMMERCIAL

## 2021-12-28 DIAGNOSIS — I10 ESSENTIAL HYPERTENSION: ICD-10-CM

## 2021-12-28 RX ORDER — LISINOPRIL AND HYDROCHLOROTHIAZIDE 12.5; 2 MG/1; MG/1
1 TABLET ORAL DAILY
Qty: 90 TABLET | Refills: 1 | Status: SHIPPED | OUTPATIENT
Start: 2021-12-28 | End: 2022-02-09 | Stop reason: SDUPTHER

## 2022-01-18 ENCOUNTER — SPECIALTY PHARMACY (OUTPATIENT)
Dept: PHARMACY | Facility: CLINIC | Age: 52
End: 2022-01-18
Payer: COMMERCIAL

## 2022-01-18 NOTE — TELEPHONE ENCOUNTER
Specialty Pharmacy - Refill Coordination    Specialty Medication Orders Linked to Encounter    Flowsheet Row Most Recent Value   Medication #1 COSENTYX PEN, 2 PENS, 150 mg/mL PnIj (Order#[id number], Rx#[id number])          Refill Questions - Documented Responses    Flowsheet Row Most Recent Value   Patient Availability and HIPAA Verification    Does patient want to proceed with activity? Yes   HIPAA/medical authority confirmed? Yes   Relationship to patient of person spoken to? Self   Refill Screening Questions    Changes to allergies? No   Changes to medications? No   New conditions since last clinic visit? No   Unplanned office visit, urgent care, ED, or hospital admission in the last 4 weeks? No   How does patient/caregiver feel medication is working? Good   Financial problems or insurance changes? No   How many doses of your specialty medications were missed in the last 4 weeks? 0   Would patient like to speak to a pharmacist? No   When does the patient need to receive the medication? 01/25/22   Refill Delivery Questions    How will the patient receive the medication? Delivery Za   When does the patient need to receive the medication? 01/25/22   Shipping Address Home   Address in [facility] confirmed and updated if neccessary? Yes   Expected Copay ($) 0   Is the patient able to afford the medication copay? Yes   Payment Method zero copay   Days supply of Refill 30   Supplies needed? No supplies needed   Refill activity completed? Yes   Refill activity plan Refill scheduled   Shipment/Pickup Date: 01/20/22          Current Outpatient Medications   Medication Sig    allopurinoL (ZYLOPRIM) 300 MG tablet Take 1 tablet (300 mg total) by mouth once daily.    COSENTYX PEN, 2 PENS, 150 mg/mL PnIj Inject 2 pens (300 mg) into the skin every 30 days.    fenofibrate 160 MG Tab Take 1 tablet (160 mg total) by mouth once daily.    FLUoxetine 20 MG tablet Take 1 tablet (20 mg total) by mouth once daily.    folic  acid (FOLVITE) 1 MG tablet Take 1 tablet (1 mg total) by mouth Daily.    hydroquinone 2 % Crea Apply thin layer to affected skin BID    krill-om-3-dha-epa-phospho-ast (MEGARED OMEGA-3 KRILL OIL) 1,000-230-60 mg Cap Take 1 capsule by mouth once daily. (Patient not taking: Reported on 8/12/2021)    lisinopriL-hydrochlorothiazide (PRINZIDE,ZESTORETIC) 20-12.5 mg per tablet Take 1 tablet by mouth once daily.   Last reviewed on 11/8/2021  2:36 AM by Ruth Payne DO    Review of patient's allergies indicates:   Allergen Reactions    Pistachio nut     Last reviewed on  12/28/2021 8:52 AM by Sherin Harry      Tasks added this encounter   2/17/2022 - Refill Call (Auto Added)   Tasks due within next 3 months   No tasks due.     Ariel Bobo, PharmD  [facility]  [address] [place] [id number]  Phone: [phone]  Fax: [phone]

## 2022-01-26 ENCOUNTER — TELEPHONE (OUTPATIENT)
Dept: FAMILY MEDICINE | Facility: CLINIC | Age: 52
End: 2022-01-26
Payer: COMMERCIAL

## 2022-01-26 NOTE — TELEPHONE ENCOUNTER
----- Message from RT Alexey sent at 1/19/2022  8:27 AM CST -----    ----- Message -----  From: Oly Coronel MD  Sent: 11/1/2021  12:00 AM CST  To: Oly Coronel Staff    Remind patient to get labs prior to upcoming appointment

## 2022-01-31 ENCOUNTER — LAB VISIT (OUTPATIENT)
Dept: LAB | Facility: HOSPITAL | Age: 52
End: 2022-01-31
Attending: INTERNAL MEDICINE
Payer: COMMERCIAL

## 2022-01-31 ENCOUNTER — TELEPHONE (OUTPATIENT)
Dept: FAMILY MEDICINE | Facility: CLINIC | Age: 52
End: 2022-01-31
Payer: COMMERCIAL

## 2022-01-31 DIAGNOSIS — D84.9 IMMUNOSUPPRESSION: ICD-10-CM

## 2022-01-31 DIAGNOSIS — E78.2 MIXED HYPERLIPIDEMIA: Primary | ICD-10-CM

## 2022-01-31 DIAGNOSIS — L40.50 PSORIATIC ARTHRITIS: ICD-10-CM

## 2022-01-31 LAB
ALBUMIN SERPL BCP-MCNC: 4.4 G/DL (ref 3.5–5.2)
ALP SERPL-CCNC: 44 U/L (ref 55–135)
ALT SERPL W/O P-5'-P-CCNC: 42 U/L (ref 10–44)
ANION GAP SERPL CALC-SCNC: 13 MMOL/L (ref 8–16)
AST SERPL-CCNC: 34 U/L (ref 10–40)
BASOPHILS # BLD AUTO: 0.01 K/UL (ref 0–0.2)
BASOPHILS NFR BLD: 0.2 % (ref 0–1.9)
BILIRUB SERPL-MCNC: 0.5 MG/DL (ref 0.1–1)
BUN SERPL-MCNC: 17 MG/DL (ref 6–20)
CALCIUM SERPL-MCNC: 9.8 MG/DL (ref 8.7–10.5)
CHLORIDE SERPL-SCNC: 105 MMOL/L (ref 95–110)
CO2 SERPL-SCNC: 22 MMOL/L (ref 23–29)
CREAT SERPL-MCNC: 1 MG/DL (ref 0.5–1.4)
CRP SERPL-MCNC: 3.6 MG/L (ref 0–8.2)
DIFFERENTIAL METHOD: ABNORMAL
EOSINOPHIL # BLD AUTO: 0.1 K/UL (ref 0–0.5)
EOSINOPHIL NFR BLD: 1.9 % (ref 0–8)
ERYTHROCYTE [DISTWIDTH] IN BLOOD BY AUTOMATED COUNT: 13.5 % (ref 11.5–14.5)
ERYTHROCYTE [SEDIMENTATION RATE] IN BLOOD BY WESTERGREN METHOD: 26 MM/HR (ref 0–20)
EST. GFR  (AFRICAN AMERICAN): >60 ML/MIN/1.73 M^2
EST. GFR  (NON AFRICAN AMERICAN): >60 ML/MIN/1.73 M^2
GLUCOSE SERPL-MCNC: 82 MG/DL (ref 70–110)
HCT VFR BLD AUTO: 36.6 % (ref 37–48.5)
HGB BLD-MCNC: 12 G/DL (ref 12–16)
IMM GRANULOCYTES # BLD AUTO: 0 K/UL (ref 0–0.04)
IMM GRANULOCYTES NFR BLD AUTO: 0 % (ref 0–0.5)
LYMPHOCYTES # BLD AUTO: 1.2 K/UL (ref 1–4.8)
LYMPHOCYTES NFR BLD: 28.3 % (ref 18–48)
MCH RBC QN AUTO: 27.6 PG (ref 27–31)
MCHC RBC AUTO-ENTMCNC: 32.8 G/DL (ref 32–36)
MCV RBC AUTO: 84 FL (ref 82–98)
MONOCYTES # BLD AUTO: 0.3 K/UL (ref 0.3–1)
MONOCYTES NFR BLD: 6.5 % (ref 4–15)
NEUTROPHILS # BLD AUTO: 2.7 K/UL (ref 1.8–7.7)
NEUTROPHILS NFR BLD: 63.1 % (ref 38–73)
NRBC BLD-RTO: 0 /100 WBC
PLATELET # BLD AUTO: 271 K/UL (ref 150–450)
PMV BLD AUTO: 10.8 FL (ref 9.2–12.9)
POTASSIUM SERPL-SCNC: 4 MMOL/L (ref 3.5–5.1)
PROT SERPL-MCNC: 7.8 G/DL (ref 6–8.4)
RBC # BLD AUTO: 4.35 M/UL (ref 4–5.4)
SODIUM SERPL-SCNC: 140 MMOL/L (ref 136–145)
WBC # BLD AUTO: 4.28 K/UL (ref 3.9–12.7)

## 2022-01-31 PROCEDURE — 85025 COMPLETE CBC W/AUTO DIFF WBC: CPT | Performed by: INTERNAL MEDICINE

## 2022-01-31 PROCEDURE — 85651 RBC SED RATE NONAUTOMATED: CPT | Performed by: INTERNAL MEDICINE

## 2022-01-31 PROCEDURE — 80053 COMPREHEN METABOLIC PANEL: CPT | Performed by: INTERNAL MEDICINE

## 2022-01-31 PROCEDURE — 86140 C-REACTIVE PROTEIN: CPT | Performed by: INTERNAL MEDICINE

## 2022-02-09 ENCOUNTER — OFFICE VISIT (OUTPATIENT)
Dept: FAMILY MEDICINE | Facility: CLINIC | Age: 52
End: 2022-02-09
Payer: COMMERCIAL

## 2022-02-09 VITALS
BODY MASS INDEX: 45.27 KG/M2 | WEIGHT: 246 LBS | DIASTOLIC BLOOD PRESSURE: 68 MMHG | SYSTOLIC BLOOD PRESSURE: 122 MMHG | HEIGHT: 62 IN | HEART RATE: 92 BPM

## 2022-02-09 DIAGNOSIS — E78.2 MIXED HYPERLIPIDEMIA: Primary | ICD-10-CM

## 2022-02-09 DIAGNOSIS — M10.9 GOUT, UNSPECIFIED CAUSE, UNSPECIFIED CHRONICITY, UNSPECIFIED SITE: ICD-10-CM

## 2022-02-09 DIAGNOSIS — I10 ESSENTIAL HYPERTENSION: ICD-10-CM

## 2022-02-09 DIAGNOSIS — D84.9 IMMUNOSUPPRESSION: ICD-10-CM

## 2022-02-09 DIAGNOSIS — F41.1 GAD (GENERALIZED ANXIETY DISORDER): ICD-10-CM

## 2022-02-09 PROCEDURE — 3008F PR BODY MASS INDEX (BMI) DOCUMENTED: ICD-10-PCS | Mod: S$GLB,,, | Performed by: FAMILY MEDICINE

## 2022-02-09 PROCEDURE — 99214 PR OFFICE/OUTPT VISIT, EST, LEVL IV, 30-39 MIN: ICD-10-PCS | Mod: S$GLB,,, | Performed by: FAMILY MEDICINE

## 2022-02-09 PROCEDURE — 3074F SYST BP LT 130 MM HG: CPT | Mod: S$GLB,,, | Performed by: FAMILY MEDICINE

## 2022-02-09 PROCEDURE — 4010F PR ACE/ARB THEARPY RXD/TAKEN: ICD-10-PCS | Mod: S$GLB,,, | Performed by: FAMILY MEDICINE

## 2022-02-09 PROCEDURE — 3008F BODY MASS INDEX DOCD: CPT | Mod: S$GLB,,, | Performed by: FAMILY MEDICINE

## 2022-02-09 PROCEDURE — 3078F DIAST BP <80 MM HG: CPT | Mod: S$GLB,,, | Performed by: FAMILY MEDICINE

## 2022-02-09 PROCEDURE — 4010F ACE/ARB THERAPY RXD/TAKEN: CPT | Mod: S$GLB,,, | Performed by: FAMILY MEDICINE

## 2022-02-09 PROCEDURE — 3078F PR MOST RECENT DIASTOLIC BLOOD PRESSURE < 80 MM HG: ICD-10-PCS | Mod: S$GLB,,, | Performed by: FAMILY MEDICINE

## 2022-02-09 PROCEDURE — 3074F PR MOST RECENT SYSTOLIC BLOOD PRESSURE < 130 MM HG: ICD-10-PCS | Mod: S$GLB,,, | Performed by: FAMILY MEDICINE

## 2022-02-09 PROCEDURE — 99214 OFFICE O/P EST MOD 30 MIN: CPT | Mod: S$GLB,,, | Performed by: FAMILY MEDICINE

## 2022-02-09 RX ORDER — FOLIC ACID 1 MG/1
1 TABLET ORAL DAILY
Qty: 90 TABLET | Refills: 3 | Status: SHIPPED | OUTPATIENT
Start: 2022-02-09 | End: 2023-02-07 | Stop reason: SDUPTHER

## 2022-02-09 RX ORDER — FLUOXETINE 20 MG/1
20 TABLET ORAL DAILY
Qty: 30 TABLET | Refills: 11 | Status: SHIPPED | OUTPATIENT
Start: 2022-02-09 | End: 2022-09-28 | Stop reason: SDUPTHER

## 2022-02-09 RX ORDER — LISINOPRIL AND HYDROCHLOROTHIAZIDE 12.5; 2 MG/1; MG/1
1 TABLET ORAL DAILY
Qty: 90 TABLET | Refills: 1 | Status: SHIPPED | OUTPATIENT
Start: 2022-02-09 | End: 2022-09-28 | Stop reason: SDUPTHER

## 2022-02-09 RX ORDER — ALLOPURINOL 300 MG/1
300 TABLET ORAL DAILY
Qty: 90 TABLET | Refills: 3 | Status: SHIPPED | OUTPATIENT
Start: 2022-02-09 | End: 2023-02-07 | Stop reason: SDUPTHER

## 2022-02-09 RX ORDER — SECUKINUMAB 150 MG/ML
300 INJECTION SUBCUTANEOUS
Qty: 2 ML | Refills: 5 | Status: CANCELLED | OUTPATIENT
Start: 2022-02-09 | End: 2022-03-11

## 2022-02-09 RX ORDER — FENOFIBRATE 160 MG/1
160 TABLET ORAL DAILY
Qty: 90 TABLET | Refills: 1 | Status: SHIPPED | OUTPATIENT
Start: 2022-02-09 | End: 2022-08-16

## 2022-02-09 NOTE — PROGRESS NOTES
SUBJECTIVE:    Patient ID: Oanh Marmolejo is a 51 y.o. female.    Chief Complaint: Follow-up (6mth, went over meds verbally// SW)    Patient with HLD and HTN in for follow up . Changed to fenofibrate and trig improved but othe values worsened. She has been doing well otherwise. BP controlled. She is working to make dietary chnages. Has risk fractures for diabetes and heart disease   She continues to follow with rheumatology for psoriatic arthritis.  She remains on Cosentyx and labs are stable.       Lab Visit on 01/31/2022   Component Date Value Ref Range Status    Cholesterol 01/31/2022 193  120 - 199 mg/dL Final    Triglycerides 01/31/2022 191* 30 - 150 mg/dL Final    HDL 01/31/2022 36* 40 - 75 mg/dL Final    LDL Cholesterol 01/31/2022 118.8  63.0 - 159.0 mg/dL Final    HDL/Cholesterol Ratio 01/31/2022 18.7* 20.0 - 50.0 % Final    Total Cholesterol/HDL Ratio 01/31/2022 5.4* 2.0 - 5.0 Final    Non-HDL Cholesterol 01/31/2022 157  mg/dL Final    Sodium 01/31/2022 139  136 - 145 mmol/L Final    Potassium 01/31/2022 4.0  3.5 - 5.1 mmol/L Final    Chloride 01/31/2022 105  95 - 110 mmol/L Final    CO2 01/31/2022 22* 23 - 29 mmol/L Final    Glucose 01/31/2022 82  70 - 110 mg/dL Final    BUN 01/31/2022 17  6 - 20 mg/dL Final    Creatinine 01/31/2022 1.0  0.5 - 1.4 mg/dL Final    Calcium 01/31/2022 9.9  8.7 - 10.5 mg/dL Final    Total Protein 01/31/2022 7.8  6.0 - 8.4 g/dL Final    Albumin 01/31/2022 4.3  3.5 - 5.2 g/dL Final    Total Bilirubin 01/31/2022 0.5  0.1 - 1.0 mg/dL Final    Alkaline Phosphatase 01/31/2022 45* 55 - 135 U/L Final    AST 01/31/2022 34  10 - 40 U/L Final    ALT 01/31/2022 41  10 - 44 U/L Final    Anion Gap 01/31/2022 12  8 - 16 mmol/L Final    eGFR if African American 01/31/2022 >60  >60 mL/min/1.73 m^2 Final    eGFR if non African American 01/31/2022 >60  >60 mL/min/1.73 m^2 Final   Lab Visit on 01/31/2022   Component Date Value Ref Range Status    WBC 01/31/2022 4.28   3.90 - 12.70 K/uL Final    RBC 01/31/2022 4.35  4.00 - 5.40 M/uL Final    Hemoglobin 01/31/2022 12.0  12.0 - 16.0 g/dL Final    Hematocrit 01/31/2022 36.6* 37.0 - 48.5 % Final    MCV 01/31/2022 84  82 - 98 fL Final    MCH 01/31/2022 27.6  27.0 - 31.0 pg Final    MCHC 01/31/2022 32.8  32.0 - 36.0 g/dL Final    RDW 01/31/2022 13.5  11.5 - 14.5 % Final    Platelets 01/31/2022 271  150 - 450 K/uL Final    MPV 01/31/2022 10.8  9.2 - 12.9 fL Final    Immature Granulocytes 01/31/2022 0.0  0.0 - 0.5 % Final    Gran # (ANC) 01/31/2022 2.7  1.8 - 7.7 K/uL Final    Immature Grans (Abs) 01/31/2022 0.00  0.00 - 0.04 K/uL Final    Lymph # 01/31/2022 1.2  1.0 - 4.8 K/uL Final    Mono # 01/31/2022 0.3  0.3 - 1.0 K/uL Final    Eos # 01/31/2022 0.1  0.0 - 0.5 K/uL Final    Baso # 01/31/2022 0.01  0.00 - 0.20 K/uL Final    nRBC 01/31/2022 0  0 /100 WBC Final    Gran % 01/31/2022 63.1  38.0 - 73.0 % Final    Lymph % 01/31/2022 28.3  18.0 - 48.0 % Final    Mono % 01/31/2022 6.5  4.0 - 15.0 % Final    Eosinophil % 01/31/2022 1.9  0.0 - 8.0 % Final    Basophil % 01/31/2022 0.2  0.0 - 1.9 % Final    Differential Method 01/31/2022 Automated   Final    Sodium 01/31/2022 140  136 - 145 mmol/L Final    Potassium 01/31/2022 4.0  3.5 - 5.1 mmol/L Final    Chloride 01/31/2022 105  95 - 110 mmol/L Final    CO2 01/31/2022 22* 23 - 29 mmol/L Final    Glucose 01/31/2022 82  70 - 110 mg/dL Final    BUN 01/31/2022 17  6 - 20 mg/dL Final    Creatinine 01/31/2022 1.0  0.5 - 1.4 mg/dL Final    Calcium 01/31/2022 9.8  8.7 - 10.5 mg/dL Final    Total Protein 01/31/2022 7.8  6.0 - 8.4 g/dL Final    Albumin 01/31/2022 4.4  3.5 - 5.2 g/dL Final    Total Bilirubin 01/31/2022 0.5  0.1 - 1.0 mg/dL Final    Alkaline Phosphatase 01/31/2022 44* 55 - 135 U/L Final    AST 01/31/2022 34  10 - 40 U/L Final    ALT 01/31/2022 42  10 - 44 U/L Final    Anion Gap 01/31/2022 13  8 - 16 mmol/L Final    eGFR if  01/31/2022  >60  >60 mL/min/1.73 m^2 Final    eGFR if non African American 01/31/2022 >60  >60 mL/min/1.73 m^2 Final    Sed Rate 01/31/2022 26* 0 - 20 mm/Hr Final    CRP 01/31/2022 3.6  0.0 - 8.2 mg/L Final   Clinical Support on 11/30/2021   Component Date Value Ref Range Status    SARS-CoV2 (COVID-19) Qualitative P* 11/30/2021 Not Detected  Not Detected Final    SARS-COV-2- Cycle Number 11/30/2021 N/A   Final   Lab Visit on 10/15/2021   Component Date Value Ref Range Status    Cholesterol 10/15/2021 234* 120 - 199 mg/dL Final    Triglycerides 10/15/2021 197* 30 - 150 mg/dL Final    HDL 10/15/2021 38* 40 - 75 mg/dL Final    LDL Cholesterol 10/15/2021 156.6  63.0 - 159.0 mg/dL Final    HDL/Cholesterol Ratio 10/15/2021 16.2* 20.0 - 50.0 % Final    Total Cholesterol/HDL Ratio 10/15/2021 6.2* 2.0 - 5.0 Final    Non-HDL Cholesterol 10/15/2021 196  mg/dL Final   Lab Visit on 10/15/2021   Component Date Value Ref Range Status    WBC 10/15/2021 4.33  3.90 - 12.70 K/uL Final    RBC 10/15/2021 4.44  4.00 - 5.40 M/uL Final    Hemoglobin 10/15/2021 11.9* 12.0 - 16.0 g/dL Final    Hematocrit 10/15/2021 37.0  37.0 - 48.5 % Final    MCV 10/15/2021 83  82 - 98 fL Final    MCH 10/15/2021 26.8* 27.0 - 31.0 pg Final    MCHC 10/15/2021 32.2  32.0 - 36.0 g/dL Final    RDW 10/15/2021 13.8  11.5 - 14.5 % Final    Platelets 10/15/2021 316  150 - 450 K/uL Final    MPV 10/15/2021 10.6  9.2 - 12.9 fL Final    Immature Granulocytes 10/15/2021 0.2  0.0 - 0.5 % Final    Gran # (ANC) 10/15/2021 2.8  1.8 - 7.7 K/uL Final    Immature Grans (Abs) 10/15/2021 0.01  0.00 - 0.04 K/uL Final    Lymph # 10/15/2021 1.0  1.0 - 4.8 K/uL Final    Mono # 10/15/2021 0.4  0.3 - 1.0 K/uL Final    Eos # 10/15/2021 0.1  0.0 - 0.5 K/uL Final    Baso # 10/15/2021 0.01  0.00 - 0.20 K/uL Final    nRBC 10/15/2021 0  0 /100 WBC Final    Gran % 10/15/2021 64.7  38.0 - 73.0 % Final    Lymph % 10/15/2021 23.8  18.0 - 48.0 % Final    Mono % 10/15/2021 9.5   4.0 - 15.0 % Final    Eosinophil % 10/15/2021 1.6  0.0 - 8.0 % Final    Basophil % 10/15/2021 0.2  0.0 - 1.9 % Final    Differential Method 10/15/2021 Automated   Final    Sodium 10/15/2021 137  136 - 145 mmol/L Final    Potassium 10/15/2021 3.7  3.5 - 5.1 mmol/L Final    Chloride 10/15/2021 104  95 - 110 mmol/L Final    CO2 10/15/2021 22* 23 - 29 mmol/L Final    Glucose 10/15/2021 102  70 - 110 mg/dL Final    BUN 10/15/2021 22* 6 - 20 mg/dL Final    Creatinine 10/15/2021 1.1  0.5 - 1.4 mg/dL Final    Calcium 10/15/2021 10.0  8.7 - 10.5 mg/dL Final    Total Protein 10/15/2021 8.2  6.0 - 8.4 g/dL Final    Albumin 10/15/2021 4.5  3.5 - 5.2 g/dL Final    Total Bilirubin 10/15/2021 0.5  0.1 - 1.0 mg/dL Final    Alkaline Phosphatase 10/15/2021 41* 55 - 135 U/L Final    AST 10/15/2021 36  10 - 40 U/L Final    ALT 10/15/2021 33  10 - 44 U/L Final    Anion Gap 10/15/2021 11  8 - 16 mmol/L Final    eGFR if African American 10/15/2021 >60  >60 mL/min/1.73 m^2 Final    eGFR if non  10/15/2021 59* >60 mL/min/1.73 m^2 Final    Sed Rate 10/15/2021 20  0 - 20 mm/Hr Final    CRP 10/15/2021 3.9  0.0 - 8.2 mg/L Final       Past Medical History:   Diagnosis Date    Arthritis     Back pain     Degenerative disc disease     LIZBETH (generalized anxiety disorder)     Gout flare     High triglycerides     History of gout     Hyperlipidemia     Hypertension     Joint pain     Obese     Pain of left calf     Psoriasis     Swelling of lower extremity      Past Surgical History:   Procedure Laterality Date    NECK SURGERY       Family History   Problem Relation Age of Onset    Diabetes Father 70    Heart disease Father     Hyperlipidemia Father     Hypertension Father     ALS Father     Cancer Maternal Aunt     Cancer Paternal Uncle     Diabetes Maternal Grandmother     Diabetes Paternal Grandmother     Breast cancer Mother 74    Melanoma Neg Hx     Psoriasis Neg Hx     Lupus Neg  Hx     Eczema Neg Hx        Marital Status:   Alcohol History:  reports current alcohol use.  Tobacco History:  reports that she has never smoked. She has never used smokeless tobacco.  Drug History:  reports no history of drug use.    Review of patient's allergies indicates:   Allergen Reactions    Pistachio nut        Current Outpatient Medications:     COSENTYX PEN, 2 PENS, 150 mg/mL PnIj, Inject 2 pens (300 mg) into the skin every 30 days., Disp: 2 mL, Rfl: 5    hydroquinone 2 % Crea, Apply thin layer to affected skin BID, Disp: 70 g, Rfl: 0    allopurinoL (ZYLOPRIM) 300 MG tablet, Take 1 tablet (300 mg total) by mouth once daily., Disp: 90 tablet, Rfl: 3    fenofibrate 160 MG Tab, Take 1 tablet (160 mg total) by mouth once daily., Disp: 90 tablet, Rfl: 1    FLUoxetine 20 MG tablet, Take 1 tablet (20 mg total) by mouth once daily., Disp: 30 tablet, Rfl: 11    folic acid (FOLVITE) 1 MG tablet, Take 1 tablet (1 mg total) by mouth Daily., Disp: 90 tablet, Rfl: 3    lisinopriL-hydrochlorothiazide (PRINZIDE,ZESTORETIC) 20-12.5 mg per tablet, Take 1 tablet by mouth once daily., Disp: 90 tablet, Rfl: 1    Review of Systems   Constitutional: Negative for activity change, fatigue and unexpected weight change.   HENT: Negative for hearing loss, postnasal drip, sinus pressure, sore throat and voice change.    Eyes: Negative for photophobia and visual disturbance.   Respiratory: Negative for cough, shortness of breath and wheezing.    Cardiovascular: Negative for chest pain and palpitations.   Gastrointestinal: Negative for constipation, diarrhea and nausea.   Genitourinary: Negative for difficulty urinating, frequency, hematuria and urgency.   Musculoskeletal: Negative for arthralgias and back pain.   Skin: Negative for rash.   Neurological: Negative for weakness, light-headedness and headaches.   Hematological: Negative for adenopathy. Does not bruise/bleed easily.   Psychiatric/Behavioral: The patient is  "not nervous/anxious.           Objective:      Vitals:    02/09/22 1639   BP: 122/68   Pulse: 92   Weight: 111.6 kg (246 lb)   Height: 5' 2" (1.575 m)     Physical Exam  Vitals reviewed.   Constitutional:       General: She is not in acute distress.     Appearance: Normal appearance. She is well-developed. She is obese.   HENT:      Head: Normocephalic and atraumatic.      Right Ear: External ear normal.      Left Ear: External ear normal.      Nose: Nose normal.      Mouth/Throat:      Mouth: Mucous membranes are moist.   Eyes:      General: Lids are normal.      Conjunctiva/sclera: Conjunctivae normal.      Pupils: Pupils are equal, round, and reactive to light.   Neck:      Thyroid: No thyromegaly.      Vascular: No JVD.      Trachea: No tracheal deviation.   Cardiovascular:      Rate and Rhythm: Normal rate and regular rhythm.      Chest Wall: PMI is not displaced.      Pulses: Normal pulses.      Heart sounds: Normal heart sounds.   Pulmonary:      Effort: Pulmonary effort is normal.      Breath sounds: Normal breath sounds.   Abdominal:      General: Bowel sounds are normal.      Palpations: Abdomen is soft.      Tenderness: There is no abdominal tenderness. There is no guarding or rebound.   Musculoskeletal:         General: No tenderness. Normal range of motion.      Cervical back: Full passive range of motion without pain and neck supple.   Skin:     General: Skin is warm and dry.      Findings: No rash.   Neurological:      General: No focal deficit present.      Mental Status: She is alert and oriented to person, place, and time.   Psychiatric:         Mood and Affect: Mood normal.         Behavior: Behavior normal.           Assessment:       1. Mixed hyperlipidemia    2. Essential hypertension    3. LIZBETH (generalized anxiety disorder)    4. Gout, unspecified cause, unspecified chronicity, unspecified site    5. Immunosuppression         Plan:       Mixed hyperlipidemia  -     fenofibrate 160 MG Tab; Take " 1 tablet (160 mg total) by mouth once daily.  Dispense: 90 tablet; Refill: 1  -     Lipid Panel; Future; Expected date: 07/25/2022    Essential hypertension  -     folic acid (FOLVITE) 1 MG tablet; Take 1 tablet (1 mg total) by mouth Daily.  Dispense: 90 tablet; Refill: 3  -     lisinopriL-hydrochlorothiazide (PRINZIDE,ZESTORETIC) 20-12.5 mg per tablet; Take 1 tablet by mouth once daily.  Dispense: 90 tablet; Refill: 1  -     CBC Auto Differential; Future; Expected date: 07/25/2022    LIZBETH (generalized anxiety disorder)  -     FLUoxetine 20 MG tablet; Take 1 tablet (20 mg total) by mouth once daily.  Dispense: 30 tablet; Refill: 11    Gout, unspecified cause, unspecified chronicity, unspecified site  -     allopurinoL (ZYLOPRIM) 300 MG tablet; Take 1 tablet (300 mg total) by mouth once daily.  Dispense: 90 tablet; Refill: 3  -     Uric Acid; Future; Expected date: 07/25/2022    Immunosuppression  -     CBC Auto Differential; Future; Expected date: 07/25/2022      Follow up in about 6 months (around 8/9/2022) for Annual Physical.

## 2022-02-18 ENCOUNTER — SPECIALTY PHARMACY (OUTPATIENT)
Dept: PHARMACY | Facility: CLINIC | Age: 52
End: 2022-02-18
Payer: COMMERCIAL

## 2022-02-18 NOTE — TELEPHONE ENCOUNTER
Specialty Pharmacy - Refill Coordination    Specialty Medication Orders Linked to Encounter    Flowsheet Row Most Recent Value   Medication #1 COSENTYX PEN, 2 PENS, 150 mg/mL PnIj (Order#[id number], Rx#[id number])          Refill Questions - Documented Responses    Flowsheet Row Most Recent Value   Patient Availability and HIPAA Verification    Does patient want to proceed with activity? Yes   HIPAA/medical authority confirmed? Yes   Relationship to patient of person spoken to? Self   Refill Screening Questions    Changes to allergies? No   Changes to medications? No   New conditions since last clinic visit? No   Unplanned office visit, urgent care, ED, or hospital admission in the last 4 weeks? No   How does patient/caregiver feel medication is working? Good   Financial problems or insurance changes? No   Would patient like to speak to a pharmacist? No   When does the patient need to receive the medication? 02/25/22   Refill Delivery Questions    How will the patient receive the medication? Delivery Za   When does the patient need to receive the medication? 02/25/22   Shipping Address Home   Address in [facility] confirmed and updated if neccessary? Yes   Expected Copay ($) 0   Is the patient able to afford the medication copay? Yes   Payment Method zero copay   Days supply of Refill 30   Supplies needed? No supplies needed   Refill activity completed? Yes   Refill activity plan Refill scheduled   Shipment/Pickup Date: 02/23/22          Current Outpatient Medications   Medication Sig    allopurinoL (ZYLOPRIM) 300 MG tablet Take 1 tablet (300 mg total) by mouth once daily.    COSENTYX PEN, 2 PENS, 150 mg/mL PnIj Inject 2 pens (300 mg) into the skin every 30 days.    fenofibrate 160 MG Tab Take 1 tablet (160 mg total) by mouth once daily.    FLUoxetine 20 MG tablet Take 1 tablet (20 mg total) by mouth once daily.    folic acid (FOLVITE) 1 MG tablet Take 1 tablet (1 mg total) by mouth Daily.     hydroquinone 2 % Crea Apply thin layer to affected skin BID    lisinopriL-hydrochlorothiazide (PRINZIDE,ZESTORETIC) 20-12.5 mg per tablet Take 1 tablet by mouth once daily.   Last reviewed on 2/9/2022  4:39 PM by Geraldine Andrade MA    Review of patient's allergies indicates:   Allergen Reactions    Pistachio nut     Last reviewed on  2/9/2022 4:39 PM by Geraldine Andrade      Tasks added this encounter   No tasks added.   Tasks due within next 3 months   2/17/2022 - Refill Call (Auto Added)     [facility]  [address] [place] [id number]  Phone: [phone]  Fax: [phone]

## 2022-03-10 ENCOUNTER — PATIENT MESSAGE (OUTPATIENT)
Dept: RHEUMATOLOGY | Facility: CLINIC | Age: 52
End: 2022-03-10
Payer: COMMERCIAL

## 2022-03-15 ENCOUNTER — CLINICAL SUPPORT (OUTPATIENT)
Dept: OTHER | Facility: CLINIC | Age: 52
End: 2022-03-15
Payer: COMMERCIAL

## 2022-03-15 DIAGNOSIS — Z00.8 ENCOUNTER FOR OTHER GENERAL EXAMINATION: ICD-10-CM

## 2022-03-15 PROCEDURE — 80061 PR  LIPID PANEL: ICD-10-PCS | Mod: QW,S$GLB,, | Performed by: INTERNAL MEDICINE

## 2022-03-15 PROCEDURE — 82947 ASSAY GLUCOSE BLOOD QUANT: CPT | Mod: QW,S$GLB,, | Performed by: INTERNAL MEDICINE

## 2022-03-15 PROCEDURE — 99401 PREV MED CNSL INDIV APPRX 15: CPT | Mod: S$GLB,,, | Performed by: INTERNAL MEDICINE

## 2022-03-15 PROCEDURE — 99401 PR PREVENT COUNSEL,INDIV,15 MIN: ICD-10-PCS | Mod: S$GLB,,, | Performed by: INTERNAL MEDICINE

## 2022-03-15 PROCEDURE — 80061 LIPID PANEL: CPT | Mod: QW,S$GLB,, | Performed by: INTERNAL MEDICINE

## 2022-03-15 PROCEDURE — 82947 PR  ASSAY QUANTITATIVE,BLOOD GLUCOSE: ICD-10-PCS | Mod: QW,S$GLB,, | Performed by: INTERNAL MEDICINE

## 2022-03-17 VITALS — HEIGHT: 62 IN | BODY MASS INDEX: 44.99 KG/M2

## 2022-03-17 LAB
HDLC SERPL-MCNC: 39 MG/DL
POC CHOLESTEROL, LDL (DOCK): 121.6 MG/DL
POC CHOLESTEROL, TOTAL: 202 MG/DL
POC GLUCOSE, FASTING: 93 MG/DL (ref 60–110)
TRIGL SERPL-MCNC: 207 MG/DL

## 2022-03-21 DIAGNOSIS — L40.9 PSORIASIS: ICD-10-CM

## 2022-03-21 DIAGNOSIS — M25.50 ARTHRALGIA, UNSPECIFIED JOINT: ICD-10-CM

## 2022-03-21 DIAGNOSIS — I10 ESSENTIAL HYPERTENSION: ICD-10-CM

## 2022-03-21 DIAGNOSIS — L40.50 PSORIATIC ARTHRITIS: ICD-10-CM

## 2022-03-23 RX ORDER — SECUKINUMAB 150 MG/ML
300 INJECTION SUBCUTANEOUS
Qty: 2 ML | Refills: 5 | Status: SHIPPED | OUTPATIENT
Start: 2022-03-23 | End: 2022-09-19 | Stop reason: SDUPTHER

## 2022-03-24 ENCOUNTER — SPECIALTY PHARMACY (OUTPATIENT)
Dept: PHARMACY | Facility: CLINIC | Age: 52
End: 2022-03-24
Payer: COMMERCIAL

## 2022-03-24 NOTE — TELEPHONE ENCOUNTER
Specialty Pharmacy - Refill Coordination    Specialty Medication Orders Linked to Encounter    Flowsheet Row Most Recent Value   Medication #1 COSENTYX PEN, 2 PENS, 150 mg/mL PnIj (Order#[id number], Rx#[id number])          Refill Questions - Documented Responses    Flowsheet Row Most Recent Value   Patient Availability and HIPAA Verification    Does patient want to proceed with activity? Yes   HIPAA/medical authority confirmed? Yes   Relationship to patient of person spoken to? Self   Refill Screening Questions    Changes to allergies? No   Changes to medications? No   New conditions since last clinic visit? No   Unplanned office visit, urgent care, ED, or hospital admission in the last 4 weeks? No   How does patient/caregiver feel medication is working? Good   Financial problems or insurance changes? No   How many doses of your specialty medications were missed in the last 4 weeks? 0   Would patient like to speak to a pharmacist? No   When does the patient need to receive the medication? 03/25/22   Refill Delivery Questions    How will the patient receive the medication? Delivery Za   When does the patient need to receive the medication? 03/25/22   Shipping Address Home   Address in [facility] confirmed and updated if neccessary? Yes   Expected Copay ($) 0   Is the patient able to afford the medication copay? Yes   Payment Method zero copay   Days supply of Refill 30   Supplies needed? No supplies needed   Refill activity completed? Yes   Refill activity plan Refill scheduled   Shipment/Pickup Date: 03/28/22          Current Outpatient Medications   Medication Sig    allopurinoL (ZYLOPRIM) 300 MG tablet Take 1 tablet (300 mg total) by mouth once daily.    COSENTYX PEN, 2 PENS, 150 mg/mL PnIj Inject 2 pens (300 mg) into the skin every 30 days.    fenofibrate 160 MG Tab Take 1 tablet (160 mg total) by mouth once daily.    FLUoxetine 20 MG tablet Take 1 tablet (20 mg total) by mouth once daily.    folic  acid (FOLVITE) 1 MG tablet Take 1 tablet (1 mg total) by mouth Daily.    hydroquinone 2 % Crea Apply thin layer to affected skin BID    lisinopriL-hydrochlorothiazide (PRINZIDE,ZESTORETIC) 20-12.5 mg per tablet Take 1 tablet by mouth once daily.   Last reviewed on 2/9/2022  4:39 PM by Geraldine Andrade MA    Review of patient's allergies indicates:   Allergen Reactions    Pistachio nut     Last reviewed on  2/9/2022 4:39 PM by Geraldine Andrade      Tasks added this encounter   No tasks added.   Tasks due within next 3 months   3/20/2022 - Refill Call (Auto Added)  3/24/2022 - Welcome Call  3/24/2022 - Referral Authorization     [facility]  [address] [place] [id number]  Phone: [phone]  Fax: [phone]

## 2022-04-18 ENCOUNTER — PATIENT MESSAGE (OUTPATIENT)
Dept: ADMINISTRATIVE | Facility: OTHER | Age: 52
End: 2022-04-18
Payer: COMMERCIAL

## 2022-04-18 ENCOUNTER — PATIENT MESSAGE (OUTPATIENT)
Dept: PHARMACY | Facility: CLINIC | Age: 52
End: 2022-04-18
Payer: COMMERCIAL

## 2022-04-18 ENCOUNTER — SPECIALTY PHARMACY (OUTPATIENT)
Dept: PHARMACY | Facility: CLINIC | Age: 52
End: 2022-04-18
Payer: COMMERCIAL

## 2022-04-18 NOTE — TELEPHONE ENCOUNTER
Specialty Pharmacy - Refill Coordination    Specialty Medication Orders Linked to Encounter    Flowsheet Row Most Recent Value   Medication #1 COSENTYX PEN, 2 PENS, 150 mg/mL PnIj (Order#[id number], Rx#[id number])          Refill Questions - Documented Responses    Flowsheet Row Most Recent Value   Patient Availability and HIPAA Verification    Does patient want to proceed with activity? Yes   HIPAA/medical authority confirmed? Yes   Relationship to patient of person spoken to? Self   Refill Screening Questions    Changes to allergies? No   Changes to medications? No   New conditions since last clinic visit? No   Unplanned office visit, urgent care, ED, or hospital admission in the last 4 weeks? No   How does patient/caregiver feel medication is working? Good   Financial problems or insurance changes? No   How many doses of your specialty medications were missed in the last 4 weeks? 0   Would patient like to speak to a pharmacist? No   When does the patient need to receive the medication? 04/25/22   Refill Delivery Questions    How will the patient receive the medication? Delivery Za   When does the patient need to receive the medication? 04/25/22   Shipping Address Home   Address in [facility] confirmed and updated if neccessary? Yes   Expected Copay ($) 0   Is the patient able to afford the medication copay? Yes   Payment Method zero copay   Days supply of Refill 30   Supplies needed? No supplies needed   Refill activity completed? Yes   Refill activity plan Refill scheduled   Shipment/Pickup Date: 04/21/22          Current Outpatient Medications   Medication Sig    allopurinoL (ZYLOPRIM) 300 MG tablet Take 1 tablet (300 mg total) by mouth once daily.    COSENTYX PEN, 2 PENS, 150 mg/mL PnIj Inject 2 pens (300 mg) into the skin every 30 days.    fenofibrate 160 MG Tab Take 1 tablet (160 mg total) by mouth once daily.    FLUoxetine 20 MG tablet Take 1 tablet (20 mg total) by mouth once daily.    folic  acid (FOLVITE) 1 MG tablet Take 1 tablet (1 mg total) by mouth Daily.    hydroquinone 2 % Crea Apply thin layer to affected skin BID    lisinopriL-hydrochlorothiazide (PRINZIDE,ZESTORETIC) 20-12.5 mg per tablet Take 1 tablet by mouth once daily.   Last reviewed on 2/9/2022  4:39 PM by Geraldine Andrade MA    Review of patient's allergies indicates:   Allergen Reactions    Pistachio nut     Last reviewed on  2/9/2022 4:39 PM by Geraldine Andrade      Tasks added this encounter   No tasks added.   Tasks due within next 3 months   4/17/2022 - Refill Call (Auto Added)     [facility]  [address] [facility] [address]  Phone: [phone]  Fax: [phone]

## 2022-05-18 ENCOUNTER — SPECIALTY PHARMACY (OUTPATIENT)
Dept: PHARMACY | Facility: CLINIC | Age: 52
End: 2022-05-18
Payer: COMMERCIAL

## 2022-05-18 ENCOUNTER — PATIENT MESSAGE (OUTPATIENT)
Dept: FAMILY MEDICINE | Facility: CLINIC | Age: 52
End: 2022-05-18

## 2022-05-18 NOTE — TELEPHONE ENCOUNTER
Specialty Pharmacy - Refill Coordination    Specialty Medication Orders Linked to Encounter    Flowsheet Row Most Recent Value   Medication #1 COSENTYX PEN, 2 PENS, 150 mg/mL PnIj (Order#[id number], Rx#[id number])          Refill Questions - Documented Responses    Flowsheet Row Most Recent Value   Patient Availability and HIPAA Verification    Does patient want to proceed with activity? Yes   HIPAA/medical authority confirmed? Yes   Relationship to patient of person spoken to? Self   Refill Screening Questions    Changes to allergies? No   Changes to medications? No   New conditions since last clinic visit? No   Unplanned office visit, urgent care, ED, or hospital admission in the last 4 weeks? No   How does patient/caregiver feel medication is working? Good   Financial problems or insurance changes? No   How many doses of your specialty medications were missed in the last 4 weeks? 0   Would patient like to speak to a pharmacist? No   When does the patient need to receive the medication? 05/25/22   Refill Delivery Questions    How will the patient receive the medication? Delivery Za   When does the patient need to receive the medication? 05/25/22   Shipping Address Home   Address in [facility] confirmed and updated if neccessary? Yes   Expected Copay ($) 0   Is the patient able to afford the medication copay? Yes   Payment Method zero copay   Days supply of Refill 30   Supplies needed? No supplies needed   Refill activity completed? Yes   Refill activity plan Refill scheduled   Shipment/Pickup Date: 05/20/22          Current Outpatient Medications   Medication Sig    allopurinoL (ZYLOPRIM) 300 MG tablet Take 1 tablet (300 mg total) by mouth once daily.    COSENTYX PEN, 2 PENS, 150 mg/mL PnIj Inject 2 pens (300 mg) into the skin every 30 days.    fenofibrate 160 MG Tab Take 1 tablet (160 mg total) by mouth once daily.    FLUoxetine 20 MG tablet Take 1 tablet (20 mg total) by mouth once daily.    folic  acid (FOLVITE) 1 MG tablet Take 1 tablet (1 mg total) by mouth Daily.    hydroquinone 2 % Crea Apply thin layer to affected skin BID    lisinopriL-hydrochlorothiazide (PRINZIDE,ZESTORETIC) 20-12.5 mg per tablet Take 1 tablet by mouth once daily.   Last reviewed on 2/9/2022  4:39 PM by Geraldine Andrade MA    Review of patient's allergies indicates:   Allergen Reactions    Pistachio nut     Last reviewed on  2/9/2022 4:39 PM by Geraldine Andrade      Tasks added this encounter   6/17/2022 - Refill Call (Auto Added)   Tasks due within next 3 months   No tasks due.     Zina Hammond, Patient Care Assistant  [facility]  [address]  [facility] [address]  Phone: [phone]  Fax: [phone]

## 2022-05-19 ENCOUNTER — PATIENT MESSAGE (OUTPATIENT)
Dept: FAMILY MEDICINE | Facility: CLINIC | Age: 52
End: 2022-05-19

## 2022-05-23 ENCOUNTER — PATIENT MESSAGE (OUTPATIENT)
Dept: FAMILY MEDICINE | Facility: CLINIC | Age: 52
End: 2022-05-23

## 2022-06-10 DIAGNOSIS — R10.12 LUQ PAIN: ICD-10-CM

## 2022-06-10 DIAGNOSIS — R10.13 ABDOMINAL PAIN, EPIGASTRIC: Primary | ICD-10-CM

## 2022-06-15 ENCOUNTER — SPECIALTY PHARMACY (OUTPATIENT)
Dept: PHARMACY | Facility: CLINIC | Age: 52
End: 2022-06-15
Payer: COMMERCIAL

## 2022-06-15 NOTE — TELEPHONE ENCOUNTER
Specialty Pharmacy - Clinical Reassessment    Specialty Medication Orders Linked to Encounter    Flowsheet Row Most Recent Value   Medication #1 COSENTYX PEN, 2 PENS, 150 mg/mL PnIj (Order#[id number], Rx#[id number])        Patient Diagnosis   L40.9 - Psoriasis  L40.50 - Psoriatic arthritis    Specialty clinical pharmacist review completed for an annual review of reassessment. Reviewed the following areas: current med list, reports of adverse effects, adherence and progress towards therapeutic goals.    Recommendations: none at this time.   Patient is followed by Rheumatology for psoriasis and psoriatic arthritis.    Tasks added this encounter   3/15/2023 - Clinical - Follow Up Assesement (Annual)   Tasks due within next 3 months   6/17/2022 - Refill Call (Auto Added)     Gwen Gillis, PharmD  [facility]  [address] [place] [address]  Phone: [phone]  Fax: [phone]

## 2022-06-17 ENCOUNTER — SPECIALTY PHARMACY (OUTPATIENT)
Dept: PHARMACY | Facility: CLINIC | Age: 52
End: 2022-06-17
Payer: COMMERCIAL

## 2022-06-17 NOTE — TELEPHONE ENCOUNTER
Specialty Pharmacy - Refill Coordination    Specialty Medication Orders Linked to Encounter    Flowsheet Row Most Recent Value   Medication #1 COSENTYX PEN, 2 PENS, 150 mg/mL PnIj (Order#[id number], Rx#[id number])          Refill Questions - Documented Responses    Flowsheet Row Most Recent Value   Patient Availability and HIPAA Verification    Does patient want to proceed with activity? Yes   HIPAA/medical authority confirmed? Yes   Relationship to patient of person spoken to? Self   Refill Screening Questions    Changes to allergies? No   Changes to medications? No   New conditions since last clinic visit? No   Unplanned office visit, urgent care, ED, or hospital admission in the last 4 weeks? No   How does patient/caregiver feel medication is working? Good   Financial problems or insurance changes? No   How many doses of your specialty medications were missed in the last 4 weeks? 0   Would patient like to speak to a pharmacist? No   When does the patient need to receive the medication? 06/25/22   Refill Delivery Questions    How will the patient receive the medication? Delivery Za   When does the patient need to receive the medication? 06/25/22   Shipping Address Home   Address in [facility] confirmed and updated if neccessary? Yes   Expected Copay ($) 0   Is the patient able to afford the medication copay? Yes   Payment Method zero copay   Days supply of Refill 30   Supplies needed? No supplies needed   Refill activity completed? Yes   Refill activity plan Refill scheduled   Shipment/Pickup Date: 06/22/22          Current Outpatient Medications   Medication Sig    allopurinoL (ZYLOPRIM) 300 MG tablet Take 1 tablet (300 mg total) by mouth once daily.    COSENTYX PEN, 2 PENS, 150 mg/mL PnIj Inject 2 pens (300 mg) into the skin every 30 days.    fenofibrate 160 MG Tab Take 1 tablet (160 mg total) by mouth once daily.    FLUoxetine 20 MG tablet Take 1 tablet (20 mg total) by mouth once daily.    folic  acid (FOLVITE) 1 MG tablet Take 1 tablet (1 mg total) by mouth Daily.    hydroquinone 2 % Crea Apply thin layer to affected skin BID    lisinopriL-hydrochlorothiazide (PRINZIDE,ZESTORETIC) 20-12.5 mg per tablet Take 1 tablet by mouth once daily.   Last reviewed on 2/9/2022  4:39 PM by Geraldine Andrade MA    Review of patient's allergies indicates:   Allergen Reactions    Pistachio nut     Last reviewed on  2/9/2022 4:39 PM by Geraldine Andrade      Tasks added this encounter   No tasks added.   Tasks due within next 3 months   6/17/2022 - Refill Call (Auto Added)     [facility]  [address] [facility] [address]  Phone: [phone]  Fax: [phone]

## 2022-07-18 ENCOUNTER — SPECIALTY PHARMACY (OUTPATIENT)
Dept: PHARMACY | Facility: CLINIC | Age: 52
End: 2022-07-18
Payer: COMMERCIAL

## 2022-07-18 NOTE — TELEPHONE ENCOUNTER
Specialty Pharmacy - Refill Coordination    Specialty Medication Orders Linked to Encounter    Flowsheet Row Most Recent Value   Medication #1 COSENTYX PEN, 2 PENS, 150 mg/mL PnIj (Order#[id number], Rx#[id number])          Refill Questions - Documented Responses    Flowsheet Row Most Recent Value   Patient Availability and HIPAA Verification    Does patient want to proceed with activity? Yes   HIPAA/medical authority confirmed? Yes   Relationship to patient of person spoken to? Self   Refill Screening Questions    Changes to allergies? No   Changes to medications? No   New conditions since last clinic visit? No   Unplanned office visit, urgent care, ED, or hospital admission in the last 4 weeks? No   How does patient/caregiver feel medication is working? Good   Financial problems or insurance changes? No   How many doses of your specialty medications were missed in the last 4 weeks? 0   Would patient like to speak to a pharmacist? No   When does the patient need to receive the medication? 07/25/22   Refill Delivery Questions    How will the patient receive the medication? Delivery Za   When does the patient need to receive the medication? 07/25/22   Shipping Address Home   Address in [facility] confirmed and updated if neccessary? Yes   Expected Copay ($) 0   Is the patient able to afford the medication copay? Yes   Payment Method zero copay   Days supply of Refill 30   Supplies needed? No supplies needed   Refill activity completed? Yes   Refill activity plan Refill scheduled   Shipment/Pickup Date: 07/21/22          Current Outpatient Medications   Medication Sig    allopurinoL (ZYLOPRIM) 300 MG tablet Take 1 tablet (300 mg total) by mouth once daily.    COSENTYX PEN, 2 PENS, 150 mg/mL PnIj Inject 2 pens (300 mg) into the skin every 30 days.    fenofibrate 160 MG Tab Take 1 tablet (160 mg total) by mouth once daily.    FLUoxetine 20 MG tablet Take 1 tablet (20 mg total) by mouth once daily.    folic  acid (FOLVITE) 1 MG tablet Take 1 tablet (1 mg total) by mouth Daily.    hydroquinone 2 % Crea Apply thin layer to affected skin BID    lisinopriL-hydrochlorothiazide (PRINZIDE,ZESTORETIC) 20-12.5 mg per tablet Take 1 tablet by mouth once daily.   Last reviewed on 2/9/2022  4:39 PM by Geraldine Andrade MA    Review of patient's allergies indicates:   Allergen Reactions    Pistachio nut     Last reviewed on  2/9/2022 4:39 PM by Geraldine Andrade      Tasks added this encounter   8/18/2022 - Refill Call (Auto Added)   Tasks due within next 3 months   No tasks due.     Daron Portillo, PharmD  [facility]  [address] [facility] [address]  Phone: [phone]  Fax: [phone]

## 2022-07-19 ENCOUNTER — HOSPITAL ENCOUNTER (OUTPATIENT)
Dept: RADIOLOGY | Facility: HOSPITAL | Age: 52
Discharge: HOME | End: 2022-07-19
Attending: INTERNAL MEDICINE
Payer: COMMERCIAL

## 2022-07-19 DIAGNOSIS — R10.12 LUQ PAIN: ICD-10-CM

## 2022-07-19 DIAGNOSIS — R10.13 ABDOMINAL PAIN, EPIGASTRIC: ICD-10-CM

## 2022-07-19 LAB
CREAT SERPL-MCNC: 0.9 MG/DL (ref 0.5–1.4)
SAMPLE: NORMAL

## 2022-07-19 PROCEDURE — 74177 CT ABD & PELVIS W/CONTRAST: CPT | Mod: TC,PO

## 2022-07-19 PROCEDURE — 25500020 PHARM REV CODE 255: Mod: PO | Performed by: INTERNAL MEDICINE

## 2022-07-19 RX ADMIN — IOHEXOL 100 ML: 350 INJECTION, SOLUTION INTRAVENOUS at 10:07

## 2022-07-25 ENCOUNTER — HOSPITAL ENCOUNTER (OUTPATIENT)
Dept: PREADMISSION TESTING | Facility: HOSPITAL | Age: 52
Discharge: HOME | End: 2022-07-25
Attending: INTERNAL MEDICINE
Payer: COMMERCIAL

## 2022-07-25 DIAGNOSIS — Z01.818 PRE-OP TESTING: Primary | ICD-10-CM

## 2022-07-25 LAB
BASOPHILS # BLD AUTO: 0.02 K/UL (ref 0–0.2)
BASOPHILS NFR BLD: 0.4 % (ref 0–1.9)
DIFFERENTIAL METHOD: NORMAL
EOSINOPHIL # BLD AUTO: 0.1 K/UL (ref 0–0.5)
EOSINOPHIL NFR BLD: 1.5 % (ref 0–8)
ERYTHROCYTE [DISTWIDTH] IN BLOOD BY AUTOMATED COUNT: 13.3 % (ref 11.5–14.5)
HCT VFR BLD AUTO: 37.6 % (ref 37–48.5)
HGB BLD-MCNC: 12.3 G/DL (ref 12–16)
IMM GRANULOCYTES # BLD AUTO: 0.02 K/UL (ref 0–0.04)
IMM GRANULOCYTES NFR BLD AUTO: 0.4 % (ref 0–0.5)
LYMPHOCYTES # BLD AUTO: 1.2 K/UL (ref 1–4.8)
LYMPHOCYTES NFR BLD: 27.2 % (ref 18–48)
MCH RBC QN AUTO: 27.5 PG (ref 27–31)
MCHC RBC AUTO-ENTMCNC: 32.7 G/DL (ref 32–36)
MCV RBC AUTO: 84 FL (ref 82–98)
MONOCYTES # BLD AUTO: 0.4 K/UL (ref 0.3–1)
MONOCYTES NFR BLD: 8.6 % (ref 4–15)
NEUTROPHILS # BLD AUTO: 2.8 K/UL (ref 1.8–7.7)
NEUTROPHILS NFR BLD: 61.9 % (ref 38–73)
NRBC BLD-RTO: 0 /100 WBC
PLATELET # BLD AUTO: 262 K/UL (ref 150–450)
PMV BLD AUTO: 10.3 FL (ref 9.2–12.9)
RBC # BLD AUTO: 4.47 M/UL (ref 4–5.4)
WBC # BLD AUTO: 4.56 K/UL (ref 3.9–12.7)

## 2022-07-25 PROCEDURE — 85025 COMPLETE CBC W/AUTO DIFF WBC: CPT | Performed by: INTERNAL MEDICINE

## 2022-07-25 PROCEDURE — 93005 ELECTROCARDIOGRAM TRACING: CPT | Performed by: INTERNAL MEDICINE

## 2022-07-25 PROCEDURE — 93010 EKG 12-LEAD: ICD-10-PCS | Mod: ,,, | Performed by: INTERNAL MEDICINE

## 2022-07-25 PROCEDURE — 93010 ELECTROCARDIOGRAM REPORT: CPT | Mod: ,,, | Performed by: INTERNAL MEDICINE

## 2022-07-27 ENCOUNTER — PATIENT MESSAGE (OUTPATIENT)
Dept: FAMILY MEDICINE | Facility: CLINIC | Age: 52
End: 2022-07-27

## 2022-07-29 ENCOUNTER — ANESTHESIA (OUTPATIENT)
Dept: SURGERY | Facility: HOSPITAL | Age: 52
End: 2022-07-29
Payer: COMMERCIAL

## 2022-07-29 ENCOUNTER — ANESTHESIA EVENT (OUTPATIENT)
Dept: SURGERY | Facility: HOSPITAL | Age: 52
End: 2022-07-29
Payer: COMMERCIAL

## 2022-07-29 ENCOUNTER — HOSPITAL ENCOUNTER (OUTPATIENT)
Facility: HOSPITAL | Age: 52
Discharge: HOME | End: 2022-07-29
Attending: INTERNAL MEDICINE | Admitting: INTERNAL MEDICINE
Payer: COMMERCIAL

## 2022-07-29 VITALS
RESPIRATION RATE: 21 BRPM | HEART RATE: 84 BPM | TEMPERATURE: 98 F | SYSTOLIC BLOOD PRESSURE: 90 MMHG | OXYGEN SATURATION: 100 % | DIASTOLIC BLOOD PRESSURE: 54 MMHG

## 2022-07-29 VITALS
BODY MASS INDEX: 46.1 KG/M2 | SYSTOLIC BLOOD PRESSURE: 109 MMHG | HEART RATE: 82 BPM | TEMPERATURE: 98 F | WEIGHT: 250.5 LBS | HEIGHT: 62 IN | RESPIRATION RATE: 16 BRPM | DIASTOLIC BLOOD PRESSURE: 74 MMHG | OXYGEN SATURATION: 95 %

## 2022-07-29 DIAGNOSIS — Z12.11 SCREENING FOR COLON CANCER: ICD-10-CM

## 2022-07-29 PROCEDURE — 43239 EGD BIOPSY SINGLE/MULTIPLE: CPT | Performed by: INTERNAL MEDICINE

## 2022-07-29 PROCEDURE — 27000671 HC TUBING MICROBORE EXT: Performed by: NURSE ANESTHETIST, CERTIFIED REGISTERED

## 2022-07-29 PROCEDURE — 25000003 PHARM REV CODE 250: Performed by: NURSE ANESTHETIST, CERTIFIED REGISTERED

## 2022-07-29 PROCEDURE — 27201089 HC SNARE, DISP (ANY): Performed by: INTERNAL MEDICINE

## 2022-07-29 PROCEDURE — 27201114 HC TRAP (ANY): Performed by: INTERNAL MEDICINE

## 2022-07-29 PROCEDURE — 27200043 HC FORCEPS, BIOPSY: Performed by: INTERNAL MEDICINE

## 2022-07-29 PROCEDURE — 27000675 HC TUBING MICRODRIP: Performed by: NURSE ANESTHETIST, CERTIFIED REGISTERED

## 2022-07-29 PROCEDURE — 37000008 HC ANESTHESIA 1ST 15 MINUTES: Performed by: INTERNAL MEDICINE

## 2022-07-29 PROCEDURE — 63600175 PHARM REV CODE 636 W HCPCS: Performed by: NURSE ANESTHETIST, CERTIFIED REGISTERED

## 2022-07-29 PROCEDURE — 27202103: Performed by: NURSE ANESTHETIST, CERTIFIED REGISTERED

## 2022-07-29 PROCEDURE — 45385 COLONOSCOPY W/LESION REMOVAL: CPT | Performed by: INTERNAL MEDICINE

## 2022-07-29 PROCEDURE — 37000009 HC ANESTHESIA EA ADD 15 MINS: Performed by: INTERNAL MEDICINE

## 2022-07-29 PROCEDURE — 27100019 HC AMBU BAG ADULT/PED: Performed by: NURSE ANESTHETIST, CERTIFIED REGISTERED

## 2022-07-29 PROCEDURE — 45380 COLONOSCOPY AND BIOPSY: CPT | Performed by: INTERNAL MEDICINE

## 2022-07-29 RX ORDER — PROPOFOL 10 MG/ML
VIAL (ML) INTRAVENOUS
Status: DISCONTINUED | OUTPATIENT
Start: 2022-07-29 | End: 2022-07-29

## 2022-07-29 RX ADMIN — PROPOFOL 50 MG: 10 INJECTION, EMULSION INTRAVENOUS at 10:07

## 2022-07-29 RX ADMIN — SODIUM CHLORIDE: 0.9 INJECTION, SOLUTION INTRAVENOUS at 10:07

## 2022-07-29 RX ADMIN — SODIUM CHLORIDE: 0.9 INJECTION, SOLUTION INTRAVENOUS at 09:07

## 2022-07-29 NOTE — PROVATION PATIENT INSTRUCTIONS
Discharge Summary/Instructions after an Endoscopic Procedure  Patient Name: Oanh Marmolejo  Patient MRN: [id number]  Patient YOB: 1970 Friday, July 29, 2022  Francy Watson MD  RESTRICTIONS:  During your procedure today, you received medications for sedation.  These   medications may affect your judgment, balance and coordination.  Therefore,   for 24 hours, you have the following restrictions:   - DO NOT drive a car, operate machinery, make legal/financial decisions,   sign important papers or drink alcohol.    ACTIVITY:  Today: no heavy lifting, straining or running due to procedural   sedation/anesthesia.  The following day: return to full activity including work.  DIET:  Eat and drink normally unless instructed otherwise.     TREATMENT FOR COMMON SIDE EFFECTS:  - Mild abdominal pain, nausea, belching, bloating or excessive gas:  rest,   eat lightly and use a heating pad.  - Sore Throat: treat with throat lozenges and/or gargle with warm salt   water.  - Because air was used during the procedure, expelling large amounts of air   from your rectum or belching is normal.  - If a bowel prep was taken, you may not have a bowel movement for 1-3 days.    This is normal.  SYMPTOMS TO WATCH FOR AND REPORT TO YOUR PHYSICIAN:  1. Abdominal pain or bloating, other than gas cramps.  2. Chest pain.  3. Back pain.  4. Signs of infection such as: chills or fever occurring within 24 hours   after the procedure.  5. Rectal bleeding, which would show as bright red, maroon, or black stools.   (A tablespoon of blood from the rectum is not serious, especially if   hemorrhoids are present.)  6. Vomiting.  7. Weakness or dizziness.  GO DIRECTLY TO THE NEAREST EMERGENCY ROOM IF YOU HAVE ANY OF THE FOLLOWING:      Difficulty breathing              Chills and/or fever over 101 F   Persistent vomiting and/or vomiting blood   Severe abdominal pain   Severe chest pain   Black, tarry stools   Bleeding- more than one  tablespoon   Any other symptom or condition that you feel may need urgent attention  Your doctor recommends these additional instructions:  If any biopsies were taken, your doctors clinic will contact you in 1 to 2   weeks with any results.  - Await pathology results.   - Perform a colonoscopy today.   - Use Protonix (pantoprazole) 40 mg PO daily for 3 months.   - Discharge patient to home (with escort).  For questions, problems or results please call your physician - Francy Watson MD at Work:  [phone].  [facility], EMERGENCY ROOM PHONE NUMBER: [phone]  IF A COMPLICATION OR EMERGENCY SITUATION ARISES AND YOU ARE UNABLE TO REACH   YOUR PHYSICIAN - GO DIRECTLY TO THE EMERGENCY ROOM.  Francy Watson MD  7/29/2022 10:49:06 AM  This report has been verified and signed electronically.  Dear patient,  As a result of recent federal legislation (The Federal Cures Act), you may   receive lab or pathology results from your procedure in your [facility]   account before your physician is able to contact you. Your physician or   their representative will relay the results to you with their   recommendations at their soonest availability.  Thank you,  PROVATION

## 2022-07-29 NOTE — PROVATION PATIENT INSTRUCTIONS
Discharge Summary/Instructions after an Endoscopic Procedure  Patient Name: Oanh Marmolejo  Patient MRN: [id number]  Patient YOB: 1970 Friday, July 29, 2022  Francy Watson MD  RESTRICTIONS:  During your procedure today, you received medications for sedation.  These   medications may affect your judgment, balance and coordination.  Therefore,   for 24 hours, you have the following restrictions:   - DO NOT drive a car, operate machinery, make legal/financial decisions,   sign important papers or drink alcohol.    ACTIVITY:  Today: no heavy lifting, straining or running due to procedural   sedation/anesthesia.  The following day: return to full activity including work.  DIET:  Eat and drink normally unless instructed otherwise.     TREATMENT FOR COMMON SIDE EFFECTS:  - Mild abdominal pain, nausea, belching, bloating or excessive gas:  rest,   eat lightly and use a heating pad.  - Sore Throat: treat with throat lozenges and/or gargle with warm salt   water.  - Because air was used during the procedure, expelling large amounts of air   from your rectum or belching is normal.  - If a bowel prep was taken, you may not have a bowel movement for 1-3 days.    This is normal.  SYMPTOMS TO WATCH FOR AND REPORT TO YOUR PHYSICIAN:  1. Abdominal pain or bloating, other than gas cramps.  2. Chest pain.  3. Back pain.  4. Signs of infection such as: chills or fever occurring within 24 hours   after the procedure.  5. Rectal bleeding, which would show as bright red, maroon, or black stools.   (A tablespoon of blood from the rectum is not serious, especially if   hemorrhoids are present.)  6. Vomiting.  7. Weakness or dizziness.  GO DIRECTLY TO THE NEAREST EMERGENCY ROOM IF YOU HAVE ANY OF THE FOLLOWING:      Difficulty breathing              Chills and/or fever over 101 F   Persistent vomiting and/or vomiting blood   Severe abdominal pain   Severe chest pain   Black, tarry stools   Bleeding- more than one  tablespoon   Any other symptom or condition that you feel may need urgent attention  Your doctor recommends these additional instructions:  If any biopsies were taken, your doctors clinic will contact you in 1 to 2   weeks with any results.  - Await pathology results.   - Repeat colonoscopy in 5 years for surveillance.   - Discharge patient to home (with escort).  For questions, problems or results please call your physician - Francy Watson MD at Work:  [phone].  [facility], EMERGENCY ROOM PHONE NUMBER: [phone]  IF A COMPLICATION OR EMERGENCY SITUATION ARISES AND YOU ARE UNABLE TO REACH   YOUR PHYSICIAN - GO DIRECTLY TO THE EMERGENCY ROOM.  Francy Watson MD  7/29/2022 10:52:49 AM  This report has been verified and signed electronically.  Dear patient,  As a result of recent federal legislation (The Federal Cures Act), you may   receive lab or pathology results from your procedure in your [facility]   account before your physician is able to contact you. Your physician or   their representative will relay the results to you with their   recommendations at their soonest availability.  Thank you,  PROVATION

## 2022-07-29 NOTE — ANESTHESIA PREPROCEDURE EVALUATION
07/29/2022  Oanh Marmolejo is a 52 y.o., female.      Pre-op Assessment    I have reviewed the Patient Summary Reports.     I have reviewed the Nursing Notes. I have reviewed the NPO Status.   I have reviewed the Medications.     Review of Systems  Anesthesia Hx:  History of prior surgery of interest to airway management or planning: cervical fusion. Denies Family Hx of Anesthesia complications.   Denies Personal Hx of Anesthesia complications.   Social:  Non-Smoker, Social Alcohol Use    Hematology/Oncology:  Hematology Normal   Oncology Normal     EENT/Dental:EENT/Dental Normal   Cardiovascular:   Hypertension hyperlipidemia PSVT per chart, but patient denies.   Pulmonary:  Pulmonary Normal  Denies Sleep Apnea.    Renal/:  Renal/ Normal     Musculoskeletal:   Arthritis  Gout Spine Disorders: ( no neck pain since cervical fusion, just limited range of motion.) cervical and lumbar Degenerative disease    Neurological:  Neurology Normal    Endocrine:  Endocrine Normal  Morbid Obesity / BMI > 40  Dermatological:   Psoriasis   Psych:   anxiety          Physical Exam  General: Well nourished, Cooperative, Alert and Oriented    Airway:  Mallampati: III   Mouth Opening: Normal  TM Distance: > 6 cm  Tongue: Large  Neck ROM: Normal ROM    Dental:  Intact    Chest/Lungs:  Clear to auscultation, Normal Respiratory Rate    Heart:  Rate: Normal  Rhythm: Regular Rhythm  Sounds: Normal        Anesthesia Plan  Type of Anesthesia, risks & benefits discussed:    Anesthesia Type: MAC  Intra-op Monitoring Plan: Standard ASA Monitors  Informed Consent: Informed consent signed with the Patient and all parties understand the risks and agree with anesthesia plan.  All questions answered.   ASA Score: 3  Anesthesia Plan Notes: POM mask    Ready For Surgery From Anesthesia Perspective.     .

## 2022-07-29 NOTE — TRANSFER OF CARE
"Anesthesia Transfer of Care Note    Patient: Oanh Marmolejo    Procedure(s) Performed: Procedure(s) (LRB):  COLONOSCOPY (N/A)  EGD (ESOPHAGOGASTRODUODENOSCOPY) (N/A)    Patient location: GI    Anesthesia Type: MAC    Transport from OR: Transported from OR on room air with adequate spontaneous ventilation    Post pain: adequate analgesia    Post assessment: no apparent anesthetic complications    Post vital signs: stable    Level of consciousness: awake and alert    Nausea/Vomiting: no nausea/vomiting    Complications: none    Transfer of care protocol was followed      Last vitals:   Visit Vitals  BP (!) 107/57 (BP Location: Left arm, Patient Position: Lying)   Pulse 92   Temp 36.5 °C (97.7 °F) (Axillary)   Resp 18   Ht 5' 2" (1.575 m)   Wt 113.6 kg (250 lb 8.1 oz)   SpO2 100%   Breastfeeding No   BMI 45.82 kg/m²     "

## 2022-07-29 NOTE — H&P
[facility], later [facility]  Gastroenterology  H&P    Patient Name: Oanh Marmolejo  MRN: [id number]  Admission Date: 7/29/2022  Code Status: Full Code    Attending Provider: Francy Watson MD   Primary Care Physician: Oly Coronel MD  Principal Problem:<principal problem not specified>    Subjective:     History of Present Illness:  52-year-old female patient with a history of degenerative disc disease involving L5-S1, triglyceridemia, hypertension, psoriasis and psoriatic arthritis presents to the office complaining about epigastric pain.  Feeling of pulling at her belly button with movement.  She reports daily bowel movements but recent foul-smelling stool.  She reports 2-3 bowel movements per day without any bleeding.    She has a history of psoriatic arthritis and has been on Enbrel in the past but recently stopped and flared and was then started on Cosentyx instead.  Past Medical History:   Diagnosis Date    Arthritis     Back pain     Degenerative disc disease     LIZBETH (generalized anxiety disorder)     Gout flare     High triglycerides     History of gout     Hyperlipidemia     Hypertension     Joint pain     Obese     Pain of left calf     Psoriasis     Swelling of lower extremity        Past Surgical History:   Procedure Laterality Date    NECK SURGERY         Review of patient's allergies indicates:   Allergen Reactions    Pistachio nut      Family History     Problem Relation (Age of Onset)    ALS Father    Breast cancer Mother (74)    Cancer Maternal Aunt, Paternal Uncle    Diabetes Father (70), Maternal Grandmother, Paternal Grandmother    Heart disease Father    Hyperlipidemia Father    Hypertension Father        Tobacco Use    Smoking status: Never Smoker    Smokeless tobacco: Never Used   Substance and Sexual Activity    Alcohol use: Yes    Drug use: No    Sexual activity: Not Currently     Review of Systems  Objective:     Vital Signs (Most Recent):    Vital Signs (24h  Range):           There is no height or weight on file to calculate BMI.    No intake or output data in the 24 hours ending 07/29/22 0837    Lines/Drains/Airways     None                 Physical Exam  Physical Exam:  General- Patient alert and oriented x3 in NAD  HEENT- PERRLA, EOMI, OP clear, MMM  Neck- No JVD, Lymphadenopathy, Thyromegaly  CV- Regular rate and rhythm, No Murmur/polo/rubs  Resp- Lungs CTA Bilaterally, No increased WOB  GI- Non tender/non-distended, BS normoactive x4 quads, no HSM  Extrem- No cyanosis, clubbing, edema. Pulses 2+ and symmetric  Neuro- Strength 5/5 flexors/extensors, DTRs 2+ and symmetric, Intact sensation to light touch grossly   Significant Labs:  CBC: No results for input(s): WBC, HGB, HCT, PLT in the last 48 hours.  CMP: No results for input(s): GLU, CALCIUM, ALBUMIN, PROT, NA, K, CO2, CL, BUN, CREATININE, ALKPHOS, ALT, AST, BILITOT in the last 48 hours.  Coagulation: No results for input(s): PT, INR, APTT in the last 48 hours.    Significant Imaging:  CT: I have reviewed all results within the past 24 hours and my personal findings are:  fatty liver. DJD L5-S1, no acute process    Assessment/Plan:     There are no hospital problems to display for this patient.      52-year-old female patient with history of psoriatic arthritis complaining of epigastric pain increased frequency of stool.  She is here for EGD colonoscopy    Francy Watson MD  Gastroenterology  [facility]

## 2022-07-30 ENCOUNTER — PATIENT MESSAGE (OUTPATIENT)
Dept: FAMILY MEDICINE | Facility: CLINIC | Age: 52
End: 2022-07-30

## 2022-08-01 ENCOUNTER — PATIENT MESSAGE (OUTPATIENT)
Dept: FAMILY MEDICINE | Facility: CLINIC | Age: 52
End: 2022-08-01

## 2022-08-13 ENCOUNTER — LAB VISIT (OUTPATIENT)
Dept: LAB | Facility: HOSPITAL | Age: 52
End: 2022-08-13
Attending: FAMILY MEDICINE
Payer: COMMERCIAL

## 2022-08-13 DIAGNOSIS — E78.2 MIXED HYPERLIPIDEMIA: ICD-10-CM

## 2022-08-13 DIAGNOSIS — M10.9 GOUT, UNSPECIFIED CAUSE, UNSPECIFIED CHRONICITY, UNSPECIFIED SITE: ICD-10-CM

## 2022-08-13 LAB
CHOLEST SERPL-MCNC: 218 MG/DL (ref 120–199)
CHOLEST/HDLC SERPL: 7.5 {RATIO} (ref 2–5)
HDLC SERPL-MCNC: 29 MG/DL (ref 40–75)
HDLC SERPL: 13.3 % (ref 20–50)
LDLC SERPL CALC-MCNC: 146.6 MG/DL (ref 63–159)
NONHDLC SERPL-MCNC: 189 MG/DL
TRIGL SERPL-MCNC: 212 MG/DL (ref 30–150)
URATE SERPL-MCNC: 5.3 MG/DL (ref 2.4–5.7)

## 2022-08-13 PROCEDURE — 80061 LIPID PANEL: CPT | Performed by: FAMILY MEDICINE

## 2022-08-13 PROCEDURE — 36415 COLL VENOUS BLD VENIPUNCTURE: CPT | Performed by: FAMILY MEDICINE

## 2022-08-13 PROCEDURE — 84550 ASSAY OF BLOOD/URIC ACID: CPT | Performed by: FAMILY MEDICINE

## 2022-08-15 ENCOUNTER — PATIENT MESSAGE (OUTPATIENT)
Dept: FAMILY MEDICINE | Facility: CLINIC | Age: 52
End: 2022-08-15

## 2022-08-15 DIAGNOSIS — E78.2 MIXED HYPERLIPIDEMIA: Primary | ICD-10-CM

## 2022-08-16 RX ORDER — ROSUVASTATIN CALCIUM 20 MG/1
20 TABLET, COATED ORAL DAILY
Qty: 90 TABLET | Refills: 3 | Status: SHIPPED | OUTPATIENT
Start: 2022-08-16 | End: 2023-08-03 | Stop reason: SDUPTHER

## 2022-08-16 RX ORDER — EZETIMIBE 10 MG/1
10 TABLET ORAL DAILY
Qty: 90 TABLET | Refills: 3 | Status: SHIPPED | OUTPATIENT
Start: 2022-08-16 | End: 2023-08-03 | Stop reason: SDUPTHER

## 2022-08-18 ENCOUNTER — SPECIALTY PHARMACY (OUTPATIENT)
Dept: PHARMACY | Facility: CLINIC | Age: 52
End: 2022-08-18
Payer: COMMERCIAL

## 2022-08-18 NOTE — TELEPHONE ENCOUNTER
Specialty Pharmacy - Refill Coordination    Specialty Medication Orders Linked to Encounter    Flowsheet Row Most Recent Value   Medication #1 COSENTYX PEN, 2 PENS, 150 mg/mL PnIj (Order#[id number], Rx#[id number])          Refill Questions - Documented Responses    Flowsheet Row Most Recent Value   Patient Availability and HIPAA Verification    Does patient want to proceed with activity? Yes   HIPAA/medical authority confirmed? Yes   Relationship to patient of person spoken to? Self   Refill Screening Questions    Changes to allergies? No   Changes to medications? No   New conditions since last clinic visit? No   Unplanned office visit, urgent care, ED, or hospital admission in the last 4 weeks? No   How does patient/caregiver feel medication is working? Very good   Financial problems or insurance changes? No   How many doses of your specialty medications were missed in the last 4 weeks? 0   Would patient like to speak to a pharmacist? No   When does the patient need to receive the medication? 08/25/22   Refill Delivery Questions    How will the patient receive the medication? Delivery Za   When does the patient need to receive the medication? 08/25/22   Shipping Address Home   Address in [facility] confirmed and updated if neccessary? Yes   Expected Copay ($) 0   Is the patient able to afford the medication copay? Yes   Payment Method zero copay   Days supply of Refill 30   Supplies needed? No supplies needed   Refill activity completed? Yes   Refill activity plan Refill scheduled   Shipment/Pickup Date: 08/23/22          Current Outpatient Medications   Medication Sig    allopurinoL (ZYLOPRIM) 300 MG tablet Take 1 tablet (300 mg total) by mouth once daily.    COSENTYX PEN, 2 PENS, 150 mg/mL PnIj Inject 2 pens (300 mg) into the skin every 30 days.    ezetimibe (ZETIA) 10 mg tablet Take 1 tablet (10 mg total) by mouth once daily.    FLUoxetine 20 MG tablet Take 1 tablet (20 mg total) by mouth once  daily.    folic acid (FOLVITE) 1 MG tablet Take 1 tablet (1 mg total) by mouth Daily.    lisinopriL-hydrochlorothiazide (PRINZIDE,ZESTORETIC) 20-12.5 mg per tablet Take 1 tablet by mouth once daily.    rosuvastatin (CRESTOR) 20 MG tablet Take 1 tablet (20 mg total) by mouth once daily.   Last reviewed on 7/29/2022 11:11 AM by Roberto Gao RN    Review of patient's allergies indicates:   Allergen Reactions    Pistachio nut     Last reviewed on  8/16/2022 8:07 PM by Oly Coronel      Tasks added this encounter   9/17/2022 - Refill Call (Auto Added)   Tasks due within next 3 months   No tasks due.     [facility]  [address] [facility] [address]  Phone: [phone]  Fax: [phone]

## 2022-09-13 DIAGNOSIS — Z12.31 OTHER SCREENING MAMMOGRAM: Primary | ICD-10-CM

## 2022-09-19 ENCOUNTER — SPECIALTY PHARMACY (OUTPATIENT)
Dept: PHARMACY | Facility: CLINIC | Age: 52
End: 2022-09-19
Payer: COMMERCIAL

## 2022-09-19 DIAGNOSIS — I10 ESSENTIAL HYPERTENSION: ICD-10-CM

## 2022-09-19 DIAGNOSIS — D84.9 IMMUNOSUPPRESSION: Primary | ICD-10-CM

## 2022-09-19 DIAGNOSIS — L40.9 PSORIASIS: ICD-10-CM

## 2022-09-19 DIAGNOSIS — L40.50 PSORIATIC ARTHRITIS: ICD-10-CM

## 2022-09-19 DIAGNOSIS — M25.50 ARTHRALGIA, UNSPECIFIED JOINT: ICD-10-CM

## 2022-09-19 DIAGNOSIS — R53.81 MALAISE AND FATIGUE: ICD-10-CM

## 2022-09-19 DIAGNOSIS — R53.83 MALAISE AND FATIGUE: ICD-10-CM

## 2022-09-21 ENCOUNTER — PATIENT OUTREACH (OUTPATIENT)
Dept: RHEUMATOLOGY | Facility: CLINIC | Age: 52
End: 2022-09-21
Payer: COMMERCIAL

## 2022-09-21 DIAGNOSIS — L40.9 PSORIASIS: Primary | ICD-10-CM

## 2022-09-21 DIAGNOSIS — D84.9 IMMUNOSUPPRESSION: ICD-10-CM

## 2022-09-21 DIAGNOSIS — L40.50 PSORIATIC ARTHRITIS: ICD-10-CM

## 2022-09-21 PROCEDURE — 99358 PROLONG SERVICE W/O CONTACT: CPT | Mod: S$GLB,,, | Performed by: INTERNAL MEDICINE

## 2022-09-21 PROCEDURE — 99358 PR PROLONGED SERV,NO CONTACT,1ST HR: ICD-10-PCS | Mod: S$GLB,,, | Performed by: INTERNAL MEDICINE

## 2022-09-21 RX ORDER — SECUKINUMAB 150 MG/ML
300 INJECTION SUBCUTANEOUS
Qty: 2 ML | Refills: 5 | Status: SHIPPED | OUTPATIENT
Start: 2022-09-21 | End: 2023-03-20 | Stop reason: SDUPTHER

## 2022-09-21 NOTE — TELEPHONE ENCOUNTER
Patient must have missed an appointment in the spring .  Please get her scheduled again.   Primary did get some labs in July everything looks fine ,so I am sending Cosentyx in for re-authorization.     Prior to her appointment with me let us get a CBC, CMP sed rate CRP QuantiFERON gold and hepatitis.      Patient has been on Cosentyx for psoriatic arthritis with psoriasis.  I have reviewed her chart due to a missed appointment to ensure safety of continuation    Chronic duodenitis noted on upper endoscopy from 07/29/2022.  She would a 5 mm polyp that was removed on colonoscopy.  Pathology negative :  Mild chronic inflammation compatible with GERD no evidence of eosinophils.  Sigmoid polyp tubular adenoma negative for high-grade dysplasia malignancy.  Ascending colon tubular adenoma no high-grade dysplasia or malignancy.  Ileum negative random negative       Otherwise her medications appear to be relatively unchanged.  Dr. Payne

## 2022-09-22 NOTE — TELEPHONE ENCOUNTER
Specialty Pharmacy - Refill Coordination    Specialty Medication Orders Linked to Encounter      Flowsheet Row Most Recent Value   Medication #1 COSENTYX PEN, 2 PENS, 150 mg/mL PnIj (Order#[id number], Rx#[id number])            Refill Questions - Documented Responses      Flowsheet Row Most Recent Value   Patient Availability and HIPAA Verification    Does patient want to proceed with activity? Yes   HIPAA/medical authority confirmed? Yes   Relationship to patient of person spoken to? Self   Refill Screening Questions    Changes to allergies? No   Changes to medications? No   New conditions since last clinic visit? No   Unplanned office visit, urgent care, ED, or hospital admission in the last 4 weeks? No   How does patient/caregiver feel medication is working? Good   Financial problems or insurance changes? No   How many doses of your specialty medications were missed in the last 4 weeks? 0   Would patient like to speak to a pharmacist? No   When does the patient need to receive the medication? 09/25/22   Refill Delivery Questions    How will the patient receive the medication? Delivery Za   When does the patient need to receive the medication? 09/25/22   Shipping Address Home   Address in [facility] confirmed and updated if neccessary? Yes   Expected Copay ($) 0   Is the patient able to afford the medication copay? Yes   Days supply of Refill 30   Supplies needed? No supplies needed   Refill activity completed? Yes   Refill activity plan Refill scheduled   Shipment/Pickup Date: 09/22/22            Current Outpatient Medications   Medication Sig    allopurinoL (ZYLOPRIM) 300 MG tablet Take 1 tablet (300 mg total) by mouth once daily.    COSENTYX PEN, 2 PENS, 150 mg/mL PnIj Inject 2 pens (300 mg) into the skin every 30 days.    ezetimibe (ZETIA) 10 mg tablet Take 1 tablet (10 mg total) by mouth once daily.    FLUoxetine 20 MG tablet Take 1 tablet (20 mg total) by mouth once daily.    folic acid (FOLVITE) 1  MG tablet Take 1 tablet (1 mg total) by mouth Daily.    lisinopriL-hydrochlorothiazide (PRINZIDE,ZESTORETIC) 20-12.5 mg per tablet Take 1 tablet by mouth once daily.    rosuvastatin (CRESTOR) 20 MG tablet Take 1 tablet (20 mg total) by mouth once daily.   Last reviewed on 9/20/2022  2:36 PM by Yoli Cerrato LPN    Review of patient's allergies indicates:   Allergen Reactions    Pistachio nut     Last reviewed on  9/20/2022 2:36 PM by Yoli Cerrato      Tasks added this encounter   10/18/2022 - Refill Call (Auto Added)   Tasks due within next 3 months   No tasks due.     Maddison Luciano, PharmD  [facility]  [address] [place], [place] [address]sharlene  [facility] [address]  Phone: [phone]  Fax: [phone]

## 2022-09-22 NOTE — TELEPHONE ENCOUNTER
Ms Marmolejo has been scheduled for follow up with Dr Payne on 10/26/22 @ 3pm. She is aware of lab orders in epic and prefers to schedule these herself.

## 2022-09-22 NOTE — PROGRESS NOTES
Patient must have missed an appointment in the spring .  Please get her scheduled again.   Primary did get some labs in July everything looks fine ,so I am sending Cosentyx in for re-authorization.     Prior to her appointment with me let us get a CBC, CMP sed rate CRP QuantiFERON gold and hepatitis.        Patient has been on Cosentyx for psoriatic arthritis with psoriasis.  I have reviewed her chart due to a missed appointment to ensure safety of continuation     Chronic duodenitis noted on upper endoscopy from 07/29/2022.  She would a 5 mm polyp that was removed on colonoscopy.  Pathology negative :  Mild chronic inflammation compatible with GERD no evidence of eosinophils.  Sigmoid polyp tubular adenoma negative for high-grade dysplasia malignancy.  Ascending colon tubular adenoma no high-grade dysplasia or malignancy.  Ileum negative random negative        Otherwise her medications appear to be relatively unchanged.  Biologics therapy for psoriatic arthritis chart review and lab review.  Patient is safe to continue medication until we can see her and we will utilize this patient out reach.  As I think she would be negatively affected if I discontinued for missed appt.     She will need labs prior to her next visit.  Dr. Payne

## 2022-09-28 ENCOUNTER — HOSPITAL ENCOUNTER (OUTPATIENT)
Dept: RADIOLOGY | Facility: HOSPITAL | Age: 52
Discharge: HOME | End: 2022-09-28
Attending: FAMILY MEDICINE
Payer: COMMERCIAL

## 2022-09-28 ENCOUNTER — OFFICE VISIT (OUTPATIENT)
Dept: FAMILY MEDICINE | Facility: CLINIC | Age: 52
End: 2022-09-28
Payer: COMMERCIAL

## 2022-09-28 VITALS
BODY MASS INDEX: 45.27 KG/M2 | HEIGHT: 62 IN | DIASTOLIC BLOOD PRESSURE: 74 MMHG | HEART RATE: 90 BPM | WEIGHT: 246 LBS | SYSTOLIC BLOOD PRESSURE: 118 MMHG

## 2022-09-28 DIAGNOSIS — Z00.00 ANNUAL PHYSICAL EXAM: Primary | ICD-10-CM

## 2022-09-28 DIAGNOSIS — K63.5 POLYP OF COLON, UNSPECIFIED PART OF COLON, UNSPECIFIED TYPE: ICD-10-CM

## 2022-09-28 DIAGNOSIS — I10 ESSENTIAL HYPERTENSION: ICD-10-CM

## 2022-09-28 DIAGNOSIS — I47.10 PSVT (PAROXYSMAL SUPRAVENTRICULAR TACHYCARDIA): ICD-10-CM

## 2022-09-28 DIAGNOSIS — Z12.31 OTHER SCREENING MAMMOGRAM: ICD-10-CM

## 2022-09-28 DIAGNOSIS — Z79.899 LONG-TERM USE OF HIGH-RISK MEDICATION: ICD-10-CM

## 2022-09-28 DIAGNOSIS — F41.1 GAD (GENERALIZED ANXIETY DISORDER): ICD-10-CM

## 2022-09-28 DIAGNOSIS — L40.50 PSORIATIC ARTHRITIS: ICD-10-CM

## 2022-09-28 DIAGNOSIS — E78.2 MIXED HYPERLIPIDEMIA: ICD-10-CM

## 2022-09-28 PROCEDURE — 1159F PR MEDICATION LIST DOCUMENTED IN MEDICAL RECORD: ICD-10-PCS | Mod: CPTII,S$GLB,, | Performed by: FAMILY MEDICINE

## 2022-09-28 PROCEDURE — 4010F ACE/ARB THERAPY RXD/TAKEN: CPT | Mod: CPTII,S$GLB,, | Performed by: FAMILY MEDICINE

## 2022-09-28 PROCEDURE — 1160F RVW MEDS BY RX/DR IN RCRD: CPT | Mod: CPTII,S$GLB,, | Performed by: FAMILY MEDICINE

## 2022-09-28 PROCEDURE — 99396 PR PREVENTIVE VISIT,EST,40-64: ICD-10-PCS | Mod: S$GLB,,, | Performed by: FAMILY MEDICINE

## 2022-09-28 PROCEDURE — 3008F PR BODY MASS INDEX (BMI) DOCUMENTED: ICD-10-PCS | Mod: CPTII,S$GLB,, | Performed by: FAMILY MEDICINE

## 2022-09-28 PROCEDURE — 3078F DIAST BP <80 MM HG: CPT | Mod: CPTII,S$GLB,, | Performed by: FAMILY MEDICINE

## 2022-09-28 PROCEDURE — 3074F PR MOST RECENT SYSTOLIC BLOOD PRESSURE < 130 MM HG: ICD-10-PCS | Mod: CPTII,S$GLB,, | Performed by: FAMILY MEDICINE

## 2022-09-28 PROCEDURE — 3074F SYST BP LT 130 MM HG: CPT | Mod: CPTII,S$GLB,, | Performed by: FAMILY MEDICINE

## 2022-09-28 PROCEDURE — 3078F PR MOST RECENT DIASTOLIC BLOOD PRESSURE < 80 MM HG: ICD-10-PCS | Mod: CPTII,S$GLB,, | Performed by: FAMILY MEDICINE

## 2022-09-28 PROCEDURE — 99396 PREV VISIT EST AGE 40-64: CPT | Mod: S$GLB,,, | Performed by: FAMILY MEDICINE

## 2022-09-28 PROCEDURE — 1159F MED LIST DOCD IN RCRD: CPT | Mod: CPTII,S$GLB,, | Performed by: FAMILY MEDICINE

## 2022-09-28 PROCEDURE — 77063 BREAST TOMOSYNTHESIS BI: CPT | Mod: TC,PO

## 2022-09-28 PROCEDURE — 4010F PR ACE/ARB THEARPY RXD/TAKEN: ICD-10-PCS | Mod: CPTII,S$GLB,, | Performed by: FAMILY MEDICINE

## 2022-09-28 PROCEDURE — 3008F BODY MASS INDEX DOCD: CPT | Mod: CPTII,S$GLB,, | Performed by: FAMILY MEDICINE

## 2022-09-28 PROCEDURE — 1160F PR REVIEW ALL MEDS BY PRESCRIBER/CLIN PHARMACIST DOCUMENTED: ICD-10-PCS | Mod: CPTII,S$GLB,, | Performed by: FAMILY MEDICINE

## 2022-09-28 PROCEDURE — 77067 SCR MAMMO BI INCL CAD: CPT | Mod: TC,PO

## 2022-09-28 RX ORDER — LISINOPRIL AND HYDROCHLOROTHIAZIDE 12.5; 2 MG/1; MG/1
1 TABLET ORAL DAILY
Qty: 90 TABLET | Refills: 3 | Status: SHIPPED | OUTPATIENT
Start: 2022-09-28 | End: 2023-08-03 | Stop reason: SDUPTHER

## 2022-09-28 RX ORDER — FLUOXETINE 20 MG/1
20 TABLET ORAL DAILY
Qty: 90 TABLET | Refills: 3 | Status: SHIPPED | OUTPATIENT
Start: 2022-09-28 | End: 2023-02-07 | Stop reason: SDUPTHER

## 2022-09-28 NOTE — PROGRESS NOTES
SUBJECTIVE:   HPI: Oanh Marmolejo  is a 52 y.o. female who presents for annual physical .   Hypertension (Brought bottles//declines flu shot needs refills//bs) and Hyperlipidemia    Patient hypertension presents for her annual exam.  Blood pressure is well controlled on current therapy.  She is up-to-date with recent mammogram that was normal.  She had her 1st screening colonoscopy that demonstrated to benign polyps.  She is instructed to repeat in 5 years.  She is up-to-date with tetanus vaccination but declines all other vaccines.  She has hyperlipidemia that has not been significantly responsive to therapy.  We have had an recent change with her meds and patient has been making adjustments in diet over the past year.  We will recheck at next visit   Mood is stable on fluoxetine.  No episodes of tachycardia.  Continues to follow rheumatology for psoriatic arthritis.        Lab Visit on 08/13/2022   Component Date Value Ref Range Status    Cholesterol 08/13/2022 218 (H)  120 - 199 mg/dL Final    Triglycerides 08/13/2022 212 (H)  30 - 150 mg/dL Final    HDL 08/13/2022 29 (L)  40 - 75 mg/dL Final    LDL Cholesterol 08/13/2022 146.6  63.0 - 159.0 mg/dL Final    HDL/Cholesterol Ratio 08/13/2022 13.3 (L)  20.0 - 50.0 % Final    Total Cholesterol/HDL Ratio 08/13/2022 7.5 (H)  2.0 - 5.0 Final    Non-HDL Cholesterol 08/13/2022 189  mg/dL Final    Uric Acid 08/13/2022 5.3  2.4 - 5.7 mg/dL Final   Hospital Outpatient Visit on 07/25/2022   Component Date Value Ref Range Status    WBC 07/25/2022 4.56  3.90 - 12.70 K/uL Final    RBC 07/25/2022 4.47  4.00 - 5.40 M/uL Final    Hemoglobin 07/25/2022 12.3  12.0 - 16.0 g/dL Final    Hematocrit 07/25/2022 37.6  37.0 - 48.5 % Final    MCV 07/25/2022 84  82 - 98 fL Final    MCH 07/25/2022 27.5  27.0 - 31.0 pg Final    MCHC 07/25/2022 32.7  32.0 - 36.0 g/dL Final    RDW 07/25/2022 13.3  11.5 - 14.5 % Final    Platelets 07/25/2022 262  150 - 450 K/uL Final    MPV 07/25/2022 10.3   9.2 - 12.9 fL Final    Immature Granulocytes 07/25/2022 0.4  0.0 - 0.5 % Final    Gran # (ANC) 07/25/2022 2.8  1.8 - 7.7 K/uL Final    Immature Grans (Abs) 07/25/2022 0.02  0.00 - 0.04 K/uL Final    Lymph # 07/25/2022 1.2  1.0 - 4.8 K/uL Final    Mono # 07/25/2022 0.4  0.3 - 1.0 K/uL Final    Eos # 07/25/2022 0.1  0.0 - 0.5 K/uL Final    Baso # 07/25/2022 0.02  0.00 - 0.20 K/uL Final    nRBC 07/25/2022 0  0 /100 WBC Final    Gran % 07/25/2022 61.9  38.0 - 73.0 % Final    Lymph % 07/25/2022 27.2  18.0 - 48.0 % Final    Mono % 07/25/2022 8.6  4.0 - 15.0 % Final    Eosinophil % 07/25/2022 1.5  0.0 - 8.0 % Final    Basophil % 07/25/2022 0.4  0.0 - 1.9 % Final    Differential Method 07/25/2022 Automated   Final   Lab Visit on 07/25/2022   Component Date Value Ref Range Status    Sodium 07/25/2022 137  136 - 145 mmol/L Final    Potassium 07/25/2022 3.8  3.5 - 5.1 mmol/L Final    Chloride 07/25/2022 101  95 - 110 mmol/L Final    CO2 07/25/2022 26  23 - 29 mmol/L Final    Glucose 07/25/2022 99  70 - 110 mg/dL Final    BUN 07/25/2022 22 (H)  6 - 20 mg/dL Final    Creatinine 07/25/2022 1.0  0.5 - 1.4 mg/dL Final    Calcium 07/25/2022 9.6  8.7 - 10.5 mg/dL Final    Anion Gap 07/25/2022 10  8 - 16 mmol/L Final    eGFR if African American 07/25/2022 >60.0  >60 mL/min/1.73 m^2 Final    eGFR if non African American 07/25/2022 >60.0  >60 mL/min/1.73 m^2 Final   Hospital Outpatient Visit on 07/19/2022   Component Date Value Ref Range Status    POC Creatinine 07/19/2022 0.9  0.5 - 1.4 mg/dL Final    Sample 07/19/2022 VENOUS   Final      (Not in a hospital admission)    Review of patient's allergies indicates:   Allergen Reactions    Pistachio nut      Current Outpatient Medications on File Prior to Visit   Medication Sig Dispense Refill    allopurinoL (ZYLOPRIM) 300 MG tablet Take 1 tablet (300 mg total) by mouth once daily. 90 tablet 3    COSENTYX PEN, 2 PENS, 150 mg/mL PnIj Inject 2 pens (300 mg) into the skin every 30 days. 2 mL 5     ezetimibe (ZETIA) 10 mg tablet Take 1 tablet (10 mg total) by mouth once daily. 90 tablet 3    folic acid (FOLVITE) 1 MG tablet Take 1 tablet (1 mg total) by mouth Daily. 90 tablet 3    rosuvastatin (CRESTOR) 20 MG tablet Take 1 tablet (20 mg total) by mouth once daily. 90 tablet 3    [DISCONTINUED] FLUoxetine 20 MG tablet Take 1 tablet (20 mg total) by mouth once daily. 30 tablet 11    [DISCONTINUED] lisinopriL-hydrochlorothiazide (PRINZIDE,ZESTORETIC) 20-12.5 mg per tablet Take 1 tablet by mouth once daily. 90 tablet 1     No current facility-administered medications on file prior to visit.     Past Medical History:   Diagnosis Date    Arthritis     Back pain     Degenerative disc disease     LIZBETH (generalized anxiety disorder)     Gout flare     High triglycerides     History of gout     Hyperlipidemia     Hypertension     Joint pain     Obese     Pain of left calf     Psoriasis     Swelling of lower extremity      Past Surgical History:   Procedure Laterality Date    COLONOSCOPY N/A 7/29/2022    Procedure: COLONOSCOPY;  Surgeon: Francy Watson MD;  Location: [facility];  Service: Endoscopy;  Laterality: N/A;    ESOPHAGOGASTRODUODENOSCOPY N/A 7/29/2022    Procedure: EGD (ESOPHAGOGASTRODUODENOSCOPY);  Surgeon: Francy Watson MD;  Location: [facility];  Service: Endoscopy;  Laterality: N/A;    NECK SURGERY       Family History   Problem Relation Age of Onset    Diabetes Father 70    Heart disease Father     Hyperlipidemia Father     Hypertension Father     ALS Father     Cancer Maternal Aunt     Cancer Paternal Uncle     Diabetes Maternal Grandmother     Diabetes Paternal Grandmother     Breast cancer Mother 74    Melanoma Neg Hx     Psoriasis Neg Hx     Lupus Neg Hx     Eczema Neg Hx      Social History     Tobacco Use    Smoking status: Never    Smokeless tobacco: Never   Substance Use Topics    Alcohol use: Yes    Drug use: No      Health Maintenance Topics with due status: Not Due       Topic Last  "Completion Date    TETANUS VACCINE 02/25/2021    Colorectal Cancer Screening 07/29/2022    Lipid Panel 08/13/2022    Mammogram 09/28/2022     Immunization History   Administered Date(s) Administered    Tdap 02/25/2021       Review of Systems   Constitutional:  Negative for activity change, fatigue and unexpected weight change.   HENT:  Negative for hearing loss, postnasal drip, sinus pressure, sore throat and voice change.    Eyes:  Negative for photophobia and visual disturbance.   Respiratory:  Negative for cough, shortness of breath and wheezing.    Cardiovascular:  Negative for chest pain and palpitations.   Gastrointestinal:  Negative for constipation, diarrhea and nausea.   Genitourinary:  Negative for difficulty urinating, frequency, hematuria and urgency.   Musculoskeletal:  Negative for arthralgias and back pain.   Skin:  Negative for rash.   Neurological:  Negative for weakness, light-headedness and headaches.   Hematological:  Negative for adenopathy. Does not bruise/bleed easily.   Psychiatric/Behavioral:  The patient is not nervous/anxious.     OBJECTIVE:      Vitals:    09/28/22 1619   BP: 118/74   Pulse: 90   Weight: 111.6 kg (246 lb)   Height: 5' 2" (1.575 m)     Physical Exam  Constitutional:       Appearance: Normal appearance. She is morbidly obese.   HENT:      Head: Normocephalic and atraumatic.      Mouth/Throat:      Mouth: Mucous membranes are moist.   Eyes:      Conjunctiva/sclera: Conjunctivae normal.   Pulmonary:      Effort: Pulmonary effort is normal.   Neurological:      General: No focal deficit present.      Mental Status: She is alert and oriented to person, place, and time.   Psychiatric:         Mood and Affect: Mood normal.         Behavior: Behavior normal.      Assessment:       1. Annual physical exam    2. Essential hypertension    3. Psoriatic arthritis    4. PSVT (paroxysmal supraventricular tachycardia)    5. LIZBETH (generalized anxiety disorder)    6. Mixed hyperlipidemia  "   7. Body mass index (BMI) 40.0-44.9, adult    8. Polyp of colon, unspecified part of colon, unspecified type    9. Long-term use of high-risk medication          Plan:       Annual physical exam    Essential hypertension  -     lisinopriL-hydrochlorothiazide (PRINZIDE,ZESTORETIC) 20-12.5 mg per tablet; Take 1 tablet by mouth once daily.  Dispense: 90 tablet; Refill: 3  -     Lipid Panel; Future; Expected date: 02/01/2023  -     Microalbumin/Creatinine Ratio, Urine; Future; Expected date: 02/01/2023  -     CBC Auto Differential; Future; Expected date: 02/01/2023    Psoriatic arthritis    PSVT (paroxysmal supraventricular tachycardia)    LIZBETH (generalized anxiety disorder)  -     FLUoxetine 20 MG tablet; Take 1 tablet (20 mg total) by mouth once daily.  Dispense: 90 tablet; Refill: 3    Mixed hyperlipidemia  -     Lipid Panel; Future; Expected date: 02/01/2023    Body mass index (BMI) 40.0-44.9, adult    Polyp of colon, unspecified part of colon, unspecified type    Long-term use of high-risk medication  -     CBC Auto Differential; Future; Expected date: 02/01/2023      Counseled on age and gender appropriate medical preventative services, including cancer screenings, immunizations, overall nutritional health, need for a consistent exercise regimen and an overall push towards maintaining a vigorous and active lifestyle.      Follow up in about 4 months (around 1/28/2023) for HTN.

## 2022-10-18 ENCOUNTER — SPECIALTY PHARMACY (OUTPATIENT)
Dept: PHARMACY | Facility: CLINIC | Age: 52
End: 2022-10-18
Payer: COMMERCIAL

## 2022-10-18 NOTE — TELEPHONE ENCOUNTER
Specialty Pharmacy - Refill Coordination  Specialty Pharmacy - Clinical Intervention    Specialty Medication Orders Linked to Encounter      Flowsheet Row Most Recent Value   Medication #1 COSENTYX PEN, 2 PENS, 150 mg/mL PnIj (Order#[id number], Rx#[id number])            Refill Questions - Documented Responses      Flowsheet Row Most Recent Value   Patient Availability and HIPAA Verification    Does patient want to proceed with activity? Yes   HIPAA/medical authority confirmed? Yes   Relationship to patient of person spoken to? Self   Refill Screening Questions    Changes to allergies? No   Changes to medications? Yes  [amoxicillin and steroids]   New conditions since last clinic visit? Yes  [ear infection]   Unplanned office visit, urgent care, ED, or hospital admission in the last 4 weeks? Yes  [urgent care for ear infection]   How does patient/caregiver feel medication is working? Excellent   Financial problems or insurance changes? No   How many doses of your specialty medications were missed in the last 4 weeks? 0   Would patient like to speak to a pharmacist? No   When does the patient need to receive the medication? 10/25/22   Refill Delivery Questions    How will the patient receive the medication? MED[facility]   When does the patient need to receive the medication? 10/25/22   Shipping Address Home   Address in [facility] confirmed and updated if neccessary? Yes   Expected Copay ($) 0   Is the patient able to afford the medication copay? Yes   Payment Method zero copay   Days supply of Refill 30   Supplies needed? Alcohol Swabs   Refill activity completed? Yes   Refill activity plan Refill scheduled   Shipment/Pickup Date: 10/19/22            Current Outpatient Medications   Medication Sig    allopurinoL (ZYLOPRIM) 300 MG tablet Take 1 tablet (300 mg total) by mouth once daily.    COSENTYX PEN, 2 PENS, 150 mg/mL PnIj Inject 2 pens (300 mg) into the skin every 30 days.    ezetimibe (ZETIA) 10 mg tablet Take 1  tablet (10 mg total) by mouth once daily.    FLUoxetine 20 MG tablet Take 1 tablet (20 mg total) by mouth once daily.    folic acid (FOLVITE) 1 MG tablet Take 1 tablet (1 mg total) by mouth Daily.    lisinopriL-hydrochlorothiazide (PRINZIDE,ZESTORETIC) 20-12.5 mg per tablet Take 1 tablet by mouth once daily.    rosuvastatin (CRESTOR) 20 MG tablet Take 1 tablet (20 mg total) by mouth once daily.   Last reviewed on 9/28/2022  4:47 PM by Oly Coronel MD    Review of patient's allergies indicates:   Allergen Reactions    Pistachio nut     Last reviewed on  9/28/2022 4:20 PM by Toya Simpson    Interventions added this encounter   Closed: [facility] Patient Intervention - Drug safety: COSENTYX PEN, 2 PENS, 150 mg/mL PnIj     Tasks added this encounter   11/17/2022 - Refill Call (Auto Added)   Tasks due within next 3 months   No tasks due.     Alfa Beck, PharmD  [facility]  [address] [place] [id number]  Phone: [phone]  Fax: [phone]

## 2022-10-21 ENCOUNTER — LAB VISIT (OUTPATIENT)
Dept: LAB | Facility: HOSPITAL | Age: 52
End: 2022-10-21
Attending: INTERNAL MEDICINE
Payer: COMMERCIAL

## 2022-10-21 DIAGNOSIS — L40.50 PSORIATIC ARTHRITIS: ICD-10-CM

## 2022-10-21 DIAGNOSIS — R53.83 MALAISE AND FATIGUE: ICD-10-CM

## 2022-10-21 DIAGNOSIS — R53.81 MALAISE AND FATIGUE: ICD-10-CM

## 2022-10-21 DIAGNOSIS — D84.9 IMMUNOSUPPRESSION: ICD-10-CM

## 2022-10-21 DIAGNOSIS — L40.9 PSORIASIS: ICD-10-CM

## 2022-10-21 PROCEDURE — 86480 TB TEST CELL IMMUN MEASURE: CPT | Performed by: INTERNAL MEDICINE

## 2022-10-22 LAB
GAMMA INTERFERON BACKGROUND BLD IA-ACNC: 0 IU/ML
M TB IFN-G CD4+ BCKGRND COR BLD-ACNC: 0.01 IU/ML
MITOGEN IGNF BCKGRD COR BLD-ACNC: 1.29 IU/ML
TB GOLD PLUS: NEGATIVE
TB2 - NIL: 0 IU/ML

## 2022-10-26 ENCOUNTER — OFFICE VISIT (OUTPATIENT)
Dept: RHEUMATOLOGY | Facility: CLINIC | Age: 52
End: 2022-10-26
Payer: COMMERCIAL

## 2022-10-26 VITALS
SYSTOLIC BLOOD PRESSURE: 145 MMHG | BODY MASS INDEX: 46.22 KG/M2 | WEIGHT: 251.19 LBS | HEART RATE: 92 BPM | HEIGHT: 62 IN | DIASTOLIC BLOOD PRESSURE: 78 MMHG

## 2022-10-26 DIAGNOSIS — L40.9 PSORIASIS: ICD-10-CM

## 2022-10-26 DIAGNOSIS — L40.50 PSORIATIC ARTHRITIS: Primary | ICD-10-CM

## 2022-10-26 DIAGNOSIS — D84.9 IMMUNOSUPPRESSION: ICD-10-CM

## 2022-10-26 PROCEDURE — 4010F PR ACE/ARB THEARPY RXD/TAKEN: ICD-10-PCS | Mod: CPTII,S$GLB,, | Performed by: INTERNAL MEDICINE

## 2022-10-26 PROCEDURE — 3078F DIAST BP <80 MM HG: CPT | Mod: CPTII,S$GLB,, | Performed by: INTERNAL MEDICINE

## 2022-10-26 PROCEDURE — 1159F PR MEDICATION LIST DOCUMENTED IN MEDICAL RECORD: ICD-10-PCS | Mod: CPTII,S$GLB,, | Performed by: INTERNAL MEDICINE

## 2022-10-26 PROCEDURE — 3077F PR MOST RECENT SYSTOLIC BLOOD PRESSURE >= 140 MM HG: ICD-10-PCS | Mod: CPTII,S$GLB,, | Performed by: INTERNAL MEDICINE

## 2022-10-26 PROCEDURE — 99214 PR OFFICE/OUTPT VISIT, EST, LEVL IV, 30-39 MIN: ICD-10-PCS | Mod: S$GLB,,, | Performed by: INTERNAL MEDICINE

## 2022-10-26 PROCEDURE — 99999 PR PBB SHADOW E&M-EST. PATIENT-LVL III: ICD-10-PCS | Mod: PBBFAC,,, | Performed by: INTERNAL MEDICINE

## 2022-10-26 PROCEDURE — 99999 PR PBB SHADOW E&M-EST. PATIENT-LVL III: CPT | Mod: PBBFAC,,, | Performed by: INTERNAL MEDICINE

## 2022-10-26 PROCEDURE — 3078F PR MOST RECENT DIASTOLIC BLOOD PRESSURE < 80 MM HG: ICD-10-PCS | Mod: CPTII,S$GLB,, | Performed by: INTERNAL MEDICINE

## 2022-10-26 PROCEDURE — 1159F MED LIST DOCD IN RCRD: CPT | Mod: CPTII,S$GLB,, | Performed by: INTERNAL MEDICINE

## 2022-10-26 PROCEDURE — 4010F ACE/ARB THERAPY RXD/TAKEN: CPT | Mod: CPTII,S$GLB,, | Performed by: INTERNAL MEDICINE

## 2022-10-26 PROCEDURE — 3077F SYST BP >= 140 MM HG: CPT | Mod: CPTII,S$GLB,, | Performed by: INTERNAL MEDICINE

## 2022-10-26 PROCEDURE — 99214 OFFICE O/P EST MOD 30 MIN: CPT | Mod: S$GLB,,, | Performed by: INTERNAL MEDICINE

## 2022-10-26 ASSESSMENT — ROUTINE ASSESSMENT OF PATIENT INDEX DATA (RAPID3)
PATIENT GLOBAL ASSESSMENT SCORE: 2
TOTAL RAPID3 SCORE: 2.67
PSYCHOLOGICAL DISTRESS SCORE: 1.1
FATIGUE SCORE: 1.1
MDHAQ FUNCTION SCORE: 0.9
PAIN SCORE: 3

## 2022-10-26 NOTE — PROGRESS NOTES
RHEUMATOLOGY CLINIC FOLLOW UP VISIT      Chief complaints:- management of PsA       HPI:-  Oanh Bliss a 52 y.o. pleasant female with PMH of PsA ( on Cosentyx) and gout comes in for an initial visit with above chief complaints.   Left middle PIP joint with stiffness and swelling slightly improved this visit.  Present over 1 year  stiffness and +pain. Persists despite therapy.   Rheumatological history  Patient was diagnosed with PsA in   Was previously on Cosentyx was discontinued it since quarantine due to COVID concerns.  Failed Enbrel, Humira, MTX (tolerated but ineffective) .   Previously following Dr. Zack West (rheumatologist, [place])  Initial was gout as youth, then Ps, and wandering oligoarticular polyarthritis.     Cosentyx was discontinued around 2020 due to COVID concern.  She has been back on Cosentyx since approx 2021   Restart 2020 cosentyx:   Ibuprofen 800mg PRN.      Fhx:  No psoriasis, thyroid disease, or IBD.      Tobacco (denies), EtOH (social), recreational/illicit drugs (social)     Occupation: Admin     Denies Hx of clots/miscarriages -       ROS: Denies any mouth ulcers, Raynaud's,  photosensitivity, unintentional weight loss, vision changes, SOB, CP, joint swelling, arthralgia, myalgia, urinary/bowel symptoms, N/V, fever/chills, Sicca symptoms, recent travels/sick contacts, depression, insomnia, hair loss    Diffused psoriasis - all over.  Currently on OTC cortisone cream for the itch. Bilateral ankle and L 3rd digit joint swell.  Diffused arthralgia.   Hyperpigmentation forearms not new but present for years.   Interval History     Patient had only one dose (300mg) Cosentyx since the last visit. Significant improvement of arthralgia and rash.  Patient is able to move around with mild pain.  Rash resolving - still putting on steroid cream.      Tolerating medication.  Denies any side effects.       Review of Systems   Constitutional:  Negative for  chills, diaphoresis, fever, malaise/fatigue and weight loss.   HENT:  Negative for congestion, ear discharge, ear pain, hearing loss, nosebleeds, sinus pain and tinnitus.    Eyes:  Negative for photophobia, pain, discharge and redness.   Respiratory:  Negative for cough, hemoptysis, sputum production, shortness of breath, wheezing and stridor.    Cardiovascular:  Negative for chest pain, palpitations, orthopnea, claudication, leg swelling and PND.   Gastrointestinal:  Negative for abdominal pain, constipation, diarrhea, heartburn, nausea and vomiting.   Genitourinary:  Negative for dysuria, frequency, hematuria and urgency.   Musculoskeletal:  Negative for back pain, joint pain, myalgias and neck pain.   Skin:  Positive for itching and rash.   Neurological:  Negative for dizziness, tingling, tremors, weakness and headaches.   Endo/Heme/Allergies:  Does not bruise/bleed easily.   Psychiatric/Behavioral:  Negative for depression, hallucinations and suicidal ideas. The patient is not nervous/anxious and does not have insomnia.      Past Medical History:   Diagnosis Date    Arthritis     Back pain     Degenerative disc disease     LIZBETH (generalized anxiety disorder)     Gout flare     High triglycerides     History of gout     Hyperlipidemia     Hypertension     Joint pain     Obese     Pain of left calf     Psoriasis     Swelling of lower extremity        Past Surgical History:   Procedure Laterality Date    COLONOSCOPY N/A 7/29/2022    Procedure: COLONOSCOPY;  Surgeon: Francy Watson MD;  Location: [facility];  Service: Endoscopy;  Laterality: N/A;    ESOPHAGOGASTRODUODENOSCOPY N/A 7/29/2022    Procedure: EGD (ESOPHAGOGASTRODUODENOSCOPY);  Surgeon: Francy Watson MD;  Location: [facility];  Service: Endoscopy;  Laterality: N/A;    NECK SURGERY          Social History     Tobacco Use    Smoking status: Never    Smokeless tobacco: Never   Substance Use Topics    Alcohol use: Yes    Drug use: No       Family History  "  Problem Relation Age of Onset    Diabetes Father 70    Heart disease Father     Hyperlipidemia Father     Hypertension Father     ALS Father     Cancer Maternal Aunt     Cancer Paternal Uncle     Diabetes Maternal Grandmother     Diabetes Paternal Grandmother     Breast cancer Mother 74    Melanoma Neg Hx     Psoriasis Neg Hx     Lupus Neg Hx     Eczema Neg Hx        Review of patient's allergies indicates:   Allergen Reactions    Pistachio nut        Vitals:    10/26/22 1457   BP: (!) 145/78   Pulse: 92   Weight: 114 kg (251 lb 3.4 oz)   Height: 5' 2" (1.575 m)   PainSc:   1   PainLoc: Back       Physical Exam  Constitutional:       Comments: Obese    HENT:      Head: Normocephalic and atraumatic.   Eyes:      Conjunctiva/sclera: Conjunctivae normal.      Pupils: Pupils are equal, round, and reactive to light.   Cardiovascular:      Rate and Rhythm: Normal rate and regular rhythm.      Heart sounds: Normal heart sounds.   Pulmonary:      Effort: Pulmonary effort is normal.      Breath sounds: Normal breath sounds.   Abdominal:      General: Bowel sounds are normal.      Palpations: Abdomen is soft.   Musculoskeletal:         General: Normal range of motion.      Cervical back: Normal range of motion and neck supple.      Comments: Bilateral ankle synovitis - improvement   L 3rd digit dactylitis      Skin:     General: Skin is warm and dry.      Comments: Diffused large psoriatic plaques in bilateral UE/LE, scalp, chest, back, ears - improving from last time      Neurological:      Mental Status: She is alert and oriented to person, place, and time.      Gait: Gait is intact.   Psychiatric:         Mood and Affect: Mood and affect normal.         Cognition and Memory: Memory normal.         Judgment: Judgment normal.             Medication List with Changes/Refills   Current Medications    ALLOPURINOL (ZYLOPRIM) 300 MG TABLET    Take 1 tablet (300 mg total) by mouth once daily.    COSENTYX PEN, 2 PENS, 150 MG/ML " PNIJ    Inject 2 pens (300 mg) into the skin every 30 days.    EZETIMIBE (ZETIA) 10 MG TABLET    Take 1 tablet (10 mg total) by mouth once daily.    FLUOXETINE 20 MG TABLET    Take 1 tablet (20 mg total) by mouth once daily.    FOLIC ACID (FOLVITE) 1 MG TABLET    Take 1 tablet (1 mg total) by mouth Daily.    LISINOPRIL-HYDROCHLOROTHIAZIDE (PRINZIDE,ZESTORETIC) 20-12.5 MG PER TABLET    Take 1 tablet by mouth once daily.    ROSUVASTATIN (CRESTOR) 20 MG TABLET    Take 1 tablet (20 mg total) by mouth once daily.       Assessment/Plans:-  52 y.o. pleasant female with PMH of PsA (previously on Cosentyx) and gout comes in for f/u for management of PsA.    Patient previously on Cosentyx but discontinued since March/April due to COVID scare is coming in today to establish care and experiencing PsA flare.     Had failed multiple medications in the past - Enbrel, Humira, and MTX.      Plan  - cbc, cmp, sed and c-reactive protein every 3 months. ad failed topical clobetasol in the past  - Ibuprofen 800mg TID PRN for arthralgia - to be taken with food   - Cosentyx 300mg 2 pens -continue     TB 2022 neg  Hepatitis 2022 neg.   LAEs slightly elevated will have her f/u with PCP.   Asking about Ozempic for weight loss - discuss with PCP, but absolutely fine with Cosentyx        Follow up in about 4 months (around 2/26/2023).       30min consultation with greater than 50% spent in counseling, chart review and coordination of care. All questions answered.  Thank you for allowing me to participate in the care of this very pleasant patient.

## 2022-11-17 ENCOUNTER — SPECIALTY PHARMACY (OUTPATIENT)
Dept: PHARMACY | Facility: CLINIC | Age: 52
End: 2022-11-17
Payer: COMMERCIAL

## 2022-11-17 NOTE — TELEPHONE ENCOUNTER
Specialty Pharmacy - Refill Coordination    Specialty Medication Orders Linked to Encounter      Flowsheet Row Most Recent Value   Medication #1 COSENTYX PEN, 2 PENS, 150 mg/mL PnIj (Order#[id number], Rx#[id number])            Refill Questions - Documented Responses      Flowsheet Row Most Recent Value   Patient Availability and HIPAA Verification    Does patient want to proceed with activity? Yes   HIPAA/medical authority confirmed? Yes   Relationship to patient of person spoken to? Self   Refill Screening Questions    Changes to allergies? No   Changes to medications? No   New conditions since last clinic visit? No   Unplanned office visit, urgent care, ED, or hospital admission in the last 4 weeks? No   How does patient/caregiver feel medication is working? Good   Financial problems or insurance changes? No   How many doses of your specialty medications were missed in the last 4 weeks? 0   Would patient like to speak to a pharmacist? No   When does the patient need to receive the medication? 11/25/22   Refill Delivery Questions    How will the patient receive the medication? MEDRx   When does the patient need to receive the medication? 11/25/22   Shipping Address Home   Address in [facility] confirmed and updated if neccessary? Yes   Expected Copay ($) 0   Is the patient able to afford the medication copay? Yes   Payment Method zero copay   Days supply of Refill 30   Supplies needed? No supplies needed   Refill activity completed? Yes   Refill activity plan Refill scheduled   Shipment/Pickup Date: 11/21/22            Current Outpatient Medications   Medication Sig    allopurinoL (ZYLOPRIM) 300 MG tablet Take 1 tablet (300 mg total) by mouth once daily.    COSENTYX PEN, 2 PENS, 150 mg/mL PnIj Inject 2 pens (300 mg) into the skin every 30 days.    ezetimibe (ZETIA) 10 mg tablet Take 1 tablet (10 mg total) by mouth once daily.    FLUoxetine 20 MG tablet Take 1 tablet (20 mg total) by mouth once daily.     folic acid (FOLVITE) 1 MG tablet Take 1 tablet (1 mg total) by mouth Daily.    lisinopriL-hydrochlorothiazide (PRINZIDE,ZESTORETIC) 20-12.5 mg per tablet Take 1 tablet by mouth once daily.    rosuvastatin (CRESTOR) 20 MG tablet Take 1 tablet (20 mg total) by mouth once daily.   Last reviewed on 10/26/2022  2:59 PM by Diana Valles MA    Review of patient's allergies indicates:   Allergen Reactions    Pistachio nut     Last reviewed on  10/26/2022 2:58 PM by Diana Valles      Tasks added this encounter   12/18/2022 - Refill Call (Auto Added)   Tasks due within next 3 months   No tasks due.     [facility]  [address] [facility] [address]  Phone: [phone]  Fax: [phone]

## 2022-11-21 ENCOUNTER — CLINICAL SUPPORT (OUTPATIENT)
Dept: OTHER | Facility: CLINIC | Age: 52
End: 2022-11-21
Payer: COMMERCIAL

## 2022-11-21 DIAGNOSIS — Z00.8 ENCOUNTER FOR OTHER GENERAL EXAMINATION: ICD-10-CM

## 2022-11-21 PROCEDURE — 80061 LIPID PANEL: CPT | Mod: QW,S$GLB,, | Performed by: INTERNAL MEDICINE

## 2022-11-21 PROCEDURE — 82947 PR  ASSAY QUANTITATIVE,BLOOD GLUCOSE: ICD-10-PCS | Mod: QW,S$GLB,, | Performed by: INTERNAL MEDICINE

## 2022-11-21 PROCEDURE — 82947 ASSAY GLUCOSE BLOOD QUANT: CPT | Mod: QW,S$GLB,, | Performed by: INTERNAL MEDICINE

## 2022-11-21 PROCEDURE — 80061 PR  LIPID PANEL: ICD-10-PCS | Mod: QW,S$GLB,, | Performed by: INTERNAL MEDICINE

## 2022-11-21 PROCEDURE — 99401 PREV MED CNSL INDIV APPRX 15: CPT | Mod: S$GLB,,, | Performed by: INTERNAL MEDICINE

## 2022-11-21 PROCEDURE — 99401 PR PREVENT COUNSEL,INDIV,15 MIN: ICD-10-PCS | Mod: S$GLB,,, | Performed by: INTERNAL MEDICINE

## 2022-11-22 VITALS
WEIGHT: 249 LBS | BODY MASS INDEX: 45.82 KG/M2 | HEIGHT: 62 IN | DIASTOLIC BLOOD PRESSURE: 70 MMHG | SYSTOLIC BLOOD PRESSURE: 130 MMHG

## 2022-11-22 LAB
GLUCOSE SERPL-MCNC: 102 MG/DL (ref 60–140)
HDLC SERPL-MCNC: 27 MG/DL
POC CHOLESTEROL, TOTAL: 99 MG/DL
TRIGL SERPL-MCNC: 275 MG/DL

## 2022-12-19 ENCOUNTER — SPECIALTY PHARMACY (OUTPATIENT)
Dept: PHARMACY | Facility: CLINIC | Age: 52
End: 2022-12-19
Payer: COMMERCIAL

## 2022-12-19 NOTE — TELEPHONE ENCOUNTER
Specialty Pharmacy - Refill Coordination    Specialty Medication Orders Linked to Encounter      Flowsheet Row Most Recent Value   Medication #1 COSENTYX PEN, 2 PENS, 150 mg/mL PnIj (Order#[id number], Rx#[id number])            Refill Questions - Documented Responses      Flowsheet Row Most Recent Value   Patient Availability and HIPAA Verification    Does patient want to proceed with activity? Yes   HIPAA/medical authority confirmed? Yes   Relationship to patient of person spoken to? Self   Refill Screening Questions    Changes to allergies? No   Changes to medications? No   New conditions since last clinic visit? No   Unplanned office visit, urgent care, ED, or hospital admission in the last 4 weeks? No   How does patient/caregiver feel medication is working? Good   Financial problems or insurance changes? No   How many doses of your specialty medications were missed in the last 4 weeks? 0   Would patient like to speak to a pharmacist? No   When does the patient need to receive the medication? 12/25/22   Refill Delivery Questions    How will the patient receive the medication? MEDRx   When does the patient need to receive the medication? 12/25/22   Shipping Address Home   Address in [facility] confirmed and updated if neccessary? Yes   Expected Copay ($) 0   Is the patient able to afford the medication copay? Yes   Payment Method zero copay   Days supply of Refill 30   Supplies needed? No supplies needed   Refill activity completed? Yes   Refill activity plan Refill scheduled   Shipment/Pickup Date: 12/21/22            Current Outpatient Medications   Medication Sig    allopurinoL (ZYLOPRIM) 300 MG tablet Take 1 tablet (300 mg total) by mouth once daily.    COSENTYX PEN, 2 PENS, 150 mg/mL PnIj Inject 2 pens (300 mg) into the skin every 30 days.    ezetimibe (ZETIA) 10 mg tablet Take 1 tablet (10 mg total) by mouth once daily.    FLUoxetine 20 MG tablet Take 1 tablet (20 mg total) by mouth once daily.     folic acid (FOLVITE) 1 MG tablet Take 1 tablet (1 mg total) by mouth Daily.    lisinopriL-hydrochlorothiazide (PRINZIDE,ZESTORETIC) 20-12.5 mg per tablet Take 1 tablet by mouth once daily.    rosuvastatin (CRESTOR) 20 MG tablet Take 1 tablet (20 mg total) by mouth once daily.   Last reviewed on 10/26/2022  2:59 PM by Diana Valles MA    Review of patient's allergies indicates:   Allergen Reactions    Pistachio nut     Last reviewed on  10/26/2022 2:58 PM by Diana Valles      Tasks added this encounter   1/17/2023 - Refill Call (Auto Added)   Tasks due within next 3 months   3/15/2023 - Clinical - Follow Up Assesement (Annual)     [facility]  [address]  [facility] [address]  Phone: [phone]  Fax: [phone]

## 2023-01-17 ENCOUNTER — SPECIALTY PHARMACY (OUTPATIENT)
Dept: PHARMACY | Facility: CLINIC | Age: 53
End: 2023-01-17
Payer: COMMERCIAL

## 2023-01-17 NOTE — TELEPHONE ENCOUNTER
Specialty Pharmacy - Refill Coordination    Specialty Medication Orders Linked to Encounter      Flowsheet Row Most Recent Value   Medication #1 COSENTYX PEN, 2 PENS, 150 mg/mL PnIj (Order#[id number], Rx#[id number])            Refill Questions - Documented Responses      Flowsheet Row Most Recent Value   Patient Availability and HIPAA Verification    Does patient want to proceed with activity? Yes   HIPAA/medical authority confirmed? Yes   Relationship to patient of person spoken to? Self   Refill Screening Questions    Changes to allergies? No   Changes to medications? No   New conditions since last clinic visit? No   Unplanned office visit, urgent care, ED, or hospital admission in the last 4 weeks? No   How does patient/caregiver feel medication is working? Good   Financial problems or insurance changes? No   How many doses of your specialty medications were missed in the last 4 weeks? 0   Would patient like to speak to a pharmacist? No   When does the patient need to receive the medication? 01/25/23   Refill Delivery Questions    How will the patient receive the medication? MEDRx   When does the patient need to receive the medication? 01/25/23   Shipping Address Home   Address in [facility] confirmed and updated if neccessary? Yes   Expected Copay ($) 0   Is the patient able to afford the medication copay? Yes   Payment Method zero copay   Days supply of Refill 30   Supplies needed? No supplies needed   Refill activity completed? Yes   Refill activity plan Refill scheduled   Shipment/Pickup Date: 01/23/23            Current Outpatient Medications   Medication Sig    allopurinoL (ZYLOPRIM) 300 MG tablet Take 1 tablet (300 mg total) by mouth once daily.    COSENTYX PEN, 2 PENS, 150 mg/mL PnIj Inject 2 pens (300 mg) into the skin every 30 days.    ezetimibe (ZETIA) 10 mg tablet Take 1 tablet (10 mg total) by mouth once daily.    FLUoxetine 20 MG tablet Take 1 tablet (20 mg total) by mouth once daily.     folic acid (FOLVITE) 1 MG tablet Take 1 tablet (1 mg total) by mouth Daily.    lisinopriL-hydrochlorothiazide (PRINZIDE,ZESTORETIC) 20-12.5 mg per tablet Take 1 tablet by mouth once daily.    rosuvastatin (CRESTOR) 20 MG tablet Take 1 tablet (20 mg total) by mouth once daily.   Last reviewed on 10/26/2022  2:59 PM by Diana Valles MA    Review of patient's allergies indicates:   Allergen Reactions    Pistachio nut     Last reviewed on  10/26/2022 2:58 PM by Diana Valles      Tasks added this encounter   2/17/2023 - Refill Call (Auto Added)   Tasks due within next 3 months   3/15/2023 - Clinical - Follow Up Assesement (Annual)     [facility]  [address]  [facility] [address]  Phone: [phone]  Fax: [phone]

## 2023-01-19 ENCOUNTER — TELEPHONE (OUTPATIENT)
Dept: FAMILY MEDICINE | Facility: CLINIC | Age: 53
End: 2023-01-19

## 2023-01-30 ENCOUNTER — TELEPHONE (OUTPATIENT)
Dept: FAMILY MEDICINE | Facility: CLINIC | Age: 53
End: 2023-01-30

## 2023-01-30 NOTE — TELEPHONE ENCOUNTER
Left message to notify patient to complete fasting  lab orders one week prior to visit.  Informed to return call with questions/concerns -DN

## 2023-01-30 NOTE — TELEPHONE ENCOUNTER
----- Message from Oly Coronel MD sent at 9/28/2022  5:36 PM CDT -----  Remind patient to get labs prior to upcoming appointment

## 2023-02-01 ENCOUNTER — PATIENT MESSAGE (OUTPATIENT)
Dept: FAMILY MEDICINE | Facility: CLINIC | Age: 53
End: 2023-02-01

## 2023-02-01 ENCOUNTER — LAB VISIT (OUTPATIENT)
Dept: LAB | Facility: HOSPITAL | Age: 53
End: 2023-02-01
Attending: FAMILY MEDICINE
Payer: COMMERCIAL

## 2023-02-01 DIAGNOSIS — I10 ESSENTIAL HYPERTENSION: ICD-10-CM

## 2023-02-01 DIAGNOSIS — D84.9 IMMUNOSUPPRESSION: ICD-10-CM

## 2023-02-01 LAB
BASOPHILS # BLD AUTO: 0.02 K/UL (ref 0–0.2)
BASOPHILS NFR BLD: 0.4 % (ref 0–1.9)
DIFFERENTIAL METHOD: ABNORMAL
EOSINOPHIL # BLD AUTO: 0.1 K/UL (ref 0–0.5)
EOSINOPHIL NFR BLD: 1.5 % (ref 0–8)
ERYTHROCYTE [DISTWIDTH] IN BLOOD BY AUTOMATED COUNT: 14 % (ref 11.5–14.5)
HCT VFR BLD AUTO: 36.7 % (ref 37–48.5)
HGB BLD-MCNC: 12.1 G/DL (ref 12–16)
IMM GRANULOCYTES # BLD AUTO: 0.02 K/UL (ref 0–0.04)
IMM GRANULOCYTES NFR BLD AUTO: 0.4 % (ref 0–0.5)
LYMPHOCYTES # BLD AUTO: 1.4 K/UL (ref 1–4.8)
LYMPHOCYTES NFR BLD: 25.8 % (ref 18–48)
MCH RBC QN AUTO: 27.7 PG (ref 27–31)
MCHC RBC AUTO-ENTMCNC: 33 G/DL (ref 32–36)
MCV RBC AUTO: 84 FL (ref 82–98)
MONOCYTES # BLD AUTO: 0.3 K/UL (ref 0.3–1)
MONOCYTES NFR BLD: 6.5 % (ref 4–15)
NEUTROPHILS # BLD AUTO: 3.5 K/UL (ref 1.8–7.7)
NEUTROPHILS NFR BLD: 65.4 % (ref 38–73)
NRBC BLD-RTO: 0 /100 WBC
PLATELET # BLD AUTO: 232 K/UL (ref 150–450)
PMV BLD AUTO: 10.6 FL (ref 9.2–12.9)
RBC # BLD AUTO: 4.37 M/UL (ref 4–5.4)
WBC # BLD AUTO: 5.27 K/UL (ref 3.9–12.7)

## 2023-02-01 PROCEDURE — 36415 COLL VENOUS BLD VENIPUNCTURE: CPT | Performed by: FAMILY MEDICINE

## 2023-02-01 PROCEDURE — 85025 COMPLETE CBC W/AUTO DIFF WBC: CPT | Performed by: FAMILY MEDICINE

## 2023-02-02 ENCOUNTER — PATIENT MESSAGE (OUTPATIENT)
Dept: FAMILY MEDICINE | Facility: CLINIC | Age: 53
End: 2023-02-02

## 2023-02-02 ENCOUNTER — LAB VISIT (OUTPATIENT)
Dept: LAB | Facility: HOSPITAL | Age: 53
End: 2023-02-02
Attending: FAMILY MEDICINE
Payer: COMMERCIAL

## 2023-02-02 DIAGNOSIS — Z79.899 ENCOUNTER FOR LONG-TERM (CURRENT) USE OF OTHER MEDICATIONS: ICD-10-CM

## 2023-02-02 DIAGNOSIS — E78.2 MIXED HYPERLIPIDEMIA: ICD-10-CM

## 2023-02-02 DIAGNOSIS — I10 ESSENTIAL HYPERTENSION, MALIGNANT: Primary | ICD-10-CM

## 2023-02-02 LAB
BASOPHILS # BLD AUTO: 0.02 K/UL (ref 0–0.2)
BASOPHILS NFR BLD: 0.4 % (ref 0–1.9)
CHOLEST SERPL-MCNC: 101 MG/DL (ref 120–199)
CHOLEST/HDLC SERPL: 3.4 {RATIO} (ref 2–5)
DIFFERENTIAL METHOD: NORMAL
EOSINOPHIL # BLD AUTO: 0.1 K/UL (ref 0–0.5)
EOSINOPHIL NFR BLD: 1.4 % (ref 0–8)
ERYTHROCYTE [DISTWIDTH] IN BLOOD BY AUTOMATED COUNT: 14 % (ref 11.5–14.5)
HCT VFR BLD AUTO: 37.3 % (ref 37–48.5)
HDLC SERPL-MCNC: 30 MG/DL (ref 40–75)
HDLC SERPL: 29.7 % (ref 20–50)
HGB BLD-MCNC: 12.2 G/DL (ref 12–16)
IMM GRANULOCYTES # BLD AUTO: 0.02 K/UL (ref 0–0.04)
IMM GRANULOCYTES NFR BLD AUTO: 0.4 % (ref 0–0.5)
LDLC SERPL CALC-MCNC: 44.4 MG/DL (ref 63–159)
LYMPHOCYTES # BLD AUTO: 1.4 K/UL (ref 1–4.8)
LYMPHOCYTES NFR BLD: 25.3 % (ref 18–48)
MCH RBC QN AUTO: 27.7 PG (ref 27–31)
MCHC RBC AUTO-ENTMCNC: 32.7 G/DL (ref 32–36)
MCV RBC AUTO: 85 FL (ref 82–98)
MONOCYTES # BLD AUTO: 0.3 K/UL (ref 0.3–1)
MONOCYTES NFR BLD: 5.9 % (ref 4–15)
NEUTROPHILS # BLD AUTO: 3.7 K/UL (ref 1.8–7.7)
NEUTROPHILS NFR BLD: 66.6 % (ref 38–73)
NONHDLC SERPL-MCNC: 71 MG/DL
NRBC BLD-RTO: 0 /100 WBC
PLATELET # BLD AUTO: 234 K/UL (ref 150–450)
PMV BLD AUTO: 10.6 FL (ref 9.2–12.9)
RBC # BLD AUTO: 4.41 M/UL (ref 4–5.4)
TRIGL SERPL-MCNC: 133 MG/DL (ref 30–150)
WBC # BLD AUTO: 5.61 K/UL (ref 3.9–12.7)

## 2023-02-02 PROCEDURE — 36415 COLL VENOUS BLD VENIPUNCTURE: CPT | Performed by: FAMILY MEDICINE

## 2023-02-02 PROCEDURE — 80061 LIPID PANEL: CPT | Performed by: FAMILY MEDICINE

## 2023-02-02 PROCEDURE — 85025 COMPLETE CBC W/AUTO DIFF WBC: CPT | Performed by: FAMILY MEDICINE

## 2023-02-07 ENCOUNTER — OFFICE VISIT (OUTPATIENT)
Dept: FAMILY MEDICINE | Facility: CLINIC | Age: 53
End: 2023-02-07
Payer: COMMERCIAL

## 2023-02-07 VITALS
SYSTOLIC BLOOD PRESSURE: 129 MMHG | OXYGEN SATURATION: 97 % | BODY MASS INDEX: 46.96 KG/M2 | HEIGHT: 62 IN | WEIGHT: 255.19 LBS | DIASTOLIC BLOOD PRESSURE: 72 MMHG | HEART RATE: 88 BPM

## 2023-02-07 DIAGNOSIS — L40.50 PSORIATIC ARTHRITIS: ICD-10-CM

## 2023-02-07 DIAGNOSIS — D84.9 IMMUNOSUPPRESSION: ICD-10-CM

## 2023-02-07 DIAGNOSIS — E66.01 CLASS 3 SEVERE OBESITY DUE TO EXCESS CALORIES WITH SERIOUS COMORBIDITY AND BODY MASS INDEX (BMI) OF 40.0 TO 44.9 IN ADULT: ICD-10-CM

## 2023-02-07 DIAGNOSIS — E78.2 MIXED HYPERLIPIDEMIA: ICD-10-CM

## 2023-02-07 DIAGNOSIS — I47.10 PSVT (PAROXYSMAL SUPRAVENTRICULAR TACHYCARDIA): ICD-10-CM

## 2023-02-07 DIAGNOSIS — L40.9 PSORIASIS: ICD-10-CM

## 2023-02-07 DIAGNOSIS — M10.9 GOUT, UNSPECIFIED CAUSE, UNSPECIFIED CHRONICITY, UNSPECIFIED SITE: ICD-10-CM

## 2023-02-07 DIAGNOSIS — M25.50 ARTHRALGIA, UNSPECIFIED JOINT: ICD-10-CM

## 2023-02-07 DIAGNOSIS — I10 ESSENTIAL HYPERTENSION: Primary | ICD-10-CM

## 2023-02-07 DIAGNOSIS — F41.1 GAD (GENERALIZED ANXIETY DISORDER): ICD-10-CM

## 2023-02-07 PROCEDURE — 3078F DIAST BP <80 MM HG: CPT | Mod: CPTII,S$GLB,, | Performed by: FAMILY MEDICINE

## 2023-02-07 PROCEDURE — 3008F PR BODY MASS INDEX (BMI) DOCUMENTED: ICD-10-PCS | Mod: CPTII,S$GLB,, | Performed by: FAMILY MEDICINE

## 2023-02-07 PROCEDURE — 3078F PR MOST RECENT DIASTOLIC BLOOD PRESSURE < 80 MM HG: ICD-10-PCS | Mod: CPTII,S$GLB,, | Performed by: FAMILY MEDICINE

## 2023-02-07 PROCEDURE — 3074F PR MOST RECENT SYSTOLIC BLOOD PRESSURE < 130 MM HG: ICD-10-PCS | Mod: CPTII,S$GLB,, | Performed by: FAMILY MEDICINE

## 2023-02-07 PROCEDURE — 99214 OFFICE O/P EST MOD 30 MIN: CPT | Mod: S$GLB,,, | Performed by: FAMILY MEDICINE

## 2023-02-07 PROCEDURE — 3074F SYST BP LT 130 MM HG: CPT | Mod: CPTII,S$GLB,, | Performed by: FAMILY MEDICINE

## 2023-02-07 PROCEDURE — 99214 PR OFFICE/OUTPT VISIT, EST, LEVL IV, 30-39 MIN: ICD-10-PCS | Mod: S$GLB,,, | Performed by: FAMILY MEDICINE

## 2023-02-07 PROCEDURE — 3008F BODY MASS INDEX DOCD: CPT | Mod: CPTII,S$GLB,, | Performed by: FAMILY MEDICINE

## 2023-02-07 RX ORDER — FOLIC ACID 1 MG/1
1 TABLET ORAL DAILY
Qty: 90 TABLET | Refills: 3 | Status: SHIPPED | OUTPATIENT
Start: 2023-02-07 | End: 2023-11-13 | Stop reason: SDUPTHER

## 2023-02-07 RX ORDER — ALLOPURINOL 300 MG/1
300 TABLET ORAL DAILY
Qty: 90 TABLET | Refills: 3 | Status: SHIPPED | OUTPATIENT
Start: 2023-02-07 | End: 2023-11-13 | Stop reason: SDUPTHER

## 2023-02-07 RX ORDER — FLUOXETINE 20 MG/1
20 TABLET ORAL DAILY
Qty: 90 TABLET | Refills: 3 | Status: SHIPPED | OUTPATIENT
Start: 2023-02-07 | End: 2023-11-13 | Stop reason: SDUPTHER

## 2023-02-07 RX ORDER — SEMAGLUTIDE 0.25 MG/.5ML
0.25 INJECTION, SOLUTION SUBCUTANEOUS
Qty: 2 ML | Refills: 1 | Status: SHIPPED | OUTPATIENT
Start: 2023-02-07 | End: 2023-12-03

## 2023-02-07 NOTE — PROGRESS NOTES
SUBJECTIVE:    Patient ID: Oanh Marmolejo is a 52 y.o. female.    Chief Complaint: Follow-up (4 mo follow up/ discuss weight loss options/declines flu vaccine//mp)    Patient with hypertension and hyperlipidemia presents for visit.  Blood pressure is well controlled on her current therapy.  For some time now we have been dealing with her elevated lipids.  Triglycerides are specifically increased.  Combinations of medications have been tried and have been unsuccessful.  Patient is now currently on rosuvastatin and Zetia.  Numbers are now phenomena or.  He actually are in a lower level.  HDL still runs low but LDL, triglycerides and cholesterol have all decreased.  She has no complaints of side effects with medications.    Patient continues on Cosentyx for psoriatic arthritis.  No recent flares.  Also has history of gout hyperuricemia and has been well maintained on allopurinol.    Patient has some concerns about weight.  She has unfortunately gained 10 lb since her last visit.  She would like to try some medication options to help her reach her goal.  She hopes that the weight loss would allow her to discontinue her blood pressure and cholesterol medications.        Lab Visit on 02/02/2023   Component Date Value Ref Range Status    Cholesterol 02/02/2023 101 (L)  120 - 199 mg/dL Final    Triglycerides 02/02/2023 133  30 - 150 mg/dL Final    HDL 02/02/2023 30 (L)  40 - 75 mg/dL Final    LDL Cholesterol 02/02/2023 44.4 (L)  63.0 - 159.0 mg/dL Final    HDL/Cholesterol Ratio 02/02/2023 29.7  20.0 - 50.0 % Final    Total Cholesterol/HDL Ratio 02/02/2023 3.4  2.0 - 5.0 Final    Non-HDL Cholesterol 02/02/2023 71  mg/dL Final    WBC 02/02/2023 5.61  3.90 - 12.70 K/uL Final    RBC 02/02/2023 4.41  4.00 - 5.40 M/uL Final    Hemoglobin 02/02/2023 12.2  12.0 - 16.0 g/dL Final    Hematocrit 02/02/2023 37.3  37.0 - 48.5 % Final    MCV 02/02/2023 85  82 - 98 fL Final    MCH 02/02/2023 27.7  27.0 - 31.0 pg Final    MCHC  02/02/2023 32.7  32.0 - 36.0 g/dL Final    RDW 02/02/2023 14.0  11.5 - 14.5 % Final    Platelets 02/02/2023 234  150 - 450 K/uL Final    MPV 02/02/2023 10.6  9.2 - 12.9 fL Final    Immature Granulocytes 02/02/2023 0.4  0.0 - 0.5 % Final    Gran # (ANC) 02/02/2023 3.7  1.8 - 7.7 K/uL Final    Immature Grans (Abs) 02/02/2023 0.02  0.00 - 0.04 K/uL Final    Lymph # 02/02/2023 1.4  1.0 - 4.8 K/uL Final    Mono # 02/02/2023 0.3  0.3 - 1.0 K/uL Final    Eos # 02/02/2023 0.1  0.0 - 0.5 K/uL Final    Baso # 02/02/2023 0.02  0.00 - 0.20 K/uL Final    nRBC 02/02/2023 0  0 /100 WBC Final    Gran % 02/02/2023 66.6  38.0 - 73.0 % Final    Lymph % 02/02/2023 25.3  18.0 - 48.0 % Final    Mono % 02/02/2023 5.9  4.0 - 15.0 % Final    Eosinophil % 02/02/2023 1.4  0.0 - 8.0 % Final    Basophil % 02/02/2023 0.4  0.0 - 1.9 % Final    Differential Method 02/02/2023 Automated   Final   Lab Visit on 02/01/2023   Component Date Value Ref Range Status    WBC 02/01/2023 5.27  3.90 - 12.70 K/uL Final    RBC 02/01/2023 4.37  4.00 - 5.40 M/uL Final    Hemoglobin 02/01/2023 12.1  12.0 - 16.0 g/dL Final    Hematocrit 02/01/2023 36.7 (L)  37.0 - 48.5 % Final    MCV 02/01/2023 84  82 - 98 fL Final    MCH 02/01/2023 27.7  27.0 - 31.0 pg Final    MCHC 02/01/2023 33.0  32.0 - 36.0 g/dL Final    RDW 02/01/2023 14.0  11.5 - 14.5 % Final    Platelets 02/01/2023 232  150 - 450 K/uL Final    MPV 02/01/2023 10.6  9.2 - 12.9 fL Final    Immature Granulocytes 02/01/2023 0.4  0.0 - 0.5 % Final    Gran # (ANC) 02/01/2023 3.5  1.8 - 7.7 K/uL Final    Immature Grans (Abs) 02/01/2023 0.02  0.00 - 0.04 K/uL Final    Lymph # 02/01/2023 1.4  1.0 - 4.8 K/uL Final    Mono # 02/01/2023 0.3  0.3 - 1.0 K/uL Final    Eos # 02/01/2023 0.1  0.0 - 0.5 K/uL Final    Baso # 02/01/2023 0.02  0.00 - 0.20 K/uL Final    nRBC 02/01/2023 0  0 /100 WBC Final    Gran % 02/01/2023 65.4  38.0 - 73.0 % Final    Lymph % 02/01/2023 25.8  18.0 - 48.0 % Final    Mono % 02/01/2023 6.5  4.0 -  15.0 % Final    Eosinophil % 02/01/2023 1.5  0.0 - 8.0 % Final    Basophil % 02/01/2023 0.4  0.0 - 1.9 % Final    Differential Method 02/01/2023 Automated   Final   Clinical Support on 11/21/2022   Component Date Value Ref Range Status    POC Cholesterol, Total 11/21/2022 99  <240 MG/DL Final    POC Cholesterol, HDL 11/21/2022 27 (L)  MG/DL Final    POC Triglycerides 11/21/2022 275 (H)  <160 MG/DL Final    POC Glucose 11/21/2022 102  60 - 140 MG/DL Final   Lab Visit on 10/21/2022   Component Date Value Ref Range Status    WBC 10/21/2022 10.30  3.90 - 12.70 K/uL Final    RBC 10/21/2022 4.39  4.00 - 5.40 M/uL Final    Hemoglobin 10/21/2022 12.7  12.0 - 16.0 g/dL Final    Hematocrit 10/21/2022 38.0  37.0 - 48.5 % Final    MCV 10/21/2022 87  82 - 98 fL Final    MCH 10/21/2022 28.9  27.0 - 31.0 pg Final    MCHC 10/21/2022 33.4  32.0 - 36.0 g/dL Final    RDW 10/21/2022 14.4  11.5 - 14.5 % Final    Platelets 10/21/2022 318  150 - 450 K/uL Final    MPV 10/21/2022 10.2  9.2 - 12.9 fL Final    Immature Granulocytes 10/21/2022 1.4 (H)  0.0 - 0.5 % Final    Gran # (ANC) 10/21/2022 7.5  1.8 - 7.7 K/uL Final    Immature Grans (Abs) 10/21/2022 0.14 (H)  0.00 - 0.04 K/uL Final    Lymph # 10/21/2022 2.0  1.0 - 4.8 K/uL Final    Mono # 10/21/2022 0.6  0.3 - 1.0 K/uL Final    Eos # 10/21/2022 0.0  0.0 - 0.5 K/uL Final    Baso # 10/21/2022 0.02  0.00 - 0.20 K/uL Final    nRBC 10/21/2022 0  0 /100 WBC Final    Gran % 10/21/2022 72.6  38.0 - 73.0 % Final    Lymph % 10/21/2022 19.7  18.0 - 48.0 % Final    Mono % 10/21/2022 5.7  4.0 - 15.0 % Final    Eosinophil % 10/21/2022 0.4  0.0 - 8.0 % Final    Basophil % 10/21/2022 0.2  0.0 - 1.9 % Final    Differential Method 10/21/2022 Automated   Final    Sodium 10/21/2022 140  136 - 145 mmol/L Final    Potassium 10/21/2022 4.3  3.5 - 5.1 mmol/L Final    Chloride 10/21/2022 102  95 - 110 mmol/L Final    CO2 10/21/2022 26  23 - 29 mmol/L Final    Glucose 10/21/2022 74  70 - 110 mg/dL Final    BUN  10/21/2022 19  6 - 20 mg/dL Final    Creatinine 10/21/2022 0.8  0.5 - 1.4 mg/dL Final    Calcium 10/21/2022 9.8  8.7 - 10.5 mg/dL Final    Total Protein 10/21/2022 7.7  6.0 - 8.4 g/dL Final    Albumin 10/21/2022 4.2  3.5 - 5.2 g/dL Final    Total Bilirubin 10/21/2022 0.7  0.1 - 1.0 mg/dL Final    Alkaline Phosphatase 10/21/2022 81  55 - 135 U/L Final    AST 10/21/2022 52 (H)  10 - 40 U/L Final    ALT 10/21/2022 62 (H)  10 - 44 U/L Final    Anion Gap 10/21/2022 12  8 - 16 mmol/L Final    eGFR 10/21/2022 >60  >60 mL/min/1.73 m^2 Final    Sed Rate 10/21/2022 18  0 - 20 mm/Hr Final    CRP 10/21/2022 1.8  0.0 - 8.2 mg/L Final    Hepatitis B Surface Ag 10/21/2022 Non-reactive  Non-reactive Final    Hep B C IgM 10/21/2022 Non-reactive  Non-reactive Final    Hep A IgM 10/21/2022 Non-reactive  Non-reactive Final    Hepatitis C Ab 10/21/2022 Non-reactive  Non-reactive Final   Lab Visit on 10/21/2022   Component Date Value Ref Range Status    NIL 10/21/2022 0.15432  IU/mL Final    TB1 - Nil 10/21/2022 0.007  IU/mL Final    TB2 - Nil 10/21/2022 0.000  IU/mL Final    Mitogen - Nil 10/21/2022 1.290  IU/mL Final    TB Gold Plus 10/21/2022 Negative  Negative Final   Lab Visit on 08/13/2022   Component Date Value Ref Range Status    Cholesterol 08/13/2022 218 (H)  120 - 199 mg/dL Final    Triglycerides 08/13/2022 212 (H)  30 - 150 mg/dL Final    HDL 08/13/2022 29 (L)  40 - 75 mg/dL Final    LDL Cholesterol 08/13/2022 146.6  63.0 - 159.0 mg/dL Final    HDL/Cholesterol Ratio 08/13/2022 13.3 (L)  20.0 - 50.0 % Final    Total Cholesterol/HDL Ratio 08/13/2022 7.5 (H)  2.0 - 5.0 Final    Non-HDL Cholesterol 08/13/2022 189  mg/dL Final    Uric Acid 08/13/2022 5.3  2.4 - 5.7 mg/dL Final        Past Medical History:   Diagnosis Date    Arthritis     Back pain     Degenerative disc disease     LIZBETH (generalized anxiety disorder)     Gout flare     High triglycerides     History of gout     Hyperlipidemia     Hypertension     Joint pain      Obese     Pain of left calf     Psoriasis     Swelling of lower extremity      Past Surgical History:   Procedure Laterality Date    COLONOSCOPY N/A 7/29/2022    Procedure: COLONOSCOPY;  Surgeon: Francy Watson MD;  Location: [facility];  Service: Endoscopy;  Laterality: N/A;    ESOPHAGOGASTRODUODENOSCOPY N/A 7/29/2022    Procedure: EGD (ESOPHAGOGASTRODUODENOSCOPY);  Surgeon: Francy Watson MD;  Location: [facility] ENDO;  Service: Endoscopy;  Laterality: N/A;    NECK SURGERY       Family History   Problem Relation Age of Onset    Diabetes Father 70    Heart disease Father     Hyperlipidemia Father     Hypertension Father     ALS Father     Cancer Maternal Aunt     Cancer Paternal Uncle     Diabetes Maternal Grandmother     Diabetes Paternal Grandmother     Breast cancer Mother 74    Melanoma Neg Hx     Psoriasis Neg Hx     Lupus Neg Hx     Eczema Neg Hx        Marital Status:   Alcohol History:  reports current alcohol use.  Tobacco History:  reports that she has never smoked. She has never used smokeless tobacco.  Drug History:  reports no history of drug use.    Review of patient's allergies indicates:   Allergen Reactions    Pistachio nut        Current Outpatient Medications:     COSENTYX PEN, 2 PENS, 150 mg/mL PnIj, Inject 2 pens (300 mg) into the skin every 30 days., Disp: 2 mL, Rfl: 5    ezetimibe (ZETIA) 10 mg tablet, Take 1 tablet (10 mg total) by mouth once daily., Disp: 90 tablet, Rfl: 3    lisinopriL-hydrochlorothiazide (PRINZIDE,ZESTORETIC) 20-12.5 mg per tablet, Take 1 tablet by mouth once daily., Disp: 90 tablet, Rfl: 3    rosuvastatin (CRESTOR) 20 MG tablet, Take 1 tablet (20 mg total) by mouth once daily., Disp: 90 tablet, Rfl: 3    allopurinoL (ZYLOPRIM) 300 MG tablet, Take 1 tablet (300 mg total) by mouth once daily., Disp: 90 tablet, Rfl: 3    FLUoxetine 20 MG tablet, Take 1 tablet (20 mg total) by mouth once daily., Disp: 90 tablet, Rfl: 3    folic acid (FOLVITE) 1 MG tablet, Take 1  "tablet (1 mg total) by mouth Daily., Disp: 90 tablet, Rfl: 3    semaglutide, weight loss, (WEGOVY) 0.25 mg/0.5 mL PnIj, Inject 0.25 mg into the skin every 7 days., Disp: 2 mL, Rfl: 1    Review of Systems   Constitutional:  Positive for fatigue. Negative for activity change and unexpected weight change.   HENT:  Negative for hearing loss, postnasal drip, sinus pressure, sore throat and voice change.    Eyes:  Negative for photophobia and visual disturbance.   Respiratory:  Negative for cough, shortness of breath and wheezing.    Cardiovascular:  Negative for chest pain and palpitations.   Gastrointestinal:  Negative for constipation, diarrhea and nausea.   Genitourinary:  Negative for difficulty urinating, frequency, hematuria and urgency.   Musculoskeletal:  Positive for arthralgias. Negative for back pain.   Skin:  Negative for rash.   Neurological:  Negative for weakness, light-headedness and headaches.   Hematological:  Negative for adenopathy. Does not bruise/bleed easily.   Psychiatric/Behavioral:  Positive for agitation and sleep disturbance.         Objective:      Vitals:    02/07/23 1551   BP: 129/72   Pulse: 88   SpO2: 97%   Weight: 115.8 kg (255 lb 3.2 oz)   Height: 5' 2" (1.575 m)     Physical Exam  Constitutional:       Appearance: Normal appearance. She is obese.   HENT:      Head: Normocephalic and atraumatic.      Mouth/Throat:      Mouth: Mucous membranes are moist.   Eyes:      Conjunctiva/sclera: Conjunctivae normal.   Pulmonary:      Effort: Pulmonary effort is normal.   Neurological:      General: No focal deficit present.      Mental Status: She is alert and oriented to person, place, and time.   Psychiatric:         Mood and Affect: Mood normal.         Behavior: Behavior normal.         Assessment:       1. Essential hypertension    2. Mixed hyperlipidemia    3. Gout, unspecified cause, unspecified chronicity, unspecified site    4. Psoriatic arthritis    5. Arthralgia, unspecified joint  "   6. Psoriasis    7. Class 3 severe obesity due to excess calories with serious comorbidity and body mass index (BMI) of 40.0 to 44.9 in adult    8. Immunosuppression    9. PSVT (paroxysmal supraventricular tachycardia)    10. LIZBETH (generalized anxiety disorder)           Plan:       Essential hypertension  -     folic acid (FOLVITE) 1 MG tablet; Take 1 tablet (1 mg total) by mouth Daily.  Dispense: 90 tablet; Refill: 3  -     semaglutide, weight loss, (WEGOVY) 0.25 mg/0.5 mL PnIj; Inject 0.25 mg into the skin every 7 days.  Dispense: 2 mL; Refill: 1    Mixed hyperlipidemia    Gout, unspecified cause, unspecified chronicity, unspecified site  -     allopurinoL (ZYLOPRIM) 300 MG tablet; Take 1 tablet (300 mg total) by mouth once daily.  Dispense: 90 tablet; Refill: 3    Psoriatic arthritis    Arthralgia, unspecified joint    Psoriasis    Class 3 severe obesity due to excess calories with serious comorbidity and body mass index (BMI) of 40.0 to 44.9 in adult  -     semaglutide, weight loss, (WEGOVY) 0.25 mg/0.5 mL PnIj; Inject 0.25 mg into the skin every 7 days.  Dispense: 2 mL; Refill: 1    Immunosuppression    PSVT (paroxysmal supraventricular tachycardia)    LIZBETH (generalized anxiety disorder)  -     FLUoxetine 20 MG tablet; Take 1 tablet (20 mg total) by mouth once daily.  Dispense: 90 tablet; Refill: 3      Follow up in about 6 months (around 8/7/2023) for Annual Physical.

## 2023-02-08 ENCOUNTER — PATIENT MESSAGE (OUTPATIENT)
Dept: FAMILY MEDICINE | Facility: CLINIC | Age: 53
End: 2023-02-08

## 2023-02-09 ENCOUNTER — TELEPHONE (OUTPATIENT)
Dept: FAMILY MEDICINE | Facility: CLINIC | Age: 53
End: 2023-02-09

## 2023-02-09 NOTE — TELEPHONE ENCOUNTER
----- Message from Ruba Irene MA sent at 2/9/2023  8:56 AM CST -----    ----- Message -----  From: Fay Payne MA  Sent: 2/9/2023   7:01 AM CST  To: Oly Cooper

## 2023-02-14 ENCOUNTER — TELEPHONE (OUTPATIENT)
Dept: FAMILY MEDICINE | Facility: CLINIC | Age: 53
End: 2023-02-14

## 2023-02-14 NOTE — TELEPHONE ENCOUNTER
----- Message from Ruba Irene MA sent at 2/13/2023  2:56 PM CST -----    ----- Message -----  From: Fay Payne MA  Sent: 2/13/2023   1:10 PM CST  To: Oly Cooper

## 2023-02-17 ENCOUNTER — SPECIALTY PHARMACY (OUTPATIENT)
Dept: PHARMACY | Facility: CLINIC | Age: 53
End: 2023-02-17
Payer: COMMERCIAL

## 2023-02-17 NOTE — TELEPHONE ENCOUNTER
Specialty Pharmacy - Refill Coordination    Specialty Medication Orders Linked to Encounter      Flowsheet Row Most Recent Value   Medication #1 COSENTYX PEN, 2 PENS, 150 mg/mL PnIj (Order#[id number], Rx#[id number])            Refill Questions - Documented Responses      Flowsheet Row Most Recent Value   Patient Availability and HIPAA Verification    Does patient want to proceed with activity? Yes   HIPAA/medical authority confirmed? Yes   Relationship to patient of person spoken to? Self   Refill Screening Questions    Changes to allergies? No   Changes to medications? No   New conditions since last clinic visit? No   Unplanned office visit, urgent care, ED, or hospital admission in the last 4 weeks? No   How does patient/caregiver feel medication is working? Good   Financial problems or insurance changes? No   How many doses of your specialty medications were missed in the last 4 weeks? 0   Would patient like to speak to a pharmacist? No   When does the patient need to receive the medication? 02/25/23   Refill Delivery Questions    How will the patient receive the medication? MEDRx   When does the patient need to receive the medication? 02/25/23   Shipping Address Home   Address in [facility] confirmed and updated if neccessary? Yes   Expected Copay ($) 0   Is the patient able to afford the medication copay? Yes   Payment Method zero copay   Days supply of Refill 30   Supplies needed? No supplies needed   Refill activity completed? Yes   Refill activity plan Refill scheduled   Shipment/Pickup Date: 02/22/23            Current Outpatient Medications   Medication Sig    allopurinoL (ZYLOPRIM) 300 MG tablet Take 1 tablet (300 mg total) by mouth once daily.    COSENTYX PEN, 2 PENS, 150 mg/mL PnIj Inject 2 pens (300 mg) into the skin every 30 days.    ezetimibe (ZETIA) 10 mg tablet Take 1 tablet (10 mg total) by mouth once daily.    FLUoxetine 20 MG tablet Take 1 tablet (20 mg total) by mouth once daily.     folic acid (FOLVITE) 1 MG tablet Take 1 tablet (1 mg total) by mouth Daily.    lisinopriL-hydrochlorothiazide (PRINZIDE,ZESTORETIC) 20-12.5 mg per tablet Take 1 tablet by mouth once daily.    rosuvastatin (CRESTOR) 20 MG tablet Take 1 tablet (20 mg total) by mouth once daily.    semaglutide, weight loss, (WEGOVY) 0.25 mg/0.5 mL PnIj Inject 0.25 mg into the skin every 7 days.   Last reviewed on 2/7/2023  4:20 PM by Oly Coronel MD    Review of patient's allergies indicates:   Allergen Reactions    Pistachio nut     Last reviewed on  2/7/2023 3:53 PM by Debbie Broderick      Tasks added this encounter   3/20/2023 - Refill Call (Auto Added)   Tasks due within next 3 months   3/15/2023 - Clinical - Follow Up Assesement (Annual)     [facility]  [facility] [address]  [facility] [address]  Phone: [phone]  Fax: [phone]

## 2023-02-20 ENCOUNTER — PATIENT MESSAGE (OUTPATIENT)
Dept: FAMILY MEDICINE | Facility: CLINIC | Age: 53
End: 2023-02-20

## 2023-03-01 ENCOUNTER — PATIENT MESSAGE (OUTPATIENT)
Dept: RHEUMATOLOGY | Facility: CLINIC | Age: 53
End: 2023-03-01
Payer: COMMERCIAL

## 2023-03-20 ENCOUNTER — SPECIALTY PHARMACY (OUTPATIENT)
Dept: PHARMACY | Facility: CLINIC | Age: 53
End: 2023-03-20
Payer: COMMERCIAL

## 2023-03-20 DIAGNOSIS — M25.50 ARTHRALGIA, UNSPECIFIED JOINT: ICD-10-CM

## 2023-03-20 DIAGNOSIS — L40.50 PSORIATIC ARTHRITIS: ICD-10-CM

## 2023-03-20 DIAGNOSIS — I10 ESSENTIAL HYPERTENSION: ICD-10-CM

## 2023-03-20 DIAGNOSIS — L40.9 PSORIASIS: ICD-10-CM

## 2023-03-20 NOTE — TELEPHONE ENCOUNTER
Specialty Pharmacy - Clinical Reassessment    Specialty Medication Orders Linked to Encounter      Flowsheet Row Most Recent Value   Medication #1 COSENTYX PEN, 2 PENS, 150 mg/mL PnIj (Order#[id number], Rx#[id number])          Patient Diagnosis   L40.9 - Psoriasis  L40.50 - Psoriatic arthritis    Oanh Marmolejo is a 52 y.o. female, who is followed by the specialty pharmacy service for management and education of her Cosentyx.  She has been on therapy with Cosentyx for over 2 years.  I have reviewed her electronic medical record and current medication list and determined that specialty medication adjustment Is not needed at this time.    Patient has not experienced adverse events.  She Is adherent reporting 0 missed doses since last review.  Adherence has been encouraged with the following mechanism(s): proactive refill calls.  She is meeting goals of therapy and will continue treatment.        2/17/2023 1/17/2023 12/19/2022 11/17/2022 10/18/2022 9/22/2022 8/18/2022   Follow Up Review   # of missed doses 0 0 0 0 0 0 0   New Medications? No No No No Yes    No No No   New Conditions? No No No No Yes    No No No   New Allergies? No No No No No    No No No   Med Effective? Good Good Good Good Excellent Good Very good   Urgent Care? No No No No Yes    No No No   Requested Pharmacist? No No No No No No No       Multiple values from one day are sorted in reverse-chronological order         Therapy is appropriate to continue.    Therapy is effective: Yes  On scale of 1 to 10, how does patient rank quality of life? (10 - Best): Unable to Assess  Recommendations: none at this time.  Review Method: Chart Review    Tasks added this encounter   12/20/2023 - Clinical - Follow Up Assesement (Annual)   Tasks due within next 3 months   3/20/2023 - Refill Call (Auto Added)     Alfa Beck, PharmD  [facility]  [address] [place] [address]  Phone: [phone]  Fax: [phone]

## 2023-03-21 NOTE — TELEPHONE ENCOUNTER
Patient to have julio 4 piror to my visit not sure if she has these orders set for external or if she can just schedule for [facility] facility.     Dr. Payne

## 2023-03-22 RX ORDER — SECUKINUMAB 150 MG/ML
300 INJECTION SUBCUTANEOUS
Qty: 2 ML | Refills: 5 | Status: SHIPPED | OUTPATIENT
Start: 2023-03-22 | End: 2023-09-14 | Stop reason: SDUPTHER

## 2023-03-23 ENCOUNTER — SPECIALTY PHARMACY (OUTPATIENT)
Dept: PHARMACY | Facility: CLINIC | Age: 53
End: 2023-03-23
Payer: COMMERCIAL

## 2023-03-23 NOTE — TELEPHONE ENCOUNTER
Specialty Pharmacy - Refill Coordination    Specialty Medication Orders Linked to Encounter      Flowsheet Row Most Recent Value   Medication #1 COSENTYX PEN, 2 PENS, 150 mg/mL PnIj (Order#[id number], Rx#[id number])            Refill Questions - Documented Responses      Flowsheet Row Most Recent Value   Patient Availability and HIPAA Verification    Does patient want to proceed with activity? Yes   HIPAA/medical authority confirmed? Yes   Relationship to patient of person spoken to? Self   Refill Screening Questions    Changes to allergies? No   Changes to medications? No   New conditions since last clinic visit? No   Unplanned office visit, urgent care, ED, or hospital admission in the last 4 weeks? No   How does patient/caregiver feel medication is working? Very good   Financial problems or insurance changes? No   How many doses of your specialty medications were missed in the last 4 weeks? 0   Would patient like to speak to a pharmacist? No   When does the patient need to receive the medication? 03/25/23   Refill Delivery Questions    How will the patient receive the medication? MEDRx   When does the patient need to receive the medication? 03/25/23   Shipping Address Home   Address in [facility] confirmed and updated if neccessary? Yes   Expected Copay ($) 0   Is the patient able to afford the medication copay? Yes   Payment Method zero copay   Days supply of Refill 30   Supplies needed? Alcohol Swabs   Refill activity completed? Yes   Refill activity plan Refill scheduled   Shipment/Pickup Date: 03/24/23            Current Outpatient Medications   Medication Sig    allopurinoL (ZYLOPRIM) 300 MG tablet Take 1 tablet (300 mg total) by mouth once daily.    COSENTYX PEN, 2 PENS, 150 mg/mL PnIj Inject 2 pens (300 mg) into the skin every 30 days.    ezetimibe (ZETIA) 10 mg tablet Take 1 tablet (10 mg total) by mouth once daily.    FLUoxetine 20 MG tablet Take 1 tablet (20 mg total) by mouth once daily.     folic acid (FOLVITE) 1 MG tablet Take 1 tablet (1 mg total) by mouth Daily.    lisinopriL-hydrochlorothiazide (PRINZIDE,ZESTORETIC) 20-12.5 mg per tablet Take 1 tablet by mouth once daily.    rosuvastatin (CRESTOR) 20 MG tablet Take 1 tablet (20 mg total) by mouth once daily.    semaglutide, weight loss, (WEGOVY) 0.25 mg/0.5 mL PnIj Inject 0.25 mg into the skin every 7 days.   Last reviewed on 3/20/2023  3:52 PM by Alfa Beck PharmD    Review of patient's allergies indicates:   Allergen Reactions    Pistachio nut     Last reviewed on  3/20/2023 3:52 PM by Alfa Beck      Tasks added this encounter   4/17/2023 - Refill Call (Auto Added)   Tasks due within next 3 months   No tasks due.     Alfa Beck, PharmD  [facility]  [address] [place] [address]  Phone: [phone]  Fax: [phone]

## 2023-04-08 ENCOUNTER — LAB VISIT (OUTPATIENT)
Dept: LAB | Facility: HOSPITAL | Age: 53
End: 2023-04-08
Attending: INTERNAL MEDICINE
Payer: COMMERCIAL

## 2023-04-08 DIAGNOSIS — D84.9 IMMUNOSUPPRESSION: ICD-10-CM

## 2023-04-08 DIAGNOSIS — L40.50 PSORIATIC ARTHRITIS: ICD-10-CM

## 2023-04-08 LAB
ALBUMIN SERPL BCP-MCNC: 4.3 G/DL (ref 3.5–5.2)
ALP SERPL-CCNC: 81 U/L (ref 55–135)
ALT SERPL W/O P-5'-P-CCNC: 27 U/L (ref 10–44)
ANION GAP SERPL CALC-SCNC: 9 MMOL/L (ref 8–16)
AST SERPL-CCNC: 33 U/L (ref 10–40)
BASOPHILS # BLD AUTO: 0.01 K/UL (ref 0–0.2)
BASOPHILS NFR BLD: 0.2 % (ref 0–1.9)
BILIRUB SERPL-MCNC: 1.3 MG/DL (ref 0.1–1)
BUN SERPL-MCNC: 13 MG/DL (ref 6–20)
CALCIUM SERPL-MCNC: 9.9 MG/DL (ref 8.7–10.5)
CHLORIDE SERPL-SCNC: 105 MMOL/L (ref 95–110)
CO2 SERPL-SCNC: 25 MMOL/L (ref 23–29)
CREAT SERPL-MCNC: 0.9 MG/DL (ref 0.5–1.4)
CRP SERPL-MCNC: 3.8 MG/L (ref 0–8.2)
DIFFERENTIAL METHOD: ABNORMAL
EOSINOPHIL # BLD AUTO: 0.1 K/UL (ref 0–0.5)
EOSINOPHIL NFR BLD: 1.3 % (ref 0–8)
ERYTHROCYTE [DISTWIDTH] IN BLOOD BY AUTOMATED COUNT: 14.3 % (ref 11.5–14.5)
ERYTHROCYTE [SEDIMENTATION RATE] IN BLOOD BY WESTERGREN METHOD: 15 MM/HR (ref 0–20)
EST. GFR  (NO RACE VARIABLE): >60 ML/MIN/1.73 M^2
GLUCOSE SERPL-MCNC: 103 MG/DL (ref 70–110)
HCT VFR BLD AUTO: 36.3 % (ref 37–48.5)
HGB BLD-MCNC: 11.9 G/DL (ref 12–16)
IMM GRANULOCYTES # BLD AUTO: 0.02 K/UL (ref 0–0.04)
IMM GRANULOCYTES NFR BLD AUTO: 0.4 % (ref 0–0.5)
LYMPHOCYTES # BLD AUTO: 1.3 K/UL (ref 1–4.8)
LYMPHOCYTES NFR BLD: 23.6 % (ref 18–48)
MCH RBC QN AUTO: 27.5 PG (ref 27–31)
MCHC RBC AUTO-ENTMCNC: 32.8 G/DL (ref 32–36)
MCV RBC AUTO: 84 FL (ref 82–98)
MONOCYTES # BLD AUTO: 0.4 K/UL (ref 0.3–1)
MONOCYTES NFR BLD: 6.4 % (ref 4–15)
NEUTROPHILS # BLD AUTO: 3.8 K/UL (ref 1.8–7.7)
NEUTROPHILS NFR BLD: 68.1 % (ref 38–73)
NRBC BLD-RTO: 0 /100 WBC
PLATELET # BLD AUTO: 220 K/UL (ref 150–450)
PMV BLD AUTO: 10 FL (ref 9.2–12.9)
POTASSIUM SERPL-SCNC: 4.3 MMOL/L (ref 3.5–5.1)
PROT SERPL-MCNC: 7.4 G/DL (ref 6–8.4)
RBC # BLD AUTO: 4.33 M/UL (ref 4–5.4)
SODIUM SERPL-SCNC: 139 MMOL/L (ref 136–145)
WBC # BLD AUTO: 5.51 K/UL (ref 3.9–12.7)

## 2023-04-08 PROCEDURE — 86140 C-REACTIVE PROTEIN: CPT | Performed by: INTERNAL MEDICINE

## 2023-04-08 PROCEDURE — 85651 RBC SED RATE NONAUTOMATED: CPT | Performed by: INTERNAL MEDICINE

## 2023-04-08 PROCEDURE — 85025 COMPLETE CBC W/AUTO DIFF WBC: CPT | Performed by: INTERNAL MEDICINE

## 2023-04-08 PROCEDURE — 36415 COLL VENOUS BLD VENIPUNCTURE: CPT | Performed by: INTERNAL MEDICINE

## 2023-04-08 PROCEDURE — 80053 COMPREHEN METABOLIC PANEL: CPT | Performed by: INTERNAL MEDICINE

## 2023-04-10 ENCOUNTER — PATIENT MESSAGE (OUTPATIENT)
Dept: ADMINISTRATIVE | Facility: HOSPITAL | Age: 53
End: 2023-04-10
Payer: COMMERCIAL

## 2023-04-10 ENCOUNTER — PATIENT OUTREACH (OUTPATIENT)
Dept: ADMINISTRATIVE | Facility: HOSPITAL | Age: 53
End: 2023-04-10
Payer: COMMERCIAL

## 2023-04-10 NOTE — PROGRESS NOTES
Cervical Cancer Gap Report Review. [facility] updated and reviewed. [facility], [facility] and [facility] reviewed. No new HM items found.    Patient portal message sent regarding overdue HM     Immunizations reviewed.

## 2023-04-11 ENCOUNTER — PATIENT MESSAGE (OUTPATIENT)
Dept: ADMINISTRATIVE | Facility: HOSPITAL | Age: 53
End: 2023-04-11
Payer: COMMERCIAL

## 2023-04-15 ENCOUNTER — PATIENT MESSAGE (OUTPATIENT)
Dept: RHEUMATOLOGY | Facility: CLINIC | Age: 53
End: 2023-04-15
Payer: COMMERCIAL

## 2023-04-17 ENCOUNTER — SPECIALTY PHARMACY (OUTPATIENT)
Dept: PHARMACY | Facility: CLINIC | Age: 53
End: 2023-04-17
Payer: COMMERCIAL

## 2023-04-17 ENCOUNTER — PATIENT MESSAGE (OUTPATIENT)
Dept: RHEUMATOLOGY | Facility: CLINIC | Age: 53
End: 2023-04-17
Payer: COMMERCIAL

## 2023-04-17 NOTE — TELEPHONE ENCOUNTER
Outgoing call regarding cosentyx refill; per pt, she's due to inject on 4/25; informed her that a PA is required, and once approved [facility] will follow up to schedule delivery; [facility]

## 2023-04-18 NOTE — TELEPHONE ENCOUNTER
Specialty Pharmacy - Refill Coordination  Specialty Pharmacy - Medication/Referral Authorization    Specialty Medication Orders Linked to Encounter      Flowsheet Row Most Recent Value   Medication #1 COSENTYX PEN, 2 PENS, 150 mg/mL PnIj (Order#[id number], Rx#[id number])            Refill Questions - Documented Responses      Flowsheet Row Most Recent Value   Patient Availability and HIPAA Verification    Does patient want to proceed with activity? Yes   HIPAA/medical authority confirmed? Yes   Relationship to patient of person spoken to? Self   Refill Screening Questions    Changes to allergies? No   Changes to medications? No   New conditions since last clinic visit? No   Unplanned office visit, urgent care, ED, or hospital admission in the last 4 weeks? No   How does patient/caregiver feel medication is working? Good   Financial problems or insurance changes? No   How many doses of your specialty medications were missed in the last 4 weeks? 0   Would patient like to speak to a pharmacist? No   When does the patient need to receive the medication? 04/25/23   Refill Delivery Questions    How will the patient receive the medication? MEDRx   When does the patient need to receive the medication? 04/25/23   Shipping Address Home   Address in [facility] confirmed and updated if neccessary? Yes   Expected Copay ($) 0   Is the patient able to afford the medication copay? Yes   Payment Method zero copay   Days supply of Refill 28   Supplies needed? No supplies needed   Refill activity completed? Yes   Refill activity plan Refill scheduled   Shipment/Pickup Date: 04/20/23            Current Outpatient Medications   Medication Sig    allopurinoL (ZYLOPRIM) 300 MG tablet Take 1 tablet (300 mg total) by mouth once daily.    COSENTYX PEN, 2 PENS, 150 mg/mL PnIj Inject 2 pens (300 mg) into the skin every 30 days.    ezetimibe (ZETIA) 10 mg tablet Take 1 tablet (10 mg total) by mouth once daily.    FLUoxetine 20 MG tablet  Take 1 tablet (20 mg total) by mouth once daily.    folic acid (FOLVITE) 1 MG tablet Take 1 tablet (1 mg total) by mouth Daily.    lisinopriL-hydrochlorothiazide (PRINZIDE,ZESTORETIC) 20-12.5 mg per tablet Take 1 tablet by mouth once daily.    rosuvastatin (CRESTOR) 20 MG tablet Take 1 tablet (20 mg total) by mouth once daily.    semaglutide, weight loss, (WEGOVY) 0.25 mg/0.5 mL PnIj Inject 0.25 mg into the skin every 7 days.   Last reviewed on 3/20/2023  3:52 PM by Alfa Beck, PharmD    Review of patient's allergies indicates:   Allergen Reactions    Pistachio nut     Last reviewed on  3/20/2023 3:52 PM by Alfa Beck      Tasks added this encounter   4/17/2023 - Referral Authorization  5/16/2023 - Refill Call (Auto Added)   Tasks due within next 3 months   No tasks due.     [facility]  [address] [facility] [address]  Phone: [phone]  Fax: [phone]

## 2023-04-18 NOTE — TELEPHONE ENCOUNTER
Incoming call from patient for status on Cosentyx PA. PA pending. [facility] will be back in touch when determination is received.

## 2023-04-26 ENCOUNTER — OFFICE VISIT (OUTPATIENT)
Dept: RHEUMATOLOGY | Facility: CLINIC | Age: 53
End: 2023-04-26
Payer: COMMERCIAL

## 2023-04-26 VITALS
BODY MASS INDEX: 46.35 KG/M2 | SYSTOLIC BLOOD PRESSURE: 112 MMHG | WEIGHT: 251.88 LBS | HEART RATE: 91 BPM | DIASTOLIC BLOOD PRESSURE: 70 MMHG | HEIGHT: 62 IN

## 2023-04-26 DIAGNOSIS — Z79.899 IMMUNOSUPPRESSION DUE TO DRUG THERAPY: ICD-10-CM

## 2023-04-26 DIAGNOSIS — G56.01 RIGHT CARPAL TUNNEL SYNDROME: ICD-10-CM

## 2023-04-26 DIAGNOSIS — D84.821 IMMUNOSUPPRESSION DUE TO DRUG THERAPY: ICD-10-CM

## 2023-04-26 DIAGNOSIS — Z79.899 HIGH RISK MEDICATIONS (NOT ANTICOAGULANTS) LONG-TERM USE: ICD-10-CM

## 2023-04-26 DIAGNOSIS — L40.50 PSORIATIC ARTHRITIS: Primary | ICD-10-CM

## 2023-04-26 DIAGNOSIS — L40.9 PSORIASIS: ICD-10-CM

## 2023-04-26 PROCEDURE — 99999 PR PBB SHADOW E&M-EST. PATIENT-LVL IV: ICD-10-PCS | Mod: PBBFAC,,, | Performed by: INTERNAL MEDICINE

## 2023-04-26 PROCEDURE — 3078F DIAST BP <80 MM HG: CPT | Mod: CPTII,S$GLB,, | Performed by: INTERNAL MEDICINE

## 2023-04-26 PROCEDURE — 1159F PR MEDICATION LIST DOCUMENTED IN MEDICAL RECORD: ICD-10-PCS | Mod: CPTII,S$GLB,, | Performed by: INTERNAL MEDICINE

## 2023-04-26 PROCEDURE — 3074F SYST BP LT 130 MM HG: CPT | Mod: CPTII,S$GLB,, | Performed by: INTERNAL MEDICINE

## 2023-04-26 PROCEDURE — 4010F PR ACE/ARB THEARPY RXD/TAKEN: ICD-10-PCS | Mod: CPTII,S$GLB,, | Performed by: INTERNAL MEDICINE

## 2023-04-26 PROCEDURE — 3074F PR MOST RECENT SYSTOLIC BLOOD PRESSURE < 130 MM HG: ICD-10-PCS | Mod: CPTII,S$GLB,, | Performed by: INTERNAL MEDICINE

## 2023-04-26 PROCEDURE — 4010F ACE/ARB THERAPY RXD/TAKEN: CPT | Mod: CPTII,S$GLB,, | Performed by: INTERNAL MEDICINE

## 2023-04-26 PROCEDURE — 99999 PR PBB SHADOW E&M-EST. PATIENT-LVL IV: CPT | Mod: PBBFAC,,, | Performed by: INTERNAL MEDICINE

## 2023-04-26 PROCEDURE — 1160F PR REVIEW ALL MEDS BY PRESCRIBER/CLIN PHARMACIST DOCUMENTED: ICD-10-PCS | Mod: CPTII,S$GLB,, | Performed by: INTERNAL MEDICINE

## 2023-04-26 PROCEDURE — 3078F PR MOST RECENT DIASTOLIC BLOOD PRESSURE < 80 MM HG: ICD-10-PCS | Mod: CPTII,S$GLB,, | Performed by: INTERNAL MEDICINE

## 2023-04-26 PROCEDURE — 99215 OFFICE O/P EST HI 40 MIN: CPT | Mod: S$GLB,,, | Performed by: INTERNAL MEDICINE

## 2023-04-26 PROCEDURE — 1160F RVW MEDS BY RX/DR IN RCRD: CPT | Mod: CPTII,S$GLB,, | Performed by: INTERNAL MEDICINE

## 2023-04-26 PROCEDURE — 99215 PR OFFICE/OUTPT VISIT, EST, LEVL V, 40-54 MIN: ICD-10-PCS | Mod: S$GLB,,, | Performed by: INTERNAL MEDICINE

## 2023-04-26 PROCEDURE — 1159F MED LIST DOCD IN RCRD: CPT | Mod: CPTII,S$GLB,, | Performed by: INTERNAL MEDICINE

## 2023-04-26 PROCEDURE — 3008F BODY MASS INDEX DOCD: CPT | Mod: CPTII,S$GLB,, | Performed by: INTERNAL MEDICINE

## 2023-04-26 PROCEDURE — 3008F PR BODY MASS INDEX (BMI) DOCUMENTED: ICD-10-PCS | Mod: CPTII,S$GLB,, | Performed by: INTERNAL MEDICINE

## 2023-04-26 ASSESSMENT — ROUTINE ASSESSMENT OF PATIENT INDEX DATA (RAPID3)
PAIN SCORE: 5
PATIENT GLOBAL ASSESSMENT SCORE: 5
PSYCHOLOGICAL DISTRESS SCORE: 0
MDHAQ FUNCTION SCORE: 1
FATIGUE SCORE: 2.2
TOTAL RAPID3 SCORE: 4.44

## 2023-04-26 NOTE — PROGRESS NOTES
"    RHEUMATOLOGY CLINIC FOLLOW UP VISIT      Chief complaints:- management of PsA       HPI:-  Oanh Bliss a 52 y.o. pleasant female with PMH of PsA ( on Cosentyx) and gout comes in for an initial visit with above chief complaints.   Left middle PIP joint with stiffness and swelling slightly improved this visit.  Present over 1 year  stiffness and +pain. Persists despite therapy.   Rheumatological history  Patient was diagnosed with PsA in   Was previously on Cosentyx was discontinued it since quarantine due to COVID concerns.  Failed Enbrel, Humira, MTX (tolerated but ineffective) .   Previously following Dr. Zack West (rheumatologist, [place])  Initial was gout as youth, then Ps, and wandering oligoarticular polyarthritis.     Cosentyx was discontinued around 2020 due to COVID concern.  She has been back on Cosentyx since approx 2021   Restart 2020 cosentyx:   Ibuprofen 800mg PRN.      Fhx:  No psoriasis, thyroid disease, or IBD.      Tobacco (denies), EtOH (social), recreational/illicit drugs (social)     Occupation: Admin     Denies Hx of clots/miscarriages -       ROS: Denies any mouth ulcers, Raynaud's,  photosensitivity, unintentional weight loss, vision changes, SOB, CP, joint swelling, arthralgia, myalgia, urinary/bowel symptoms, N/V, fever/chills, Sicca symptoms, recent travels/sick contacts, depression, insomnia, hair loss    Diffused psoriasis - all over.  Currently on OTC cortisone cream for the itch. Bilateral ankle and L 3rd digit joint swell.  Diffused arthralgia.   Hyperpigmentation forearms not new but present for years.     Interval History   2023:  Tolerating medication.  Denies any side effects.   Recently power washed with residual bilateral CMC arthritis and right index is numb. Some "electric" sensations with lifting certain items.   She is having lateral hip pain no groin and LBP.             Review of Systems   Constitutional:  Negative for chills, " diaphoresis, fever, malaise/fatigue and weight loss.   HENT:  Negative for congestion, ear discharge, ear pain, hearing loss, nosebleeds, sinus pain and tinnitus.    Eyes:  Negative for photophobia, pain, discharge and redness.   Respiratory:  Negative for cough, hemoptysis, sputum production, shortness of breath, wheezing and stridor.    Cardiovascular:  Negative for chest pain, palpitations, orthopnea, claudication, leg swelling and PND.   Gastrointestinal:  Negative for abdominal pain, constipation, diarrhea, heartburn, nausea and vomiting.   Genitourinary:  Negative for dysuria, frequency, hematuria and urgency.   Musculoskeletal:  Negative for back pain, joint pain, myalgias and neck pain.   Skin:  Positive for itching and rash.   Neurological:  Negative for dizziness, tingling, tremors, weakness and headaches.   Endo/Heme/Allergies:  Does not bruise/bleed easily.   Psychiatric/Behavioral:  Negative for depression, hallucinations and suicidal ideas. The patient is not nervous/anxious and does not have insomnia.      Past Medical History:   Diagnosis Date    Arthritis     Back pain     Degenerative disc disease     LIZBETH (generalized anxiety disorder)     Gout flare     High triglycerides     History of gout     Hyperlipidemia     Hypertension     Joint pain     Obese     Pain of left calf     Psoriasis     Swelling of lower extremity        Past Surgical History:   Procedure Laterality Date    COLONOSCOPY N/A 7/29/2022    Procedure: COLONOSCOPY;  Surgeon: Francy Watson MD;  Location: [facility];  Service: Endoscopy;  Laterality: N/A;    ESOPHAGOGASTRODUODENOSCOPY N/A 7/29/2022    Procedure: EGD (ESOPHAGOGASTRODUODENOSCOPY);  Surgeon: Francy Watson MD;  Location: [facility];  Service: Endoscopy;  Laterality: N/A;    NECK SURGERY          Social History     Tobacco Use    Smoking status: Never    Smokeless tobacco: Never   Substance Use Topics    Alcohol use: Yes    Drug use: No       Family History   Problem  "Relation Age of Onset    Diabetes Father 70    Heart disease Father     Hyperlipidemia Father     Hypertension Father     ALS Father     Cancer Maternal Aunt     Cancer Paternal Uncle     Diabetes Maternal Grandmother     Diabetes Paternal Grandmother     Breast cancer Mother 74    Melanoma Neg Hx     Psoriasis Neg Hx     Lupus Neg Hx     Eczema Neg Hx        Review of patient's allergies indicates:   Allergen Reactions    Pistachio nut        Vitals:    04/26/23 0757   BP: 112/70   Pulse: 91   Weight: 114.3 kg (251 lb 14 oz)   Height: 5' 2" (1.575 m)   PainSc:   4   PainLoc: Back       Physical Exam  Constitutional:       Comments: Obese    HENT:      Head: Normocephalic and atraumatic.   Eyes:      Conjunctiva/sclera: Conjunctivae normal.      Pupils: Pupils are equal, round, and reactive to light.   Cardiovascular:      Rate and Rhythm: Normal rate and regular rhythm.      Heart sounds: Normal heart sounds.   Pulmonary:      Effort: Pulmonary effort is normal.      Breath sounds: Normal breath sounds.   Abdominal:      General: Bowel sounds are normal.      Palpations: Abdomen is soft.   Musculoskeletal:         General: Normal range of motion.      Cervical back: Normal range of motion and neck supple.      Comments: Bilateral ankle synovitis - improvement   L 3rd digit dactylitis      Skin:     General: Skin is warm and dry.      Comments: Diffused large psoriatic plaques in bilateral UE/LE, scalp, chest, back, ears - improving from last time      Neurological:      Mental Status: She is alert and oriented to person, place, and time.      Gait: Gait is intact.   Psychiatric:         Mood and Affect: Mood and affect normal.         Cognition and Memory: Memory normal.         Judgment: Judgment normal.             Medication List with Changes/Refills   Current Medications    ALLOPURINOL (ZYLOPRIM) 300 MG TABLET    Take 1 tablet (300 mg total) by mouth once daily.    COSENTYX PEN, 2 PENS, 150 MG/ML PNIJ    Inject " 2 pens (300 mg) into the skin every 30 days.    EZETIMIBE (ZETIA) 10 MG TABLET    Take 1 tablet (10 mg total) by mouth once daily.    FLUOXETINE 20 MG TABLET    Take 1 tablet (20 mg total) by mouth once daily.    FOLIC ACID (FOLVITE) 1 MG TABLET    Take 1 tablet (1 mg total) by mouth Daily.    LISINOPRIL-HYDROCHLOROTHIAZIDE (PRINZIDE,ZESTORETIC) 20-12.5 MG PER TABLET    Take 1 tablet by mouth once daily.    ROSUVASTATIN (CRESTOR) 20 MG TABLET    Take 1 tablet (20 mg total) by mouth once daily.    SEMAGLUTIDE, WEIGHT LOSS, (WEGOVY) 0.25 MG/0.5 ML PNIJ    Inject 0.25 mg into the skin every 7 days.     Psoriatic arthritis  -     CBC Auto Differential; Standing; Expected date: 04/26/2023  -     Comprehensive Metabolic Panel; Standing; Expected date: 04/26/2023  -     Sedimentation rate; Standing; Expected date: 04/26/2023  -     C-Reactive Protein; Standing; Expected date: 04/26/2023    Psoriasis  -     CBC Auto Differential; Standing; Expected date: 04/26/2023  -     Comprehensive Metabolic Panel; Standing; Expected date: 04/26/2023  -     Sedimentation rate; Standing; Expected date: 04/26/2023  -     C-Reactive Protein; Standing; Expected date: 04/26/2023    Immunosuppression due to drug therapy    High risk medications (not anticoagulants) long-term use    Right carpal tunnel syndrome        Assessment/Plans:-  52 y.o. pleasant female with PMH of PsA (previously on Cosentyx) and gout comes in for f/u for management of PsA.    Patient previously on Cosentyx but discontinued since March/April due to COVID scare is coming in today to establish care and experiencing PsA flare.     Had failed multiple medications in the past - Enbrel, Humira, and MTX.      Plan  - cbc, cmp, sed and c-reactive protein every 3 months. ad failed topical clobetasol in the past  - Ibuprofen 800mg TID PRN for arthralgia - to be taken with food   - Cosentyx 300mg 2 pens -continue   -wrist splint at night for CTS. If not improving will send to PM  and R>     TB 2022 neg  Hepatitis 2022 neg.       No follow-ups on file.       40min consultation with greater than 50% of that time included Preparing to see the patient (review records, tests), Obtaining and/or reviewing separately obtained historical data, Performing a medically appropriate examination and/or evaluation , Ordering medications, tests, and/or procedures, Referring and communicating with other healthcare professionals , Documenting clinical information in the electronic or other health record and Independently interpreting results  (as warranted) & communicating results to the patient/family/caregiver. All questions answered.

## 2023-05-16 ENCOUNTER — SPECIALTY PHARMACY (OUTPATIENT)
Dept: PHARMACY | Facility: CLINIC | Age: 53
End: 2023-05-16
Payer: COMMERCIAL

## 2023-05-16 NOTE — TELEPHONE ENCOUNTER
Specialty Pharmacy - Refill Coordination    Specialty Medication Orders Linked to Encounter      Flowsheet Row Most Recent Value   Medication #1 COSENTYX PEN, 2 PENS, 150 mg/mL PnIj (Order#[id number], Rx#[id number])            Refill Questions - Documented Responses      Flowsheet Row Most Recent Value   Patient Availability and HIPAA Verification    Does patient want to proceed with activity? Yes   HIPAA/medical authority confirmed? Yes   Relationship to patient of person spoken to? Self   Refill Screening Questions    Changes to allergies? No   Changes to medications? No   New conditions since last clinic visit? No   Unplanned office visit, urgent care, ED, or hospital admission in the last 4 weeks? No   How does patient/caregiver feel medication is working? Good   Financial problems or insurance changes? No   How many doses of your specialty medications were missed in the last 4 weeks? 0   Would patient like to speak to a pharmacist? No   When does the patient need to receive the medication? 05/25/23   Refill Delivery Questions    How will the patient receive the medication? MEDRx   When does the patient need to receive the medication? 05/25/23   Shipping Address Home   Address in [facility] confirmed and updated if neccessary? Yes   Expected Copay ($) 0   Is the patient able to afford the medication copay? Yes   Payment Method zero copay   Days supply of Refill 30   Supplies needed? No supplies needed   Refill activity completed? Yes   Refill activity plan Refill scheduled   Shipment/Pickup Date: 05/18/23            Current Outpatient Medications   Medication Sig    allopurinoL (ZYLOPRIM) 300 MG tablet Take 1 tablet (300 mg total) by mouth once daily.    COSENTYX PEN, 2 PENS, 150 mg/mL PnIj Inject 2 pens (300 mg) into the skin every 30 days.    ezetimibe (ZETIA) 10 mg tablet Take 1 tablet (10 mg total) by mouth once daily.    FLUoxetine 20 MG tablet Take 1 tablet (20 mg total) by mouth once daily.     folic acid (FOLVITE) 1 MG tablet Take 1 tablet (1 mg total) by mouth Daily.    lisinopriL-hydrochlorothiazide (PRINZIDE,ZESTORETIC) 20-12.5 mg per tablet Take 1 tablet by mouth once daily.    rosuvastatin (CRESTOR) 20 MG tablet Take 1 tablet (20 mg total) by mouth once daily.    semaglutide, weight loss, (WEGOVY) 0.25 mg/0.5 mL PnIj Inject 0.25 mg into the skin every 7 days.   Last reviewed on 4/26/2023  8:20 AM by Ruth Payne, DO    Review of patient's allergies indicates:   Allergen Reactions    Pistachio nut     Last reviewed on  4/26/2023 8:20 AM by Ruth Payne      Tasks added this encounter   No tasks added.   Tasks due within next 3 months   No tasks due.     Zina Hammond, Patient Care Assistant  [facility]  [address] [place], [place] [address]  [facility] [address]  Phone: [phone]  Fax: [phone]

## 2023-06-01 ENCOUNTER — PATIENT MESSAGE (OUTPATIENT)
Dept: RHEUMATOLOGY | Facility: CLINIC | Age: 53
End: 2023-06-01
Payer: COMMERCIAL

## 2023-06-06 ENCOUNTER — PATIENT MESSAGE (OUTPATIENT)
Dept: RHEUMATOLOGY | Facility: CLINIC | Age: 53
End: 2023-06-06
Payer: COMMERCIAL

## 2023-06-07 DIAGNOSIS — M65.30 ACQUIRED TRIGGER FINGER: Primary | ICD-10-CM

## 2023-06-07 DIAGNOSIS — L40.50 PSORIATIC ARTHRITIS: ICD-10-CM

## 2023-06-12 ENCOUNTER — SPECIALTY PHARMACY (OUTPATIENT)
Dept: PHARMACY | Facility: CLINIC | Age: 53
End: 2023-06-12
Payer: COMMERCIAL

## 2023-06-12 ENCOUNTER — OFFICE VISIT (OUTPATIENT)
Dept: ORTHOPEDICS | Facility: CLINIC | Age: 53
End: 2023-06-12
Payer: COMMERCIAL

## 2023-06-12 VITALS — HEIGHT: 62 IN | WEIGHT: 252 LBS | BODY MASS INDEX: 46.38 KG/M2

## 2023-06-12 DIAGNOSIS — M65.312 TRIGGER FINGER OF LEFT THUMB: Primary | ICD-10-CM

## 2023-06-12 DIAGNOSIS — L40.50 PSORIATIC ARTHRITIS: ICD-10-CM

## 2023-06-12 PROCEDURE — 1160F PR REVIEW ALL MEDS BY PRESCRIBER/CLIN PHARMACIST DOCUMENTED: ICD-10-PCS | Mod: CPTII,S$GLB,, | Performed by: PHYSICIAN ASSISTANT

## 2023-06-12 PROCEDURE — 99999 PR PBB SHADOW E&M-EST. PATIENT-LVL III: CPT | Mod: PBBFAC,,, | Performed by: PHYSICIAN ASSISTANT

## 2023-06-12 PROCEDURE — 99999 PR PBB SHADOW E&M-EST. PATIENT-LVL III: ICD-10-PCS | Mod: PBBFAC,,, | Performed by: PHYSICIAN ASSISTANT

## 2023-06-12 PROCEDURE — 4010F PR ACE/ARB THEARPY RXD/TAKEN: ICD-10-PCS | Mod: CPTII,S$GLB,, | Performed by: PHYSICIAN ASSISTANT

## 2023-06-12 PROCEDURE — 3008F PR BODY MASS INDEX (BMI) DOCUMENTED: ICD-10-PCS | Mod: CPTII,S$GLB,, | Performed by: PHYSICIAN ASSISTANT

## 2023-06-12 PROCEDURE — 1159F PR MEDICATION LIST DOCUMENTED IN MEDICAL RECORD: ICD-10-PCS | Mod: CPTII,S$GLB,, | Performed by: PHYSICIAN ASSISTANT

## 2023-06-12 PROCEDURE — 20550 NJX 1 TENDON SHEATH/LIGAMENT: CPT | Mod: LT,S$GLB,, | Performed by: PHYSICIAN ASSISTANT

## 2023-06-12 PROCEDURE — 99203 OFFICE O/P NEW LOW 30 MIN: CPT | Mod: 25,S$GLB,, | Performed by: PHYSICIAN ASSISTANT

## 2023-06-12 PROCEDURE — 20550 TENDON SHEATH: ICD-10-PCS | Mod: LT,S$GLB,, | Performed by: PHYSICIAN ASSISTANT

## 2023-06-12 PROCEDURE — 3008F BODY MASS INDEX DOCD: CPT | Mod: CPTII,S$GLB,, | Performed by: PHYSICIAN ASSISTANT

## 2023-06-12 PROCEDURE — 4010F ACE/ARB THERAPY RXD/TAKEN: CPT | Mod: CPTII,S$GLB,, | Performed by: PHYSICIAN ASSISTANT

## 2023-06-12 PROCEDURE — 99203 PR OFFICE/OUTPT VISIT, NEW, LEVL III, 30-44 MIN: ICD-10-PCS | Mod: 25,S$GLB,, | Performed by: PHYSICIAN ASSISTANT

## 2023-06-12 PROCEDURE — 1160F RVW MEDS BY RX/DR IN RCRD: CPT | Mod: CPTII,S$GLB,, | Performed by: PHYSICIAN ASSISTANT

## 2023-06-12 PROCEDURE — 1159F MED LIST DOCD IN RCRD: CPT | Mod: CPTII,S$GLB,, | Performed by: PHYSICIAN ASSISTANT

## 2023-06-12 RX ORDER — TRIAMCINOLONE ACETONIDE 40 MG/ML
40 INJECTION, SUSPENSION INTRA-ARTICULAR; INTRAMUSCULAR
Status: DISCONTINUED | OUTPATIENT
Start: 2023-06-12 | End: 2023-06-12 | Stop reason: HOSPADM

## 2023-06-12 RX ADMIN — TRIAMCINOLONE ACETONIDE 40 MG: 40 INJECTION, SUSPENSION INTRA-ARTICULAR; INTRAMUSCULAR at 04:06

## 2023-06-12 NOTE — PROGRESS NOTES
6/12/2023    Chief Complaint:  Chief Complaint   Patient presents with    Left Hand - Pain        HPI:  Oanh Marmolejo is a 52 y.o. female who presents to clinic today for evaluation of her left thumb pain and locking.  States symptoms started approximally 1 month ago.  States pain is located at the base of the left thumb.  States the thumb we will become locked in flexion and she will have to reduce the thumb to extension.  States pain on average is 7/10.  States pain is worse with activity and locking of the thumb.  States pain is better with rest.  Denies any acute injury she can recall.  Denies being a diabetic.  Denies any other complaints at this time.    PMHX:  Past Medical History:   Diagnosis Date    Arthritis     Back pain     Degenerative disc disease     LIZBETH (generalized anxiety disorder)     Gout flare     High triglycerides     History of gout     Hyperlipidemia     Hypertension     Joint pain     Obese     Pain of left calf     Psoriasis     Swelling of lower extremity        PSHX:  Past Surgical History:   Procedure Laterality Date    COLONOSCOPY N/A 7/29/2022    Procedure: COLONOSCOPY;  Surgeon: Francy Watson MD;  Location: [facility];  Service: Endoscopy;  Laterality: N/A;    ESOPHAGOGASTRODUODENOSCOPY N/A 7/29/2022    Procedure: EGD (ESOPHAGOGASTRODUODENOSCOPY);  Surgeon: Francy Watson MD;  Location: [facility];  Service: Endoscopy;  Laterality: N/A;    NECK SURGERY         FMHX:  Family History   Problem Relation Age of Onset    Diabetes Father 70    Heart disease Father     Hyperlipidemia Father     Hypertension Father     ALS Father     Cancer Maternal Aunt     Cancer Paternal Uncle     Diabetes Maternal Grandmother     Diabetes Paternal Grandmother     Breast cancer Mother 74    Melanoma Neg Hx     Psoriasis Neg Hx     Lupus Neg Hx     Eczema Neg Hx        SOCHX:  Social History     Tobacco Use    Smoking status: Never    Smokeless tobacco: Never   Substance Use Topics    Alcohol use:  "Yes       ALLERGIES:  Pistachio nut    CURRENT MEDICATIONS:  Current Outpatient Medications on File Prior to Visit   Medication Sig Dispense Refill    allopurinoL (ZYLOPRIM) 300 MG tablet Take 1 tablet (300 mg total) by mouth once daily. 90 tablet 3    COSENTYX PEN, 2 PENS, 150 mg/mL PnIj Inject 2 pens (300 mg) into the skin every 30 days. 2 mL 5    ezetimibe (ZETIA) 10 mg tablet Take 1 tablet (10 mg total) by mouth once daily. 90 tablet 3    FLUoxetine 20 MG tablet Take 1 tablet (20 mg total) by mouth once daily. 90 tablet 3    folic acid (FOLVITE) 1 MG tablet Take 1 tablet (1 mg total) by mouth Daily. 90 tablet 3    lisinopriL-hydrochlorothiazide (PRINZIDE,ZESTORETIC) 20-12.5 mg per tablet Take 1 tablet by mouth once daily. 90 tablet 3    rosuvastatin (CRESTOR) 20 MG tablet Take 1 tablet (20 mg total) by mouth once daily. 90 tablet 3    semaglutide, weight loss, (WEGOVY) 0.25 mg/0.5 mL PnIj Inject 0.25 mg into the skin every 7 days. 2 mL 1     No current facility-administered medications on file prior to visit.       REVIEW OF SYSTEMS:  Review of Systems   Constitutional:  Negative for diaphoresis and fever.   HENT:  Negative for ear pain, hearing loss, nosebleeds and tinnitus.    Eyes:  Negative for pain and redness.   Respiratory:  Negative for cough and shortness of breath.    Cardiovascular:  Negative for chest pain and palpitations.   Gastrointestinal:  Negative for blood in stool, constipation, diarrhea, nausea and vomiting.   Genitourinary:  Negative for dysuria, frequency and hematuria.   Musculoskeletal:  Positive for back pain. Negative for myalgias.   Skin:  Negative for itching and rash.   Neurological:  Negative for dizziness, tingling, seizures, weakness and headaches.   Endo/Heme/Allergies:  Negative for environmental allergies.   Psychiatric/Behavioral:  The patient is not nervous/anxious.      Review of Systems Compete; Negative, unless noted above.    GENERAL PHYSICAL EXAM:   Ht 5' 2" (1.575 m)  "  Wt 114.3 kg (251 lb 15.8 oz)   BMI 46.09 kg/m²    GEN: well developed, well nourished, no acute distress   HENT: Normocephalic, atraumatic   EYES: No discharge, conjunctiva normal   PULM: No wheezing, no respiratory distress   CV: Regular rate and rhythm     ORTHO EXAM:   Examination of the left thumb reveals no edema, erythema, ecchymosis, or skin breakdown.  Able make composite fist and fully extend all fingers.  Tenderness palpation overlying the A1 pulley of the left thumb.  Reduced range of motion regards to flexion of the left thumb.  Tenderness with forced flexion of the left thumb.  Unable to make the thumb actively trigger due to pain.  No active triggering noted the remaining fingers of the left hand.  No tenderness noted throughout the remainder of the left hand.  Normal sensation in the radial, ulnar, median nerve distributions.  Capillary refill less than 2 seconds in all fingers.    RADIOLOGY:   None.    ASSESSMENT:   Left thumb trigger finger    PLAN:  1. I discussed with Oanh Marmolejo the trigger finger pathology and treatment options in detail during today's visit.  After treatment options were discussed, we decided the best course of action this time is perform a steroid injection into the flexor tendon sheath at the level of the middle portion of the proximal phalanx of the left thumb in clinic today.  She verbally agreed with the treatment plan.    2.  Informed consent was obtained.  After an alcohol prep followed by a chlorhexidine prep, steroid injection was placed into the flexor tendon sheath at the level of the middle portion of the proximal phalanx of the left thumb.  She tolerated the procedure well with no immediate complications.    3.  I would like her to follow up in clinic on a p.r.n. basis for any worsening of her symptoms or for any hand, wrist, or elbow problems/concerns.  She was instructed to contact the clinic for any problems or concerns in the interim.    Answers  submitted by the patient for this visit:  Orthopedics Questionnaire (Submitted on 6/8/2023)  unexpected weight change: No  appetite change : No  sleep disturbance: No  IMMUNOCOMPROMISED: No  dysphoric mood: No  visual disturbance: No  sinus pressure : No  food allergies: Yes  difficulty urinating: No  painful intercourse: No  numbness: No  joint swelling: Yes   (Submitted on 6/8/2023)  Chief Complaint: Hand injury  Pain Chronicity: new  History of trauma: No  Onset: more than 1 month ago  Frequency: constantly  Progression since onset: unchanged  injury location: at home  pain- numeric: 10/10  pain location: left wrist, left fingers  pain quality: aching, tightness  Radiating Pain: No  Aggravating factors: activity, touching  inability to bear weight: No  joint locking: Yes  limited range of motion: Yes  stiffness: Yes  Treatments tried: nothing  physical therapy: not tried  Improvement on treatment: no relief

## 2023-06-12 NOTE — PROCEDURES
Tendon Sheath    Date/Time: 6/12/2023 4:00 PM  Performed by: Cristóbal Washington PA-C  Authorized by: Cristóbal Washington PA-C     Consent Done?:  Yes (Verbal)  Indications:  Pain  Site marked: the procedure site was marked    Timeout: prior to procedure the correct patient, procedure, and site was verified    Prep: patient was prepped and draped in usual sterile fashion      Local anesthesia used?: Yes    Local anesthetic:  Lidocaine 1% without epinephrine  Anesthetic total (ml):  0.5    Location:  Thumb  Site:  L thumb flexor tendon sheath  Ultrasonic guidance for needle placement?: No    Needle size:  25 G  Approach:  Volar  Medications:  40 mg triamcinolone acetonide 40 mg/mL (20 mg injected)  Patient tolerance:  Patient tolerated the procedure well with no immediate complications

## 2023-06-20 NOTE — TELEPHONE ENCOUNTER
Specialty Pharmacy - Refill Coordination    Specialty Medication Orders Linked to Encounter      Flowsheet Row Most Recent Value   Medication #1 COSENTYX PEN, 2 PENS, 150 mg/mL PnIj (Order#[id number], Rx#[id number])            Refill Questions - Documented Responses      Flowsheet Row Most Recent Value   Patient Availability and HIPAA Verification    Does patient want to proceed with activity? Yes   HIPAA/medical authority confirmed? Yes   Relationship to patient of person spoken to? Self   Refill Screening Questions    Changes to allergies? No   Changes to medications? No   New conditions since last clinic visit? No   Unplanned office visit, urgent care, ED, or hospital admission in the last 4 weeks? No   How does patient/caregiver feel medication is working? Good   Financial problems or insurance changes? No   How many doses of your specialty medications were missed in the last 4 weeks? 0   Would patient like to speak to a pharmacist? No   When does the patient need to receive the medication? 06/25/23   Refill Delivery Questions    How will the patient receive the medication? MEDRx   When does the patient need to receive the medication? 06/25/23   Shipping Address Home   Address in [facility] confirmed and updated if neccessary? Yes   Expected Copay ($) 0   Is the patient able to afford the medication copay? Yes   Payment Method zero copay   Days supply of Refill 30   Supplies needed? No supplies needed   Refill activity completed? Yes   Refill activity plan Refill scheduled   Shipment/Pickup Date: 06/22/23            Current Outpatient Medications   Medication Sig    allopurinoL (ZYLOPRIM) 300 MG tablet Take 1 tablet (300 mg total) by mouth once daily.    COSENTYX PEN, 2 PENS, 150 mg/mL PnIj Inject 2 pens (300 mg) into the skin every 30 days.    ezetimibe (ZETIA) 10 mg tablet Take 1 tablet (10 mg total) by mouth once daily.    FLUoxetine 20 MG tablet Take 1 tablet (20 mg total) by mouth once daily.     folic acid (FOLVITE) 1 MG tablet Take 1 tablet (1 mg total) by mouth Daily.    lisinopriL-hydrochlorothiazide (PRINZIDE,ZESTORETIC) 20-12.5 mg per tablet Take 1 tablet by mouth once daily.    rosuvastatin (CRESTOR) 20 MG tablet Take 1 tablet (20 mg total) by mouth once daily.    semaglutide, weight loss, (WEGOVY) 0.25 mg/0.5 mL PnIj Inject 0.25 mg into the skin every 7 days.   Last reviewed on 6/12/2023  4:06 PM by Cristóbal Washington PA-C    Review of patient's allergies indicates:   Allergen Reactions    Pistachio nut     Last reviewed on  6/12/2023 4:06 PM by Cristóbal Washington      Tasks added this encounter   No tasks added.   Tasks due within next 3 months   6/22/2023 - Refill Coordination Outreach (1 time occurrence)     Maddison Luciano, PharmD  [facility]  [address] [place] [address]  Phone: [phone]  Fax: [phone]

## 2023-07-17 ENCOUNTER — SPECIALTY PHARMACY (OUTPATIENT)
Dept: PHARMACY | Facility: CLINIC | Age: 53
End: 2023-07-17
Payer: COMMERCIAL

## 2023-07-25 ENCOUNTER — PATIENT MESSAGE (OUTPATIENT)
Dept: FAMILY MEDICINE | Facility: CLINIC | Age: 53
End: 2023-07-25

## 2023-08-01 ENCOUNTER — PATIENT MESSAGE (OUTPATIENT)
Dept: FAMILY MEDICINE | Facility: CLINIC | Age: 53
End: 2023-08-01

## 2023-08-01 DIAGNOSIS — E78.2 MIXED HYPERLIPIDEMIA: ICD-10-CM

## 2023-08-01 DIAGNOSIS — I10 ESSENTIAL HYPERTENSION: ICD-10-CM

## 2023-08-03 RX ORDER — ROSUVASTATIN CALCIUM 20 MG/1
20 TABLET, COATED ORAL DAILY
Qty: 90 TABLET | Refills: 3 | Status: SHIPPED | OUTPATIENT
Start: 2023-08-03 | End: 2024-08-02

## 2023-08-03 RX ORDER — LISINOPRIL AND HYDROCHLOROTHIAZIDE 12.5; 2 MG/1; MG/1
1 TABLET ORAL DAILY
Qty: 90 TABLET | Refills: 3 | Status: SHIPPED | OUTPATIENT
Start: 2023-08-03 | End: 2024-08-02

## 2023-08-03 RX ORDER — EZETIMIBE 10 MG/1
10 TABLET ORAL DAILY
Qty: 90 TABLET | Refills: 3 | Status: SHIPPED | OUTPATIENT
Start: 2023-08-03 | End: 2024-08-02

## 2023-08-03 NOTE — TELEPHONE ENCOUNTER
The patient's prescription has been approved and sent to   [id number] - [place], [place] - [address]  [address]  [place] [address]  Phone: [phone] Fax: [phone]

## 2023-08-17 ENCOUNTER — SPECIALTY PHARMACY (OUTPATIENT)
Dept: PHARMACY | Facility: CLINIC | Age: 53
End: 2023-08-17
Payer: COMMERCIAL

## 2023-08-17 NOTE — TELEPHONE ENCOUNTER
Specialty Pharmacy - Refill Coordination    Specialty Medication Orders Linked to Encounter      Flowsheet Row Most Recent Value   Medication #1 COSENTYX PEN, 2 PENS, 150 mg/mL PnIj (Order#[id number], Rx#[id number])            Refill Questions - Documented Responses      Flowsheet Row Most Recent Value   Patient Availability and HIPAA Verification    Does patient want to proceed with activity? Yes   HIPAA/medical authority confirmed? Yes   Relationship to patient of person spoken to? Self   Refill Screening Questions    Changes to allergies? No   Changes to medications? No   New conditions since last clinic visit? No   Unplanned office visit, urgent care, ED, or hospital admission in the last 4 weeks? No   How does patient/caregiver feel medication is working? Good   Financial problems or insurance changes? No   How many doses of your specialty medications were missed in the last 4 weeks? 0   Would patient like to speak to a pharmacist? No   When does the patient need to receive the medication? 08/25/23   Refill Delivery Questions    How will the patient receive the medication? MEDRx   When does the patient need to receive the medication? 08/25/23   Shipping Address Home   Address in [facility] confirmed and updated if neccessary? Yes   Expected Copay ($) 0   Is the patient able to afford the medication copay? Yes   Payment Method zero copay   Days supply of Refill 30   Supplies needed? No supplies needed   Refill activity completed? Yes   Refill activity plan Refill scheduled   Shipment/Pickup Date: 08/22/23            Current Outpatient Medications   Medication Sig    allopurinoL (ZYLOPRIM) 300 MG tablet Take 1 tablet (300 mg total) by mouth once daily.    COSENTYX PEN, 2 PENS, 150 mg/mL PnIj Inject 2 pens (300 mg) into the skin every 30 days.    ezetimibe (ZETIA) 10 mg tablet Take 1 tablet (10 mg total) by mouth once daily.    FLUoxetine 20 MG tablet Take 1 tablet (20 mg total) by mouth once daily.     folic acid (FOLVITE) 1 MG tablet Take 1 tablet (1 mg total) by mouth Daily.    lisinopriL-hydrochlorothiazide (PRINZIDE,ZESTORETIC) 20-12.5 mg per tablet Take 1 tablet by mouth once daily.    rosuvastatin (CRESTOR) 20 MG tablet Take 1 tablet (20 mg total) by mouth once daily.    semaglutide, weight loss, (WEGOVY) 0.25 mg/0.5 mL PnIj Inject 0.25 mg into the skin every 7 days.   Last reviewed on 6/12/2023  4:06 PM by Cristóbal Washingotn PA-C    Review of patient's allergies indicates:   Allergen Reactions    Pistachio nut     Last reviewed on  8/3/2023 5:03 PM by Ruba Irene      Tasks added this encounter   No tasks added.   Tasks due within next 3 months   No tasks due.     Sherin Espinal, PharmD  [facility]  [address] [place], [place] [address]erson sharlene  [facility] [address]  Phone: [phone]  Fax: [phone]

## 2023-09-14 DIAGNOSIS — L40.9 PSORIASIS: ICD-10-CM

## 2023-09-14 DIAGNOSIS — M25.50 ARTHRALGIA, UNSPECIFIED JOINT: ICD-10-CM

## 2023-09-14 DIAGNOSIS — L40.50 PSORIATIC ARTHRITIS: ICD-10-CM

## 2023-09-14 DIAGNOSIS — I10 ESSENTIAL HYPERTENSION: ICD-10-CM

## 2023-09-18 RX ORDER — SECUKINUMAB 150 MG/ML
300 INJECTION SUBCUTANEOUS
Qty: 2 ML | Refills: 5 | Status: ACTIVE | OUTPATIENT
Start: 2023-09-18 | End: 2024-03-18 | Stop reason: SDUPTHER

## 2023-09-20 ENCOUNTER — CLINICAL SUPPORT (OUTPATIENT)
Dept: OTHER | Facility: CLINIC | Age: 53
End: 2023-09-20
Payer: COMMERCIAL

## 2023-09-20 DIAGNOSIS — Z00.8 ENCOUNTER FOR OTHER GENERAL EXAMINATION: ICD-10-CM

## 2023-09-20 PROCEDURE — 82947 PR  ASSAY QUANTITATIVE,BLOOD GLUCOSE: ICD-10-PCS | Mod: QW,S$GLB,, | Performed by: INTERNAL MEDICINE

## 2023-09-20 PROCEDURE — 80061 LIPID PANEL: CPT | Mod: QW,S$GLB,, | Performed by: INTERNAL MEDICINE

## 2023-09-20 PROCEDURE — 82947 ASSAY GLUCOSE BLOOD QUANT: CPT | Mod: QW,S$GLB,, | Performed by: INTERNAL MEDICINE

## 2023-09-20 PROCEDURE — 99401 PR PREVENT COUNSEL,INDIV,15 MIN: ICD-10-PCS | Mod: S$GLB,,, | Performed by: INTERNAL MEDICINE

## 2023-09-20 PROCEDURE — 80061 PR  LIPID PANEL: ICD-10-PCS | Mod: QW,S$GLB,, | Performed by: INTERNAL MEDICINE

## 2023-09-20 PROCEDURE — 99401 PREV MED CNSL INDIV APPRX 15: CPT | Mod: S$GLB,,, | Performed by: INTERNAL MEDICINE

## 2023-09-22 VITALS
HEIGHT: 62 IN | WEIGHT: 247 LBS | SYSTOLIC BLOOD PRESSURE: 118 MMHG | BODY MASS INDEX: 45.45 KG/M2 | DIASTOLIC BLOOD PRESSURE: 76 MMHG

## 2023-09-22 LAB
HDLC SERPL-MCNC: 40 MG/DL
POC CHOLESTEROL, LDL (DOCK): 44 MG/DL
POC CHOLESTEROL, TOTAL: 111 MG/DL
POC GLUCOSE, FASTING: 91 MG/DL (ref 60–110)
TRIGL SERPL-MCNC: 160 MG/DL

## 2023-10-11 DIAGNOSIS — Z12.31 OTHER SCREENING MAMMOGRAM: ICD-10-CM

## 2023-10-16 ENCOUNTER — PATIENT MESSAGE (OUTPATIENT)
Dept: ADMINISTRATIVE | Facility: HOSPITAL | Age: 53
End: 2023-10-16
Payer: COMMERCIAL

## 2023-10-16 ENCOUNTER — LAB VISIT (OUTPATIENT)
Dept: LAB | Facility: HOSPITAL | Age: 53
End: 2023-10-16
Attending: INTERNAL MEDICINE
Payer: COMMERCIAL

## 2023-10-16 DIAGNOSIS — L40.50 PSORIATIC ARTHRITIS: ICD-10-CM

## 2023-10-16 DIAGNOSIS — L40.9 PSORIASIS: ICD-10-CM

## 2023-10-16 LAB
ALBUMIN SERPL BCP-MCNC: 4.3 G/DL (ref 3.5–5.2)
ALP SERPL-CCNC: 75 U/L (ref 55–135)
ALT SERPL W/O P-5'-P-CCNC: 21 U/L (ref 10–44)
ANION GAP SERPL CALC-SCNC: 15 MMOL/L (ref 8–16)
AST SERPL-CCNC: 22 U/L (ref 10–40)
BASOPHILS # BLD AUTO: 0.01 K/UL (ref 0–0.2)
BASOPHILS NFR BLD: 0.2 % (ref 0–1.9)
BILIRUB SERPL-MCNC: 1.2 MG/DL (ref 0.1–1)
BUN SERPL-MCNC: 11 MG/DL (ref 6–20)
CALCIUM SERPL-MCNC: 9.9 MG/DL (ref 8.7–10.5)
CHLORIDE SERPL-SCNC: 102 MMOL/L (ref 95–110)
CO2 SERPL-SCNC: 23 MMOL/L (ref 23–29)
CREAT SERPL-MCNC: 0.9 MG/DL (ref 0.5–1.4)
CRP SERPL-MCNC: 3.9 MG/L (ref 0–8.2)
DIFFERENTIAL METHOD: ABNORMAL
EOSINOPHIL # BLD AUTO: 0.1 K/UL (ref 0–0.5)
EOSINOPHIL NFR BLD: 1.9 % (ref 0–8)
ERYTHROCYTE [DISTWIDTH] IN BLOOD BY AUTOMATED COUNT: 14.8 % (ref 11.5–14.5)
ERYTHROCYTE [SEDIMENTATION RATE] IN BLOOD BY WESTERGREN METHOD: 25 MM/HR (ref 0–20)
EST. GFR  (NO RACE VARIABLE): >60 ML/MIN/1.73 M^2
GLUCOSE SERPL-MCNC: 107 MG/DL (ref 70–110)
HCT VFR BLD AUTO: 35.9 % (ref 37–48.5)
HGB BLD-MCNC: 11.8 G/DL (ref 12–16)
IMM GRANULOCYTES # BLD AUTO: 0.02 K/UL (ref 0–0.04)
IMM GRANULOCYTES NFR BLD AUTO: 0.3 % (ref 0–0.5)
LYMPHOCYTES # BLD AUTO: 1.6 K/UL (ref 1–4.8)
LYMPHOCYTES NFR BLD: 27 % (ref 18–48)
MCH RBC QN AUTO: 28.3 PG (ref 27–31)
MCHC RBC AUTO-ENTMCNC: 32.9 G/DL (ref 32–36)
MCV RBC AUTO: 86 FL (ref 82–98)
MONOCYTES # BLD AUTO: 0.5 K/UL (ref 0.3–1)
MONOCYTES NFR BLD: 8.1 % (ref 4–15)
NEUTROPHILS # BLD AUTO: 3.6 K/UL (ref 1.8–7.7)
NEUTROPHILS NFR BLD: 62.5 % (ref 38–73)
NRBC BLD-RTO: 0 /100 WBC
PLATELET # BLD AUTO: 253 K/UL (ref 150–450)
PMV BLD AUTO: 10.4 FL (ref 9.2–12.9)
POTASSIUM SERPL-SCNC: 4.2 MMOL/L (ref 3.5–5.1)
PROT SERPL-MCNC: 7.5 G/DL (ref 6–8.4)
RBC # BLD AUTO: 4.17 M/UL (ref 4–5.4)
SODIUM SERPL-SCNC: 140 MMOL/L (ref 136–145)
WBC # BLD AUTO: 5.77 K/UL (ref 3.9–12.7)

## 2023-10-16 PROCEDURE — 86140 C-REACTIVE PROTEIN: CPT | Performed by: INTERNAL MEDICINE

## 2023-10-16 PROCEDURE — 36415 COLL VENOUS BLD VENIPUNCTURE: CPT | Performed by: INTERNAL MEDICINE

## 2023-10-16 PROCEDURE — 85651 RBC SED RATE NONAUTOMATED: CPT | Performed by: INTERNAL MEDICINE

## 2023-10-16 PROCEDURE — 85025 COMPLETE CBC W/AUTO DIFF WBC: CPT | Performed by: INTERNAL MEDICINE

## 2023-10-16 PROCEDURE — 80053 COMPREHEN METABOLIC PANEL: CPT | Performed by: INTERNAL MEDICINE

## 2023-10-17 ENCOUNTER — OFFICE VISIT (OUTPATIENT)
Dept: RHEUMATOLOGY | Facility: CLINIC | Age: 53
End: 2023-10-17
Payer: COMMERCIAL

## 2023-10-17 VITALS
HEART RATE: 86 BPM | HEIGHT: 62 IN | WEIGHT: 252.88 LBS | BODY MASS INDEX: 46.53 KG/M2 | SYSTOLIC BLOOD PRESSURE: 121 MMHG | DIASTOLIC BLOOD PRESSURE: 77 MMHG

## 2023-10-17 DIAGNOSIS — L40.50 PSORIATIC ARTHRITIS: ICD-10-CM

## 2023-10-17 DIAGNOSIS — L40.9 PSORIASIS: Primary | ICD-10-CM

## 2023-10-17 DIAGNOSIS — Z79.899 HIGH RISK MEDICATIONS (NOT ANTICOAGULANTS) LONG-TERM USE: ICD-10-CM

## 2023-10-17 DIAGNOSIS — D84.9 IMMUNOSUPPRESSION: ICD-10-CM

## 2023-10-17 PROCEDURE — 3074F PR MOST RECENT SYSTOLIC BLOOD PRESSURE < 130 MM HG: ICD-10-PCS | Mod: CPTII,S$GLB,, | Performed by: INTERNAL MEDICINE

## 2023-10-17 PROCEDURE — 3078F PR MOST RECENT DIASTOLIC BLOOD PRESSURE < 80 MM HG: ICD-10-PCS | Mod: CPTII,S$GLB,, | Performed by: INTERNAL MEDICINE

## 2023-10-17 PROCEDURE — 99214 PR OFFICE/OUTPT VISIT, EST, LEVL IV, 30-39 MIN: ICD-10-PCS | Mod: S$GLB,,, | Performed by: INTERNAL MEDICINE

## 2023-10-17 PROCEDURE — 1159F PR MEDICATION LIST DOCUMENTED IN MEDICAL RECORD: ICD-10-PCS | Mod: CPTII,S$GLB,, | Performed by: INTERNAL MEDICINE

## 2023-10-17 PROCEDURE — 3078F DIAST BP <80 MM HG: CPT | Mod: CPTII,S$GLB,, | Performed by: INTERNAL MEDICINE

## 2023-10-17 PROCEDURE — 1159F MED LIST DOCD IN RCRD: CPT | Mod: CPTII,S$GLB,, | Performed by: INTERNAL MEDICINE

## 2023-10-17 PROCEDURE — 3008F BODY MASS INDEX DOCD: CPT | Mod: CPTII,S$GLB,, | Performed by: INTERNAL MEDICINE

## 2023-10-17 PROCEDURE — 4010F ACE/ARB THERAPY RXD/TAKEN: CPT | Mod: CPTII,S$GLB,, | Performed by: INTERNAL MEDICINE

## 2023-10-17 PROCEDURE — 99999 PR PBB SHADOW E&M-EST. PATIENT-LVL III: CPT | Mod: PBBFAC,,, | Performed by: INTERNAL MEDICINE

## 2023-10-17 PROCEDURE — 99214 OFFICE O/P EST MOD 30 MIN: CPT | Mod: S$GLB,,, | Performed by: INTERNAL MEDICINE

## 2023-10-17 PROCEDURE — 4010F PR ACE/ARB THEARPY RXD/TAKEN: ICD-10-PCS | Mod: CPTII,S$GLB,, | Performed by: INTERNAL MEDICINE

## 2023-10-17 PROCEDURE — 3074F SYST BP LT 130 MM HG: CPT | Mod: CPTII,S$GLB,, | Performed by: INTERNAL MEDICINE

## 2023-10-17 PROCEDURE — 99999 PR PBB SHADOW E&M-EST. PATIENT-LVL III: ICD-10-PCS | Mod: PBBFAC,,, | Performed by: INTERNAL MEDICINE

## 2023-10-17 PROCEDURE — 3008F PR BODY MASS INDEX (BMI) DOCUMENTED: ICD-10-PCS | Mod: CPTII,S$GLB,, | Performed by: INTERNAL MEDICINE

## 2023-10-17 RX ORDER — METHYLPREDNISOLONE 4 MG/1
TABLET ORAL
Qty: 1 EACH | Refills: 0 | Status: SHIPPED | OUTPATIENT
Start: 2023-10-17 | End: 2023-12-03

## 2023-10-17 RX ORDER — FLUOCINONIDE TOPICAL SOLUTION USP, 0.05% 0.5 MG/ML
SOLUTION TOPICAL 2 TIMES DAILY
Qty: 60 ML | Refills: 2 | Status: SHIPPED | OUTPATIENT
Start: 2023-10-17 | End: 2023-11-13

## 2023-10-17 ASSESSMENT — ROUTINE ASSESSMENT OF PATIENT INDEX DATA (RAPID3)
MDHAQ FUNCTION SCORE: 0.7
PSYCHOLOGICAL DISTRESS SCORE: 0
TOTAL RAPID3 SCORE: 1.78
PATIENT GLOBAL ASSESSMENT SCORE: 1
FATIGUE SCORE: 0
PAIN SCORE: 2

## 2023-10-17 NOTE — PROGRESS NOTES
RHEUMATOLOGY CLINIC FOLLOW UP VISIT      Chief complaints:- management of PsA       HPI:-  Oanh Bliss a 53 y.o. pleasant female with PMH of PsA ( on Cosentyx) and gout comes in for an initial visit with above chief complaints.   Left middle PIP joint with stiffness and swelling slightly improved this visit.  Present over 1 year  stiffness and +pain. Persists despite therapy.   Rheumatological history  Patient was diagnosed with PsA in   Was previously on Cosentyx was discontinued it since quarantine due to COVID concerns.  Failed Enbrel, Humira, MTX (tolerated but ineffective) .   Previously following Dr. Zack West (rheumatologist, [place])  Initial was gout as youth, then Ps, and wandering oligoarticular polyarthritis.     Cosentyx was discontinued around 2020 due to COVID concern.  She has been back on Cosentyx  300mg every 28 days since approx 2021    Ibuprofen 800mg PRN.      Pain /10, toes. ? If this is gout.     Fhx:  No psoriasis, thyroid disease, or IBD.      Tobacco (denies), EtOH (social), recreational/illicit drugs (social)     Occupation: Admin     Denies Hx of clots/miscarriages -       ROS: Denies any mouth ulcers, Raynaud's,  photosensitivity, unintentional weight loss, vision changes, SOB, CP, joint swelling, arthralgia, myalgia, urinary/bowel symptoms, N/V, fever/chills, Sicca symptoms, recent travels/sick contacts, depression, insomnia, hair loss    Diffused psoriasis - all over.  Currently on OTC cortisone cream for the itch. Bilateral ankle and L 3rd digit joint swell.  Diffused arthralgia.   Hyperpigmentation forearms not new but present for years.       Review of Systems   Constitutional:  Negative for chills, diaphoresis, fever, malaise/fatigue and weight loss.   HENT:  Negative for congestion, ear discharge, ear pain, hearing loss, nosebleeds, sinus pain and tinnitus.    Eyes:  Negative for photophobia, pain, discharge and redness.   Respiratory:   Negative for cough, hemoptysis, sputum production, shortness of breath, wheezing and stridor.    Cardiovascular:  Negative for chest pain, palpitations, orthopnea, claudication, leg swelling and PND.   Gastrointestinal:  Negative for abdominal pain, constipation, diarrhea, heartburn, nausea and vomiting.   Genitourinary:  Negative for dysuria, frequency, hematuria and urgency.   Musculoskeletal:  Negative for back pain, joint pain, myalgias and neck pain.   Skin:  Positive for itching and rash.   Neurological:  Negative for dizziness, tingling, tremors, weakness and headaches.   Endo/Heme/Allergies:  Does not bruise/bleed easily.   Psychiatric/Behavioral:  Negative for depression, hallucinations and suicidal ideas. The patient is not nervous/anxious and does not have insomnia.        Past Medical History:   Diagnosis Date    Arthritis     Back pain     Degenerative disc disease     LIZBETH (generalized anxiety disorder)     Gout flare     High triglycerides     History of gout     Hyperlipidemia     Hypertension     Joint pain     Obese     Pain of left calf     Psoriasis     Swelling of lower extremity        Past Surgical History:   Procedure Laterality Date    COLONOSCOPY N/A 7/29/2022    Procedure: COLONOSCOPY;  Surgeon: Francy Watson MD;  Location: [facility];  Service: Endoscopy;  Laterality: N/A;    ESOPHAGOGASTRODUODENOSCOPY N/A 7/29/2022    Procedure: EGD (ESOPHAGOGASTRODUODENOSCOPY);  Surgeon: Francy Watson MD;  Location: [facility];  Service: Endoscopy;  Laterality: N/A;    NECK SURGERY          Social History     Tobacco Use    Smoking status: Never    Smokeless tobacco: Never   Substance Use Topics    Alcohol use: Yes    Drug use: No       Family History   Problem Relation Age of Onset    Diabetes Father 70    Heart disease Father     Hyperlipidemia Father     Hypertension Father     ALS Father     Cancer Maternal Aunt     Cancer Paternal Uncle     Diabetes Maternal Grandmother     Diabetes Paternal  "Grandmother     Breast cancer Mother 74    Melanoma Neg Hx     Psoriasis Neg Hx     Lupus Neg Hx     Eczema Neg Hx        Review of patient's allergies indicates:   Allergen Reactions    Pistachio nut        Vitals:    10/17/23 0826   BP: 121/77   Pulse: 86   Weight: 114.7 kg (252 lb 13.9 oz)   Height: 5' 2" (1.575 m)   PainSc:   1   PainLoc: Toe       Physical Exam  Constitutional:       Comments: Obese    HENT:      Head: Normocephalic and atraumatic.   Eyes:      Conjunctiva/sclera: Conjunctivae normal.      Pupils: Pupils are equal, round, and reactive to light.   Cardiovascular:      Rate and Rhythm: Normal rate and regular rhythm.      Heart sounds: Normal heart sounds.   Pulmonary:      Effort: Pulmonary effort is normal.      Breath sounds: Normal breath sounds.   Abdominal:      General: Bowel sounds are normal.      Palpations: Abdomen is soft.   Musculoskeletal:         General: Normal range of motion.      Cervical back: Normal range of motion and neck supple.      Comments: Bilateral ankle synovitis - improvement   L 3rd digit dactylitis      Skin:     General: Skin is warm and dry.      Comments: Diffused large psoriatic plaques in bilateral UE/LE, scalp, chest, back, ears - improving from last time      Neurological:      Mental Status: She is alert and oriented to person, place, and time.      Gait: Gait is intact.   Psychiatric:         Mood and Affect: Mood and affect normal.         Cognition and Memory: Memory normal.         Judgment: Judgment normal.               Medication List with Changes/Refills   Current Medications    ALLOPURINOL (ZYLOPRIM) 300 MG TABLET    Take 1 tablet (300 mg total) by mouth once daily.    COSENTYX PEN, 2 PENS, 150 MG/ML PNIJ    Inject 2 pens (300 mg) into the skin every 30 days.    EZETIMIBE (ZETIA) 10 MG TABLET    Take 1 tablet (10 mg total) by mouth once daily.    FLUOXETINE 20 MG TABLET    Take 1 tablet (20 mg total) by mouth once daily.    FOLIC ACID (FOLVITE) 1 " MG TABLET    Take 1 tablet (1 mg total) by mouth Daily.    LISINOPRIL-HYDROCHLOROTHIAZIDE (PRINZIDE,ZESTORETIC) 20-12.5 MG PER TABLET    Take 1 tablet by mouth once daily.    ROSUVASTATIN (CRESTOR) 20 MG TABLET    Take 1 tablet (20 mg total) by mouth once daily.    SEMAGLUTIDE, WEIGHT LOSS, (WEGOVY) 0.25 MG/0.5 ML PNIJ    Inject 0.25 mg into the skin every 7 days.     Psoriasis  -     fluocinonide (LIDEX) 0.05 % external solution; Apply topically 2 (two) times daily.  Dispense: 60 mL; Refill: 2  -     methylPREDNISolone (MEDROL DOSEPACK) 4 mg tablet; use as directed  Dispense: 1 each; Refill: 0  -     Hepatitis Panel, Acute; Future; Expected date: 10/17/2023  -     QUANTIFERON GOLD TB; Future; Expected date: 10/17/2023  -     CBC Auto Differential; Standing; Expected date: 10/17/2023  -     Comprehensive Metabolic Panel; Standing; Expected date: 10/17/2023  -     Sedimentation rate; Standing; Expected date: 10/17/2023  -     C-Reactive Protein; Standing; Expected date: 10/17/2023    Psoriatic arthritis  -     fluocinonide (LIDEX) 0.05 % external solution; Apply topically 2 (two) times daily.  Dispense: 60 mL; Refill: 2  -     methylPREDNISolone (MEDROL DOSEPACK) 4 mg tablet; use as directed  Dispense: 1 each; Refill: 0  -     Hepatitis Panel, Acute; Future; Expected date: 10/17/2023  -     QUANTIFERON GOLD TB; Future; Expected date: 10/17/2023  -     CBC Auto Differential; Standing; Expected date: 10/17/2023  -     Comprehensive Metabolic Panel; Standing; Expected date: 10/17/2023  -     Sedimentation rate; Standing; Expected date: 10/17/2023  -     C-Reactive Protein; Standing; Expected date: 10/17/2023    Immunosuppression  -     Hepatitis Panel, Acute; Future; Expected date: 10/17/2023  -     QUANTIFERON GOLD TB; Future; Expected date: 10/17/2023    High risk medications (not anticoagulants) long-term use  -     fluocinonide (LIDEX) 0.05 % external solution; Apply topically 2 (two) times daily.  Dispense: 60 mL;  Refill: 2  -     methylPREDNISolone (MEDROL DOSEPACK) 4 mg tablet; use as directed  Dispense: 1 each; Refill: 0  -     Hepatitis Panel, Acute; Future; Expected date: 10/17/2023  -     QUANTIFERON GOLD TB; Future; Expected date: 10/17/2023  -     CBC Auto Differential; Standing; Expected date: 10/17/2023  -     Comprehensive Metabolic Panel; Standing; Expected date: 10/17/2023  -     Sedimentation rate; Standing; Expected date: 10/17/2023  -     C-Reactive Protein; Standing; Expected date: 10/17/2023      Assessment/Plans:-  52 y.o. pleasant female with PMH of PsA (previously on Cosentyx) and gout comes in for f/u for management of PsA.    Patient previously on Cosentyx but discontinued since March/April due to COVID scare is coming in today to establish care and experiencing PsA flare.     Had failed multiple medications in the past - Enbrel, Humira, and MTX.      Plan  - cbc, cmp, sed and c-reactive protein every 3 months. ad failed topical clobetasol in the past  - Ibuprofen 800mg TID PRN for arthralgia - to be taken with food   - Cosentyx 300mg 2 pens -continue   -right 2nd MTP discussed trying     TB 2022 neg  Hepatitis 2022 neg.       Follow up in about 6 months (around 4/17/2024).       40min consultation with greater than 50% of that time included Preparing to see the patient (review records, tests), Obtaining and/or reviewing separately obtained historical data, Performing a medically appropriate examination and/or evaluation , Ordering medications, tests, and/or procedures, Referring and communicating with other healthcare professionals , Documenting clinical information in the electronic or other health record and Independently interpreting results  (as warranted) & communicating results to the patient/family/caregiver. All questions answered.

## 2023-11-13 ENCOUNTER — OFFICE VISIT (OUTPATIENT)
Dept: FAMILY MEDICINE | Facility: CLINIC | Age: 53
End: 2023-11-13
Payer: COMMERCIAL

## 2023-11-13 VITALS
SYSTOLIC BLOOD PRESSURE: 118 MMHG | HEIGHT: 62 IN | DIASTOLIC BLOOD PRESSURE: 80 MMHG | BODY MASS INDEX: 47.41 KG/M2 | HEART RATE: 79 BPM | OXYGEN SATURATION: 97 % | WEIGHT: 257.63 LBS

## 2023-11-13 DIAGNOSIS — F41.1 GAD (GENERALIZED ANXIETY DISORDER): ICD-10-CM

## 2023-11-13 DIAGNOSIS — E78.2 MIXED HYPERLIPIDEMIA: ICD-10-CM

## 2023-11-13 DIAGNOSIS — M10.9 GOUT, UNSPECIFIED CAUSE, UNSPECIFIED CHRONICITY, UNSPECIFIED SITE: ICD-10-CM

## 2023-11-13 DIAGNOSIS — R73.9 BLOOD GLUCOSE ELEVATED: ICD-10-CM

## 2023-11-13 DIAGNOSIS — I47.10 PSVT (PAROXYSMAL SUPRAVENTRICULAR TACHYCARDIA): ICD-10-CM

## 2023-11-13 DIAGNOSIS — E66.01 CLASS 3 SEVERE OBESITY DUE TO EXCESS CALORIES WITH SERIOUS COMORBIDITY AND BODY MASS INDEX (BMI) OF 45.0 TO 49.9 IN ADULT: ICD-10-CM

## 2023-11-13 DIAGNOSIS — I10 ESSENTIAL HYPERTENSION: Primary | ICD-10-CM

## 2023-11-13 PROCEDURE — 3044F HG A1C LEVEL LT 7.0%: CPT | Mod: CPTII,S$GLB,,

## 2023-11-13 PROCEDURE — 3079F DIAST BP 80-89 MM HG: CPT | Mod: CPTII,S$GLB,,

## 2023-11-13 PROCEDURE — 3044F PR MOST RECENT HEMOGLOBIN A1C LEVEL <7.0%: ICD-10-PCS | Mod: CPTII,S$GLB,,

## 2023-11-13 PROCEDURE — 99214 OFFICE O/P EST MOD 30 MIN: CPT | Mod: S$GLB,,,

## 2023-11-13 PROCEDURE — 1160F PR REVIEW ALL MEDS BY PRESCRIBER/CLIN PHARMACIST DOCUMENTED: ICD-10-PCS | Mod: CPTII,S$GLB,,

## 2023-11-13 PROCEDURE — 3074F SYST BP LT 130 MM HG: CPT | Mod: CPTII,S$GLB,,

## 2023-11-13 PROCEDURE — 4010F PR ACE/ARB THEARPY RXD/TAKEN: ICD-10-PCS | Mod: CPTII,S$GLB,,

## 2023-11-13 PROCEDURE — 3074F PR MOST RECENT SYSTOLIC BLOOD PRESSURE < 130 MM HG: ICD-10-PCS | Mod: CPTII,S$GLB,,

## 2023-11-13 PROCEDURE — 4010F ACE/ARB THERAPY RXD/TAKEN: CPT | Mod: CPTII,S$GLB,,

## 2023-11-13 PROCEDURE — 3008F PR BODY MASS INDEX (BMI) DOCUMENTED: ICD-10-PCS | Mod: CPTII,S$GLB,,

## 2023-11-13 PROCEDURE — 3008F BODY MASS INDEX DOCD: CPT | Mod: CPTII,S$GLB,,

## 2023-11-13 PROCEDURE — 1159F PR MEDICATION LIST DOCUMENTED IN MEDICAL RECORD: ICD-10-PCS | Mod: CPTII,S$GLB,,

## 2023-11-13 PROCEDURE — 99214 PR OFFICE/OUTPT VISIT, EST, LEVL IV, 30-39 MIN: ICD-10-PCS | Mod: S$GLB,,,

## 2023-11-13 PROCEDURE — 3079F PR MOST RECENT DIASTOLIC BLOOD PRESSURE 80-89 MM HG: ICD-10-PCS | Mod: CPTII,S$GLB,,

## 2023-11-13 PROCEDURE — 1159F MED LIST DOCD IN RCRD: CPT | Mod: CPTII,S$GLB,,

## 2023-11-13 PROCEDURE — 1160F RVW MEDS BY RX/DR IN RCRD: CPT | Mod: CPTII,S$GLB,,

## 2023-11-13 RX ORDER — FLUOXETINE 20 MG/1
20 TABLET ORAL DAILY
Qty: 90 TABLET | Refills: 3 | Status: SHIPPED | OUTPATIENT
Start: 2023-11-13

## 2023-11-13 RX ORDER — ALLOPURINOL 300 MG/1
300 TABLET ORAL DAILY
Qty: 90 TABLET | Refills: 3 | Status: SHIPPED | OUTPATIENT
Start: 2023-11-13

## 2023-11-13 RX ORDER — FOLIC ACID 1 MG/1
1 TABLET ORAL DAILY
Qty: 90 TABLET | Refills: 3 | Status: SHIPPED | OUTPATIENT
Start: 2023-11-13

## 2023-11-13 NOTE — PROGRESS NOTES
SUBJECTIVE:    Patient ID: Oanh Marmolejo is a 53 y.o. female.    Chief Complaint: Follow-up (9 mo follow up for medication refill and 6 mo lab work/ declines flu vaccine//mp)    53-year-old established female patient presents to clinic today to talk mostly about her weight.  She also needs refills today.  Patient would like to be checked for diabetes and have her thyroid checked due to her weight gain.  She states she is also unable to lose weight no matter what she tries.  We did discuss diet and exercise.  There are some deficiencies in these areas we did discuss this as well.  Patient was previously prescribed Wegovy but has been unable to locate this medication at any pharmacy.  She would like to discuss other options for weight loss.  She would like to get off of some of her medications that she takes for her chronic illnesses that she knows are somewhat related to her weight.    Health maintenance discussed as below:    Shingles Vaccine(1 of 2) Never done  Mammogram due on 09/28/2023  Colorectal Cancer Screening due on 07/29/2027  Lipid Panel due on 09/20/2028  TETANUS VACCINE due on 02/25/2031  Hepatitis C Screening Completed     The 10-year CVD risk score (D'Agostino, et al., 2008) is: 3.5%    Values used to calculate the score:      Age: 53 years      Sex: Female      Diabetic: No      Tobacco smoker: No      Systolic Blood Pressure: 118 mmHg      Is BP treated: Yes      HDL Cholesterol: 30 mg/dL      Total Cholesterol: 101 mg/dL     Follow-up  Pertinent negatives include no abdominal pain, arthralgias, chest pain, chills, congestion, coughing, fatigue, fever, headaches, myalgias, nausea, numbness, rash, sore throat, vomiting or weakness.       Lab Visit on 10/16/2023   Component Date Value Ref Range Status    WBC 10/16/2023 5.77  3.90 - 12.70 K/uL Final    RBC 10/16/2023 4.17  4.00 - 5.40 M/uL Final    Hemoglobin 10/16/2023 11.8 (L)  12.0 - 16.0 g/dL Final    Hematocrit 10/16/2023 35.9 (L)  37.0 -  48.5 % Final    MCV 10/16/2023 86  82 - 98 fL Final    MCH 10/16/2023 28.3  27.0 - 31.0 pg Final    MCHC 10/16/2023 32.9  32.0 - 36.0 g/dL Final    RDW 10/16/2023 14.8 (H)  11.5 - 14.5 % Final    Platelets 10/16/2023 253  150 - 450 K/uL Final    MPV 10/16/2023 10.4  9.2 - 12.9 fL Final    Immature Granulocytes 10/16/2023 0.3  0.0 - 0.5 % Final    Gran # (ANC) 10/16/2023 3.6  1.8 - 7.7 K/uL Final    Immature Grans (Abs) 10/16/2023 0.02  0.00 - 0.04 K/uL Final    Lymph # 10/16/2023 1.6  1.0 - 4.8 K/uL Final    Mono # 10/16/2023 0.5  0.3 - 1.0 K/uL Final    Eos # 10/16/2023 0.1  0.0 - 0.5 K/uL Final    Baso # 10/16/2023 0.01  0.00 - 0.20 K/uL Final    nRBC 10/16/2023 0  0 /100 WBC Final    Gran % 10/16/2023 62.5  38.0 - 73.0 % Final    Lymph % 10/16/2023 27.0  18.0 - 48.0 % Final    Mono % 10/16/2023 8.1  4.0 - 15.0 % Final    Eosinophil % 10/16/2023 1.9  0.0 - 8.0 % Final    Basophil % 10/16/2023 0.2  0.0 - 1.9 % Final    Differential Method 10/16/2023 Automated   Final    Sodium 10/16/2023 140  136 - 145 mmol/L Final    Potassium 10/16/2023 4.2  3.5 - 5.1 mmol/L Final    Chloride 10/16/2023 102  95 - 110 mmol/L Final    CO2 10/16/2023 23  23 - 29 mmol/L Final    Glucose 10/16/2023 107  70 - 110 mg/dL Final    BUN 10/16/2023 11  6 - 20 mg/dL Final    Creatinine 10/16/2023 0.9  0.5 - 1.4 mg/dL Final    Calcium 10/16/2023 9.9  8.7 - 10.5 mg/dL Final    Total Protein 10/16/2023 7.5  6.0 - 8.4 g/dL Final    Albumin 10/16/2023 4.3  3.5 - 5.2 g/dL Final    Total Bilirubin 10/16/2023 1.2 (H)  0.1 - 1.0 mg/dL Final    Alkaline Phosphatase 10/16/2023 75  55 - 135 U/L Final    AST 10/16/2023 22  10 - 40 U/L Final    ALT 10/16/2023 21  10 - 44 U/L Final    eGFR 10/16/2023 >60  >60 mL/min/1.73 m^2 Final    Anion Gap 10/16/2023 15  8 - 16 mmol/L Final    Sed Rate 10/16/2023 25 (H)  0 - 20 mm/Hr Final    CRP 10/16/2023 3.9  0.0 - 8.2 mg/L Final   Clinical Support on 09/20/2023   Component Date Value Ref Range Status    POC  Cholesterol, Total 09/20/2023 111  <240 MG/DL Final    POC Cholesterol, HDL 09/20/2023 40  MG/DL Final    POC Cholesterol, LDL 09/20/2023 44  <160 MG/DL Final    POC Triglycerides 09/20/2023 160 (H)  <160 MG/DL Final    POC Glucose, Fasting 09/20/2023 91  60 - 110 MG/DL Final       Past Medical History:   Diagnosis Date    Arthritis     Back pain     Degenerative disc disease     LIZBETH (generalized anxiety disorder)     Gout flare     High triglycerides     History of gout     Hyperlipidemia     Hypertension     Joint pain     Obese     Pain of left calf     Psoriasis     Swelling of lower extremity      Social History     Socioeconomic History    Marital status:    Tobacco Use    Smoking status: Never    Smokeless tobacco: Never   Substance and Sexual Activity    Alcohol use: Yes    Drug use: No    Sexual activity: Not Currently     Past Surgical History:   Procedure Laterality Date    COLONOSCOPY N/A 7/29/2022    Procedure: COLONOSCOPY;  Surgeon: Francy Watson MD;  Location: [facility];  Service: Endoscopy;  Laterality: N/A;    ESOPHAGOGASTRODUODENOSCOPY N/A 7/29/2022    Procedure: EGD (ESOPHAGOGASTRODUODENOSCOPY);  Surgeon: Francy Watson MD;  Location: [facility];  Service: Endoscopy;  Laterality: N/A;    NECK SURGERY       Family History   Problem Relation Age of Onset    Diabetes Father 70    Heart disease Father     Hyperlipidemia Father     Hypertension Father     ALS Father     Cancer Maternal Aunt     Cancer Paternal Uncle     Diabetes Maternal Grandmother     Diabetes Paternal Grandmother     Breast cancer Mother 74    Melanoma Neg Hx     Psoriasis Neg Hx     Lupus Neg Hx     Eczema Neg Hx        Review of patient's allergies indicates:   Allergen Reactions    Pistachio nut        Current Outpatient Medications:     allopurinoL (ZYLOPRIM) 300 MG tablet, Take 1 tablet (300 mg total) by mouth once daily., Disp: 90 tablet, Rfl: 3    COSENTYX PEN, 2 PENS, 150 mg/mL PnIj, Inject 2 pens (300 mg) into  "the skin every 30 days., Disp: 2 mL, Rfl: 5    ezetimibe (ZETIA) 10 mg tablet, Take 1 tablet (10 mg total) by mouth once daily., Disp: 90 tablet, Rfl: 3    FLUoxetine 20 MG tablet, Take 1 tablet (20 mg total) by mouth once daily., Disp: 90 tablet, Rfl: 3    folic acid (FOLVITE) 1 MG tablet, Take 1 tablet (1 mg total) by mouth Daily., Disp: 90 tablet, Rfl: 3    lisinopriL-hydrochlorothiazide (PRINZIDE,ZESTORETIC) 20-12.5 mg per tablet, Take 1 tablet by mouth once daily., Disp: 90 tablet, Rfl: 3    rosuvastatin (CRESTOR) 20 MG tablet, Take 1 tablet (20 mg total) by mouth once daily., Disp: 90 tablet, Rfl: 3    Review of Systems   Constitutional:  Negative for activity change, appetite change, chills, fatigue, fever and unexpected weight change.   HENT:  Negative for nasal congestion, ear pain, postnasal drip, rhinorrhea, sore throat and trouble swallowing.    Eyes:  Negative for discharge and visual disturbance.   Respiratory:  Negative for apnea, cough, shortness of breath and wheezing.    Cardiovascular:  Negative for chest pain, palpitations and leg swelling.   Gastrointestinal:  Negative for abdominal pain, blood in stool, constipation, diarrhea, nausea, vomiting and reflux.   Genitourinary:  Negative for bladder incontinence, dysuria, frequency and urgency.   Musculoskeletal:  Negative for arthralgias, gait problem, leg pain and myalgias.   Integumentary:  Negative for rash and mole/lesion.        Still taking Cosentyx    Neurological:  Negative for dizziness, tremors, weakness, light-headedness, numbness and headaches.   Hematological:  Does not bruise/bleed easily.   Psychiatric/Behavioral:  Positive for dysphoric mood. Negative for sleep disturbance and suicidal ideas. The patient is nervous/anxious.            Objective:      Vitals:    11/13/23 1504   BP: 118/80   Pulse: 79   SpO2: 97%   Weight: 116.8 kg (257 lb 9.6 oz)   Height: 5' 1.81" (1.57 m)     Physical Exam  Constitutional:       General: She is not " in acute distress.     Appearance: Normal appearance. She is obese. She is not ill-appearing.   HENT:      Head: Normocephalic and atraumatic.      Nose: Nose normal.      Mouth/Throat:      Mouth: Mucous membranes are moist.   Eyes:      General: No scleral icterus.     Pupils: Pupils are equal, round, and reactive to light.   Neck:      Vascular: No carotid bruit.   Cardiovascular:      Rate and Rhythm: Normal rate and regular rhythm.      Pulses: Normal pulses.      Heart sounds: Normal heart sounds. No murmur heard.  Pulmonary:      Effort: Pulmonary effort is normal.      Breath sounds: Normal breath sounds.   Abdominal:      Palpations: Abdomen is soft.   Musculoskeletal:         General: Normal range of motion.      Cervical back: Normal range of motion.      Right lower leg: No edema.      Left lower leg: No edema.   Skin:     General: Skin is warm and dry.      Capillary Refill: Capillary refill takes less than 2 seconds.      Coloration: Skin is not jaundiced or pale.   Neurological:      General: No focal deficit present.      Mental Status: She is alert and oriented to person, place, and time.      Cranial Nerves: No cranial nerve deficit.      Motor: No weakness.      Gait: Gait normal.   Psychiatric:         Mood and Affect: Mood normal.         Behavior: Behavior normal.         Thought Content: Thought content normal.         Judgment: Judgment normal.           Assessment:       1. Essential hypertension    2. Gout, unspecified cause, unspecified chronicity, unspecified site    3. LIZBETH (generalized anxiety disorder)    4. Blood glucose elevated    5. Mixed hyperlipidemia    6. PSVT (paroxysmal supraventricular tachycardia)    7. Class 3 severe obesity due to excess calories with serious comorbidity and body mass index (BMI) of 45.0 to 49.9 in adult         Plan:       Essential hypertension  Well-controlled.  Continue as is.  Does not need refills today.  -     T4, Free; Future; Expected date:  11/13/2023  -     TSH; Future; Expected date: 11/13/2023    Gout, unspecified cause, unspecified chronicity, unspecified site  No recent flare-ups.  Continue as is.  Needs refills of allopurinol.  -     allopurinoL (ZYLOPRIM) 300 MG tablet; Take 1 tablet (300 mg total) by mouth once daily.  Dispense: 90 tablet; Refill: 3    LIZBETH (generalized anxiety disorder)  Patient managing symptoms well with fluoxetine.  Refills today.  -     FLUoxetine 20 MG tablet; Take 1 tablet (20 mg total) by mouth once daily.  Dispense: 90 tablet; Refill: 3    Blood glucose elevated  -     Hemoglobin A1C; Future; Expected date: 11/13/2023    Mixed hyperlipidemia  Well-controlled on current regimen.  No changes today.    PSVT (paroxysmal supraventricular tachycardia)  We did discuss today that having this condition and hypertension does limit the options that we have in treating her obesity.  I do not advise that she takes any type of stimulant such as phentermine.    Class 3 severe obesity due to excess calories with serious comorbidity and body mass index (BMI) of 45.0 to 49.9 in adult  We discussed possibility of consulting with bariatric pending on what her lab results show.  Patient would like to wait for lab results to see what next step should be.    Follow up in about 3 months (around 2/13/2024), or if symptoms worsen or fail to improve, for Dr Coronel.        12/3/2023 Eunice Guillen

## 2023-11-15 ENCOUNTER — LAB VISIT (OUTPATIENT)
Dept: LAB | Facility: HOSPITAL | Age: 53
End: 2023-11-15
Payer: COMMERCIAL

## 2023-11-15 DIAGNOSIS — I10 ESSENTIAL HYPERTENSION: ICD-10-CM

## 2023-11-15 DIAGNOSIS — R73.9 BLOOD GLUCOSE ELEVATED: ICD-10-CM

## 2023-11-15 LAB
ESTIMATED AVG GLUCOSE: 108 MG/DL (ref 68–131)
HBA1C MFR BLD: 5.4 % (ref 4.5–6.2)
T4 FREE SERPL-MCNC: 0.82 NG/DL (ref 0.71–1.51)
TSH SERPL DL<=0.005 MIU/L-ACNC: 2.05 UIU/ML (ref 0.4–4)

## 2023-11-15 PROCEDURE — 84439 ASSAY OF FREE THYROXINE: CPT

## 2023-11-15 PROCEDURE — 83036 HEMOGLOBIN GLYCOSYLATED A1C: CPT

## 2023-11-15 PROCEDURE — 84443 ASSAY THYROID STIM HORMONE: CPT

## 2023-11-15 PROCEDURE — 36415 COLL VENOUS BLD VENIPUNCTURE: CPT

## 2023-11-16 ENCOUNTER — PATIENT MESSAGE (OUTPATIENT)
Dept: FAMILY MEDICINE | Facility: CLINIC | Age: 53
End: 2023-11-16

## 2023-11-16 ENCOUNTER — TELEPHONE (OUTPATIENT)
Dept: FAMILY MEDICINE | Facility: CLINIC | Age: 53
End: 2023-11-16

## 2023-11-16 DIAGNOSIS — E66.01 CLASS 3 SEVERE OBESITY WITH SERIOUS COMORBIDITY AND BODY MASS INDEX (BMI) OF 45.0 TO 49.9 IN ADULT, UNSPECIFIED OBESITY TYPE: ICD-10-CM

## 2023-11-16 DIAGNOSIS — E66.01 CLASS 3 SEVERE OBESITY DUE TO EXCESS CALORIES WITH SERIOUS COMORBIDITY AND BODY MASS INDEX (BMI) OF 40.0 TO 44.9 IN ADULT: Primary | ICD-10-CM

## 2023-11-16 NOTE — TELEPHONE ENCOUNTER
----- Message from TESS Gallegos sent at 11/16/2023  5:24 PM CST -----  Please let Ms. Marmolejo know that she does not have diabetes. Her thyroid is normal.  I would say follow up with bariatrics as we discussed.

## 2023-11-16 NOTE — PROGRESS NOTES
Please let Ms. Marmolejo know that she does not have diabetes. Her thyroid is normal.  I would say follow up with bariatrics as we discussed.

## 2023-11-16 NOTE — TELEPHONE ENCOUNTER
Spoke to pt verbatim per Eunice. Pt voiced  understanding.   Pt would like a referral no sure who to refer to

## 2023-11-17 ENCOUNTER — TELEPHONE (OUTPATIENT)
Dept: BARIATRICS | Facility: CLINIC | Age: 53
End: 2023-11-17
Payer: COMMERCIAL

## 2023-11-29 ENCOUNTER — TELEPHONE (OUTPATIENT)
Dept: BARIATRICS | Facility: CLINIC | Age: 53
End: 2023-11-29
Payer: COMMERCIAL

## 2023-12-03 PROBLEM — E66.01 CLASS 3 SEVERE OBESITY DUE TO EXCESS CALORIES WITH SERIOUS COMORBIDITY AND BODY MASS INDEX (BMI) OF 45.0 TO 49.9 IN ADULT: Status: ACTIVE | Noted: 2020-03-04

## 2023-12-03 PROBLEM — E66.813 CLASS 3 SEVERE OBESITY DUE TO EXCESS CALORIES WITH SERIOUS COMORBIDITY AND BODY MASS INDEX (BMI) OF 45.0 TO 49.9 IN ADULT: Status: ACTIVE | Noted: 2020-03-04

## 2023-12-05 ENCOUNTER — PATIENT MESSAGE (OUTPATIENT)
Dept: RHEUMATOLOGY | Facility: CLINIC | Age: 53
End: 2023-12-05
Payer: COMMERCIAL

## 2024-01-31 ENCOUNTER — OFFICE VISIT (OUTPATIENT)
Dept: DERMATOLOGY | Facility: CLINIC | Age: 54
End: 2024-01-31
Payer: COMMERCIAL

## 2024-01-31 DIAGNOSIS — L81.4 LENTIGO: ICD-10-CM

## 2024-01-31 DIAGNOSIS — D18.01 CHERRY ANGIOMA: ICD-10-CM

## 2024-01-31 DIAGNOSIS — L82.1 SEBORRHEIC KERATOSES: ICD-10-CM

## 2024-01-31 DIAGNOSIS — L91.8 MULTIPLE ACQUIRED SKIN TAGS: Primary | ICD-10-CM

## 2024-01-31 DIAGNOSIS — D22.9 MULTIPLE BENIGN NEVI: ICD-10-CM

## 2024-01-31 PROCEDURE — 17110 DESTRUCTION B9 LES UP TO 14: CPT | Mod: S$GLB,,, | Performed by: STUDENT IN AN ORGANIZED HEALTH CARE EDUCATION/TRAINING PROGRAM

## 2024-01-31 PROCEDURE — 1159F MED LIST DOCD IN RCRD: CPT | Mod: CPTII,S$GLB,, | Performed by: STUDENT IN AN ORGANIZED HEALTH CARE EDUCATION/TRAINING PROGRAM

## 2024-01-31 PROCEDURE — 99203 OFFICE O/P NEW LOW 30 MIN: CPT | Mod: 25,S$GLB,, | Performed by: STUDENT IN AN ORGANIZED HEALTH CARE EDUCATION/TRAINING PROGRAM

## 2024-01-31 PROCEDURE — 4010F ACE/ARB THERAPY RXD/TAKEN: CPT | Mod: CPTII,S$GLB,, | Performed by: STUDENT IN AN ORGANIZED HEALTH CARE EDUCATION/TRAINING PROGRAM

## 2024-01-31 PROCEDURE — 1160F RVW MEDS BY RX/DR IN RCRD: CPT | Mod: CPTII,S$GLB,, | Performed by: STUDENT IN AN ORGANIZED HEALTH CARE EDUCATION/TRAINING PROGRAM

## 2024-01-31 NOTE — PROGRESS NOTES
Subjective:      Patient ID:  Oanh Marmolejo is a 53 y.o. female who presents for   Chief Complaint   Patient presents with    Skin Check     TBSC     LOV 11/26/2019    Patient is coming today for TBSC.   No areas of concern.     Denies PHX NMSC  Denied FHX MM    Hx of psoriatic arthritis- on cosentyx      Review of Systems   Constitutional:  Negative for fever, chills and fatigue.   Respiratory:  Negative for cough and shortness of breath.    Gastrointestinal:  Negative for nausea, vomiting and diarrhea.   Skin:  Positive for activity-related sunscreen use and wears hat. Negative for daily sunscreen use.   Hematologic/Lymphatic: Does not bruise/bleed easily.       Objective:   Physical Exam   Constitutional: She appears well-developed and well-nourished. No distress.   Neurological: She is alert and oriented to person, place, and time. She is not disoriented.   Psychiatric: She has a normal mood and affect.   Skin:   Areas Examined (abnormalities noted in diagram):   Scalp / Hair Palpated and Inspected  Head / Face Inspection Performed  Neck Inspection Performed  Chest / Axilla Inspection Performed  Abdomen Inspection Performed  Genitals / Buttocks / Groin Inspection Performed  Back Inspection Performed  RUE Inspected  LUE Inspection Performed  RLE Inspected  LLE Inspection Performed  Nails and Digits Inspection Performed                     Diagram Legend     Erythematous scaling macule/papule c/w actinic keratosis       Vascular papule c/w angioma      Pigmented verrucoid papule/plaque c/w seborrheic keratosis      Yellow umbilicated papule c/w sebaceous hyperplasia      Irregularly shaped tan macule c/w lentigo     1-2 mm smooth white papules consistent with Milia      Movable subcutaneous cyst with punctum c/w epidermal inclusion cyst      Subcutaneous movable cyst c/w pilar cyst      Firm pink to brown papule c/w dermatofibroma      Pedunculated fleshy papule(s) c/w skin tag(s)      Evenly pigmented macule  c/w junctional nevus     Mildly variegated pigmented, slightly irregular-bordered macule c/w mildly atypical nevus      Flesh colored to evenly pigmented papule c/w intradermal nevus       Pink pearly papule/plaque c/w basal cell carcinoma      Erythematous hyperkeratotic cursted plaque c/w SCC      Surgical scar with no sign of skin cancer recurrence      Open and closed comedones      Inflammatory papules and pustules      Verrucoid papule consistent consistent with wart     Erythematous eczematous patches and plaques     Dystrophic onycholytic nail with subungual debris c/w onychomycosis     Umbilicated papule    Erythematous-base heme-crusted tan verrucoid plaque consistent with inflamed seborrheic keratosis     Erythematous Silvery Scaling Plaque c/w Psoriasis     See annotation      Assessment / Plan:        Multiple acquired skin tags, inflamed  Cryosurgery procedure note:    Verbal consent from the patient is obtained. Liquid nitrogen cryosurgery is applied to 3 lesions to produce a freeze injury. The patient is aware that blisters may form and is instructed on wound care with gentle cleansing and use of vaseline ointment to keep moist until healed. The patient is supplied a handout on cryosurgery and is instructed to call if lesions do not completely resolve.    Cherry angioma  This is a benign vascular lesion. Reassurance given. No treatment required.     Seborrheic keratoses  These are benign inherited growths without a malignant potential. Reassurance given to patient. No treatment is necessary.     Multiple benign nevi  Careful dermoscopy evaluation of nevi performed with none identified as needing biopsy today  Monitor for new mole or moles that are becoming bigger, darker, irritated, or developing irregular borders.   Total body skin examination performed today including at least 12 points as noted in physical examination. No lesions suspicious for malignancy noted.  Patient instructed in importance  in daily broad spectrum sun protection of at least spf 30. Mineral sunscreen ingredients preferred (Zinc +/- Titanium) and can be found OTC.   Patient encouraged to wear hat for all outdoor exposure.   Also discussed sun avoidance and use of protective clothing.    Lentigo  This is a benign hyperpigmented sun induced lesion. Daily sun protection will reduce the number of new lesions. Treatment of these benign lesions are considered cosmetic.             No follow-ups on file.

## 2024-02-14 ENCOUNTER — PATIENT MESSAGE (OUTPATIENT)
Dept: RHEUMATOLOGY | Facility: CLINIC | Age: 54
End: 2024-02-14
Payer: COMMERCIAL

## 2024-02-16 ENCOUNTER — LAB VISIT (OUTPATIENT)
Dept: LAB | Facility: HOSPITAL | Age: 54
End: 2024-02-16
Attending: INTERNAL MEDICINE
Payer: COMMERCIAL

## 2024-02-16 DIAGNOSIS — L40.9 PSORIASIS: ICD-10-CM

## 2024-02-16 DIAGNOSIS — D84.9 IMMUNOSUPPRESSION: ICD-10-CM

## 2024-02-16 DIAGNOSIS — L40.50 PSORIATIC ARTHRITIS: ICD-10-CM

## 2024-02-16 DIAGNOSIS — Z79.899 HIGH RISK MEDICATIONS (NOT ANTICOAGULANTS) LONG-TERM USE: ICD-10-CM

## 2024-02-16 LAB
ALBUMIN SERPL BCP-MCNC: 4.5 G/DL (ref 3.5–5.2)
ALP SERPL-CCNC: 76 U/L (ref 55–135)
ALT SERPL W/O P-5'-P-CCNC: 24 U/L (ref 10–44)
ANION GAP SERPL CALC-SCNC: 10 MMOL/L (ref 8–16)
AST SERPL-CCNC: 20 U/L (ref 10–40)
BASOPHILS # BLD AUTO: 0.01 K/UL (ref 0–0.2)
BASOPHILS NFR BLD: 0.2 % (ref 0–1.9)
BILIRUB SERPL-MCNC: 1.5 MG/DL (ref 0.1–1)
BUN SERPL-MCNC: 16 MG/DL (ref 6–20)
CALCIUM SERPL-MCNC: 9.9 MG/DL (ref 8.7–10.5)
CHLORIDE SERPL-SCNC: 105 MMOL/L (ref 95–110)
CO2 SERPL-SCNC: 24 MMOL/L (ref 23–29)
CREAT SERPL-MCNC: 0.9 MG/DL (ref 0.5–1.4)
CRP SERPL-MCNC: 2.1 MG/L (ref 0–8.2)
DIFFERENTIAL METHOD BLD: ABNORMAL
EOSINOPHIL # BLD AUTO: 0.1 K/UL (ref 0–0.5)
EOSINOPHIL NFR BLD: 1.4 % (ref 0–8)
ERYTHROCYTE [DISTWIDTH] IN BLOOD BY AUTOMATED COUNT: 14.5 % (ref 11.5–14.5)
ERYTHROCYTE [SEDIMENTATION RATE] IN BLOOD BY WESTERGREN METHOD: 15 MM/HR (ref 0–20)
EST. GFR  (NO RACE VARIABLE): >60 ML/MIN/1.73 M^2
GLUCOSE SERPL-MCNC: 102 MG/DL (ref 70–110)
HCT VFR BLD AUTO: 36.8 % (ref 37–48.5)
HGB BLD-MCNC: 12.1 G/DL (ref 12–16)
IMM GRANULOCYTES # BLD AUTO: 0.01 K/UL (ref 0–0.04)
IMM GRANULOCYTES NFR BLD AUTO: 0.2 % (ref 0–0.5)
LYMPHOCYTES # BLD AUTO: 1.3 K/UL (ref 1–4.8)
LYMPHOCYTES NFR BLD: 25.2 % (ref 18–48)
MCH RBC QN AUTO: 27.9 PG (ref 27–31)
MCHC RBC AUTO-ENTMCNC: 32.9 G/DL (ref 32–36)
MCV RBC AUTO: 85 FL (ref 82–98)
MONOCYTES # BLD AUTO: 0.3 K/UL (ref 0.3–1)
MONOCYTES NFR BLD: 6.5 % (ref 4–15)
NEUTROPHILS # BLD AUTO: 3.3 K/UL (ref 1.8–7.7)
NEUTROPHILS NFR BLD: 66.5 % (ref 38–73)
NRBC BLD-RTO: 0 /100 WBC
PLATELET # BLD AUTO: 227 K/UL (ref 150–450)
PMV BLD AUTO: 10.6 FL (ref 9.2–12.9)
POTASSIUM SERPL-SCNC: 3.9 MMOL/L (ref 3.5–5.1)
PROT SERPL-MCNC: 7.6 G/DL (ref 6–8.4)
RBC # BLD AUTO: 4.33 M/UL (ref 4–5.4)
SODIUM SERPL-SCNC: 139 MMOL/L (ref 136–145)
WBC # BLD AUTO: 4.96 K/UL (ref 3.9–12.7)

## 2024-02-16 PROCEDURE — 36415 COLL VENOUS BLD VENIPUNCTURE: CPT | Performed by: INTERNAL MEDICINE

## 2024-02-16 PROCEDURE — 85025 COMPLETE CBC W/AUTO DIFF WBC: CPT | Performed by: INTERNAL MEDICINE

## 2024-02-16 PROCEDURE — 80074 ACUTE HEPATITIS PANEL: CPT | Performed by: INTERNAL MEDICINE

## 2024-02-16 PROCEDURE — 80053 COMPREHEN METABOLIC PANEL: CPT | Performed by: INTERNAL MEDICINE

## 2024-02-16 PROCEDURE — 86140 C-REACTIVE PROTEIN: CPT | Performed by: INTERNAL MEDICINE

## 2024-02-16 PROCEDURE — 85651 RBC SED RATE NONAUTOMATED: CPT | Performed by: INTERNAL MEDICINE

## 2024-02-17 LAB
HAV IGM SERPL QL IA: NEGATIVE
HBV CORE IGM SERPL QL IA: NEGATIVE
HBV SURFACE AG SERPL QL IA: NEGATIVE
HCV IGG SERPL QL IA: NEGATIVE

## 2024-02-20 ENCOUNTER — LAB VISIT (OUTPATIENT)
Dept: LAB | Facility: HOSPITAL | Age: 54
End: 2024-02-20
Attending: INTERNAL MEDICINE
Payer: COMMERCIAL

## 2024-02-20 DIAGNOSIS — L40.9 PSORIASIS: ICD-10-CM

## 2024-02-20 DIAGNOSIS — L40.50 PSORIATIC ARTHRITIS: ICD-10-CM

## 2024-02-20 DIAGNOSIS — Z79.899 HIGH RISK MEDICATIONS (NOT ANTICOAGULANTS) LONG-TERM USE: ICD-10-CM

## 2024-02-20 DIAGNOSIS — D84.9 IMMUNOSUPPRESSION: ICD-10-CM

## 2024-02-20 PROCEDURE — 36415 COLL VENOUS BLD VENIPUNCTURE: CPT | Performed by: INTERNAL MEDICINE

## 2024-02-20 PROCEDURE — 86480 TB TEST CELL IMMUN MEASURE: CPT | Performed by: INTERNAL MEDICINE

## 2024-02-22 LAB
GAMMA INTERFERON BACKGROUND BLD IA-ACNC: 0.04 IU/ML
M TB IFN-G BLD-IMP: NEGATIVE
M TB IFN-G CD4+ BCKGRND COR BLD-ACNC: 0.06 IU/ML
M TB IFN-G CD4+CD8+ BCKGRND COR BLD-ACNC: 0.07 IU/ML
MITOGEN IGNF BCKGRD COR BLD-ACNC: 8.99 IU/ML

## 2024-02-29 ENCOUNTER — PROCEDURE VISIT (OUTPATIENT)
Dept: DERMATOLOGY | Facility: CLINIC | Age: 54
End: 2024-02-29

## 2024-02-29 DIAGNOSIS — L91.8 SKIN TAG: Primary | ICD-10-CM

## 2024-02-29 PROCEDURE — 99499 UNLISTED E&M SERVICE: CPT | Mod: S$GLB,,, | Performed by: STUDENT IN AN ORGANIZED HEALTH CARE EDUCATION/TRAINING PROGRAM

## 2024-02-29 PROCEDURE — 11200 RMVL SKIN TAGS UP TO&INC 15: CPT | Mod: CSM,S$GLB,, | Performed by: STUDENT IN AN ORGANIZED HEALTH CARE EDUCATION/TRAINING PROGRAM

## 2024-02-29 NOTE — PROGRESS NOTES
Subjective:      Patient ID:  Oanh Marmolejo is a 53 y.o. female who presents for   Chief Complaint   Patient presents with    Skin Tags     Patient is coming in for skin tag removal.       Review of Systems   Constitutional:  Negative for fever and chills.   Respiratory:  Negative for cough and shortness of breath.        Objective:   Physical Exam   Constitutional: She appears well-developed and well-nourished. No distress.   Neurological: She is alert and oriented to person, place, and time. She is not disoriented.   Psychiatric: She has a normal mood and affect.   Skin:   Areas Examined (abnormalities noted in diagram):   Head / Face Inspection Performed  LUE Inspection Performed                 Diagram Legend     Erythematous scaling macule/papule c/w actinic keratosis       Vascular papule c/w angioma      Pigmented verrucoid papule/plaque c/w seborrheic keratosis      Yellow umbilicated papule c/w sebaceous hyperplasia      Irregularly shaped tan macule c/w lentigo     1-2 mm smooth white papules consistent with Milia      Movable subcutaneous cyst with punctum c/w epidermal inclusion cyst      Subcutaneous movable cyst c/w pilar cyst      Firm pink to brown papule c/w dermatofibroma      Pedunculated fleshy papule(s) c/w skin tag(s)      Evenly pigmented macule c/w junctional nevus     Mildly variegated pigmented, slightly irregular-bordered macule c/w mildly atypical nevus      Flesh colored to evenly pigmented papule c/w intradermal nevus       Pink pearly papule/plaque c/w basal cell carcinoma      Erythematous hyperkeratotic cursted plaque c/w SCC      Surgical scar with no sign of skin cancer recurrence      Open and closed comedones      Inflammatory papules and pustules      Verrucoid papule consistent consistent with wart     Erythematous eczematous patches and plaques     Dystrophic onycholytic nail with subungual debris c/w onychomycosis     Umbilicated papule    Erythematous-base heme-crusted  tan verrucoid plaque consistent with inflamed seborrheic keratosis     Erythematous Silvery Scaling Plaque c/w Psoriasis     See annotation      Assessment / Plan:        Skin tag  Verbal consent obtained. 6 lesions removed with scissor snip removal after anesthesia with 1% lidocaine with epinephrine. Hemostasis achieved with aluminum chloride and hyfrecation. No complications.             No follow-ups on file.

## 2024-03-05 NOTE — PATIENT INSTRUCTIONS

## 2024-03-18 DIAGNOSIS — L40.9 PSORIASIS: ICD-10-CM

## 2024-03-18 DIAGNOSIS — I10 ESSENTIAL HYPERTENSION: ICD-10-CM

## 2024-03-18 DIAGNOSIS — M25.50 ARTHRALGIA, UNSPECIFIED JOINT: ICD-10-CM

## 2024-03-18 DIAGNOSIS — L40.50 PSORIATIC ARTHRITIS: ICD-10-CM

## 2024-03-20 RX ORDER — SECUKINUMAB 150 MG/ML
300 INJECTION SUBCUTANEOUS
Qty: 2 ML | Refills: 5 | Status: ACTIVE | OUTPATIENT
Start: 2024-03-20 | End: 2024-07-22

## 2024-03-23 ENCOUNTER — OFFICE VISIT (OUTPATIENT)
Dept: URGENT CARE | Facility: CLINIC | Age: 54
End: 2024-03-23
Payer: COMMERCIAL

## 2024-03-23 VITALS
RESPIRATION RATE: 17 BRPM | HEART RATE: 78 BPM | BODY MASS INDEX: 47.29 KG/M2 | OXYGEN SATURATION: 98 % | SYSTOLIC BLOOD PRESSURE: 146 MMHG | HEIGHT: 62 IN | TEMPERATURE: 98 F | WEIGHT: 257 LBS | DIASTOLIC BLOOD PRESSURE: 73 MMHG

## 2024-03-23 DIAGNOSIS — K04.7 DENTAL INFECTION: Primary | ICD-10-CM

## 2024-03-23 PROCEDURE — 99213 OFFICE O/P EST LOW 20 MIN: CPT | Mod: S$GLB,,, | Performed by: NURSE PRACTITIONER

## 2024-03-23 RX ORDER — IBUPROFEN 600 MG/1
600 TABLET ORAL EVERY 6 HOURS PRN
Qty: 30 TABLET | Refills: 0 | Status: SHIPPED | OUTPATIENT
Start: 2024-03-23 | End: 2024-04-22

## 2024-03-23 RX ORDER — AMOXICILLIN 500 MG/1
500 CAPSULE ORAL 3 TIMES DAILY
Qty: 30 CAPSULE | Refills: 0 | Status: SHIPPED | OUTPATIENT
Start: 2024-03-23 | End: 2024-04-02

## 2024-03-23 NOTE — PROGRESS NOTES
"Subjective:      Patient ID: Oanh Marmolejo is a 53 y.o. female.    Vitals:  height is 5' 2" (1.575 m) and weight is 116.6 kg (257 lb). Her oral temperature is 97.8 °F (36.6 °C). Her blood pressure is 146/73 (abnormal) and her pulse is 78. Her respiration is 17 and oxygen saturation is 98%.     Chief Complaint: Dental Pain    Left 14y molar upper. Pain.     Dental Pain   This is a new problem. The current episode started in the past 7 days. The problem occurs constantly. The problem has been rapidly worsening. The pain is at a severity of 9/10. The pain is severe. Associated symptoms include facial pain, sinus pressure and thermal sensitivity.       HENT:  Positive for sinus pressure.       Objective:     Physical Exam   HENT:   Head: Normocephalic and atraumatic.   Nose: Nose normal.   Mouth/Throat: Mucous membranes are moist. Oropharynx is clear.      Comments: 14 y molar tender upper left. Slight redness laterally. No swelling or abscess noted.   Neck:      Comments: Mandibular adenopathy.    Abdominal: Normal appearance.   Neurological: She is alert.       Assessment:     1. Dental infection        Plan:       Dental infection    Other orders  -     ibuprofen (ADVIL,MOTRIN) 600 MG tablet; Take 1 tablet (600 mg total) by mouth every 6 (six) hours as needed for Pain.  Dispense: 30 tablet; Refill: 0  -     amoxicillin (AMOXIL) 500 MG capsule; Take 1 capsule (500 mg total) by mouth 3 (three) times daily. for 10 days  Dispense: 30 capsule; Refill: 0                  Patient Instructions   Follow up with the dentist Monday.    "

## 2024-04-11 ENCOUNTER — OFFICE VISIT (OUTPATIENT)
Dept: FAMILY MEDICINE | Facility: CLINIC | Age: 54
End: 2024-04-11
Payer: COMMERCIAL

## 2024-04-11 VITALS
HEART RATE: 122 BPM | TEMPERATURE: 101 F | SYSTOLIC BLOOD PRESSURE: 84 MMHG | WEIGHT: 242 LBS | DIASTOLIC BLOOD PRESSURE: 40 MMHG | BODY MASS INDEX: 44.53 KG/M2 | OXYGEN SATURATION: 96 % | HEIGHT: 62 IN

## 2024-04-11 DIAGNOSIS — B34.9 ACUTE VIRAL SYNDROME: Primary | ICD-10-CM

## 2024-04-11 DIAGNOSIS — L40.50 PSORIATIC ARTHRITIS: ICD-10-CM

## 2024-04-11 DIAGNOSIS — D84.9 IMMUNOSUPPRESSION: ICD-10-CM

## 2024-04-11 DIAGNOSIS — R50.9 FEVER, UNSPECIFIED FEVER CAUSE: ICD-10-CM

## 2024-04-11 DIAGNOSIS — I10 ESSENTIAL HYPERTENSION: ICD-10-CM

## 2024-04-11 LAB
CTP QC/QA: YES
CTP QC/QA: YES
POC MOLECULAR INFLUENZA A AGN: NEGATIVE
POC MOLECULAR INFLUENZA B AGN: NEGATIVE
SARS-COV-2 RDRP RESP QL NAA+PROBE: NEGATIVE

## 2024-04-11 PROCEDURE — 99214 OFFICE O/P EST MOD 30 MIN: CPT | Mod: S$GLB,,, | Performed by: FAMILY MEDICINE

## 2024-04-11 PROCEDURE — 87502 INFLUENZA DNA AMP PROBE: CPT | Mod: QW,,, | Performed by: FAMILY MEDICINE

## 2024-04-11 PROCEDURE — 3074F SYST BP LT 130 MM HG: CPT | Mod: CPTII,S$GLB,, | Performed by: FAMILY MEDICINE

## 2024-04-11 PROCEDURE — 1159F MED LIST DOCD IN RCRD: CPT | Mod: CPTII,S$GLB,, | Performed by: FAMILY MEDICINE

## 2024-04-11 PROCEDURE — 87635 SARS-COV-2 COVID-19 AMP PRB: CPT | Mod: QW,S$GLB,, | Performed by: FAMILY MEDICINE

## 2024-04-11 PROCEDURE — 4010F ACE/ARB THERAPY RXD/TAKEN: CPT | Mod: CPTII,S$GLB,, | Performed by: FAMILY MEDICINE

## 2024-04-11 PROCEDURE — 3078F DIAST BP <80 MM HG: CPT | Mod: CPTII,S$GLB,, | Performed by: FAMILY MEDICINE

## 2024-04-11 PROCEDURE — 3008F BODY MASS INDEX DOCD: CPT | Mod: CPTII,S$GLB,, | Performed by: FAMILY MEDICINE

## 2024-04-11 RX ORDER — PROMETHAZINE HYDROCHLORIDE 25 MG/1
25 TABLET ORAL EVERY 4 HOURS PRN
Qty: 20 TABLET | Refills: 0 | Status: SHIPPED | OUTPATIENT
Start: 2024-04-11 | End: 2024-04-16

## 2024-04-11 RX ORDER — NIRMATRELVIR AND RITONAVIR 300-100 MG
KIT ORAL
Qty: 30 TABLET | Refills: 0 | Status: SHIPPED | OUTPATIENT
Start: 2024-04-11 | End: 2024-04-16

## 2024-04-11 NOTE — LETTER
[address] ? [place], [address] ? Phone [phone] ? Fax [phone]           Return to Work/School    Patient: Oanh Marmolejo  YOB: 1970   Date: 04/11/2024      To Whom It May Concern:     Oanh Marmolejo was in contact with/seen in my office on 04/11/2024. COVID-19 is present in our communities across the state. Not all patients are eligible or appropriate to be tested. In this situation, your employee meets the following criteria:     Oanh Marmolejo has met the criteria for COVID-19 testing and has a NEGATIVE result. The employee can return to work once they are asymptomatic for 24 hours without the use of fever reducing medications (Tylenol, Motrin, etc).  Anticipate being excused 04/10/2024 through 4/14/24.     If you have any questions or concerns, or if I can be of further assistance, please do not hesitate to contact me.     Sincerely,     Oly Coronel MD

## 2024-04-11 NOTE — PATIENT INSTRUCTIONS
Hold blood pressure medication and hold Cosentyx   Alternate Tylenol and Motrin every 4 hours for fever.  Stay hydrated with broth, Pedialyte or Gatorade taking small sips at a time.  Phenergan given for nausea.  COVID therapy prescribed.  Hold crestor while taking paxlovid

## 2024-04-11 NOTE — PROGRESS NOTES
SUBJECTIVE:    Patient ID: Oanh Marmolejo is a 53 y.o. female.    Chief Complaint: Chills, Headache, Tremors, Emesis, Fever, and Fatigue (No bottles//Pt c/o chills, fever highest @101, tremors, vomiting, fatigue and headache x2 days. Denies congestion, sore throat, cough. Took tylenol and IBU//JL)    Patient with hypertension and psoriatic arthritis on immunosuppressive therapy presents with 2 days' history of upper respiratory and constitutional symptoms.  Fever today of 102.  Reports weakness and nausea.  Has headaches and sore throat.  Noted to be a tachycardic and hypotensive on today.  Has had limited oral intake due to n/v. Denies diarrhea. Denies sick contacts .      Office Visit on 04/11/2024   Component Date Value Ref Range Status    POC Molecular Influenza A Ag 04/11/2024 Negative  Negative Final    POC Molecular Influenza B Ag 04/11/2024 Negative  Negative Final     Acceptable 04/11/2024 Yes   Final    POC Rapid COVID 04/11/2024 Negative  Negative Final     Acceptable 04/11/2024 Yes   Final   Lab Visit on 02/20/2024   Component Date Value Ref Range Status    TB Gold Plus 02/20/2024 Negative  Negative Final    TB1 - Nil 02/20/2024 0.06  IU/mL Final    TB2 - Nil 02/20/2024 0.07  IU/mL Final    Mitogen - Nil 02/20/2024 8.99  IU/mL Final    Quantiferon Nil Value 02/20/2024 0.04  IU/mL Final   Lab Visit on 02/16/2024   Component Date Value Ref Range Status    Hep A IgM 02/16/2024 Negative  Negative Final    Hepatitis B Surface Ag 02/16/2024 Negative  Negative Final    Hep B C IgM 02/16/2024 Negative  Negative Final    Hepatitis C Ab 02/16/2024 Negative  Negative Final    WBC 02/16/2024 4.96  3.90 - 12.70 K/uL Final    RBC 02/16/2024 4.33  4.00 - 5.40 M/uL Final    Hemoglobin 02/16/2024 12.1  12.0 - 16.0 g/dL Final    Hematocrit 02/16/2024 36.8 (L)  37.0 - 48.5 % Final    MCV 02/16/2024 85  82 - 98 fL Final    MCH 02/16/2024 27.9  27.0 - 31.0 pg Final    [facility] [place] 02/16/2024 32.9   32.0 - 36.0 g/dL Final    RDW 02/16/2024 14.5  11.5 - 14.5 % Final    Platelets 02/16/2024 227  150 - 450 K/uL Final    MPV 02/16/2024 10.6  9.2 - 12.9 fL Final    Immature Granulocytes 02/16/2024 0.2  0.0 - 0.5 % Final    Gran # (ANC) 02/16/2024 3.3  1.8 - 7.7 K/uL Final    Immature Grans (Abs) 02/16/2024 0.01  0.00 - 0.04 K/uL Final    Lymph # 02/16/2024 1.3  1.0 - 4.8 K/uL Final    Mono # 02/16/2024 0.3  0.3 - 1.0 K/uL Final    Eos # 02/16/2024 0.1  0.0 - 0.5 K/uL Final    Baso # 02/16/2024 0.01  0.00 - 0.20 K/uL Final    nRBC 02/16/2024 0  0 /100 WBC Final    Gran % 02/16/2024 66.5  38.0 - 73.0 % Final    Lymph % 02/16/2024 25.2  18.0 - 48.0 % Final    Mono % 02/16/2024 6.5  4.0 - 15.0 % Final    Eosinophil % 02/16/2024 1.4  0.0 - 8.0 % Final    Basophil % 02/16/2024 0.2  0.0 - 1.9 % Final    Differential Method 02/16/2024 Automated   Final    Sodium 02/16/2024 139  136 - 145 mmol/L Final    Potassium 02/16/2024 3.9  3.5 - 5.1 mmol/L Final    Chloride 02/16/2024 105  95 - 110 mmol/L Final    CO2 02/16/2024 24  23 - 29 mmol/L Final    Glucose 02/16/2024 102  70 - 110 mg/dL Final    BUN 02/16/2024 16  6 - 20 mg/dL Final    Creatinine 02/16/2024 0.9  0.5 - 1.4 mg/dL Final    Calcium 02/16/2024 9.9  8.7 - 10.5 mg/dL Final    Total Protein 02/16/2024 7.6  6.0 - 8.4 g/dL Final    Albumin 02/16/2024 4.5  3.5 - 5.2 g/dL Final    Total Bilirubin 02/16/2024 1.5 (H)  0.1 - 1.0 mg/dL Final    Alkaline Phosphatase 02/16/2024 76  55 - 135 U/L Final    AST 02/16/2024 20  10 - 40 U/L Final    ALT 02/16/2024 24  10 - 44 U/L Final    eGFR 02/16/2024 >60  >60 mL/min/1.73 m^2 Final    Anion Gap 02/16/2024 10  8 - 16 mmol/L Final    Sed Rate 02/16/2024 15  0 - 20 mm/Hr Final    CRP 02/16/2024 2.1  0.0 - 8.2 mg/L Final   Lab Visit on 11/15/2023   Component Date Value Ref Range Status    Hemoglobin A1C 11/15/2023 5.4  4.5 - 6.2 % Final    Estimated Avg Glucose 11/15/2023 108  68 - 131 mg/dL Final    Free T4 11/15/2023 0.82  0.71 -  1.51 ng/dL Final    TSH 11/15/2023 2.048  0.400 - 4.000 uIU/mL Final   Lab Visit on 10/16/2023   Component Date Value Ref Range Status    WBC 10/16/2023 5.77  3.90 - 12.70 K/uL Final    RBC 10/16/2023 4.17  4.00 - 5.40 M/uL Final    Hemoglobin 10/16/2023 11.8 (L)  12.0 - 16.0 g/dL Final    Hematocrit 10/16/2023 35.9 (L)  37.0 - 48.5 % Final    MCV 10/16/2023 86  82 - 98 fL Final    MCH 10/16/2023 28.3  27.0 - 31.0 pg Final    MCHC 10/16/2023 32.9  32.0 - 36.0 g/dL Final    RDW 10/16/2023 14.8 (H)  11.5 - 14.5 % Final    Platelets 10/16/2023 253  150 - 450 K/uL Final    MPV 10/16/2023 10.4  9.2 - 12.9 fL Final    Immature Granulocytes 10/16/2023 0.3  0.0 - 0.5 % Final    Gran # (ANC) 10/16/2023 3.6  1.8 - 7.7 K/uL Final    Immature Grans (Abs) 10/16/2023 0.02  0.00 - 0.04 K/uL Final    Lymph # 10/16/2023 1.6  1.0 - 4.8 K/uL Final    Mono # 10/16/2023 0.5  0.3 - 1.0 K/uL Final    Eos # 10/16/2023 0.1  0.0 - 0.5 K/uL Final    Baso # 10/16/2023 0.01  0.00 - 0.20 K/uL Final    nRBC 10/16/2023 0  0 /100 WBC Final    Gran % 10/16/2023 62.5  38.0 - 73.0 % Final    Lymph % 10/16/2023 27.0  18.0 - 48.0 % Final    Mono % 10/16/2023 8.1  4.0 - 15.0 % Final    Eosinophil % 10/16/2023 1.9  0.0 - 8.0 % Final    Basophil % 10/16/2023 0.2  0.0 - 1.9 % Final    Differential Method 10/16/2023 Automated   Final    Sodium 10/16/2023 140  136 - 145 mmol/L Final    Potassium 10/16/2023 4.2  3.5 - 5.1 mmol/L Final    Chloride 10/16/2023 102  95 - 110 mmol/L Final    CO2 10/16/2023 23  23 - 29 mmol/L Final    Glucose 10/16/2023 107  70 - 110 mg/dL Final    BUN 10/16/2023 11  6 - 20 mg/dL Final    Creatinine 10/16/2023 0.9  0.5 - 1.4 mg/dL Final    Calcium 10/16/2023 9.9  8.7 - 10.5 mg/dL Final    Total Protein 10/16/2023 7.5  6.0 - 8.4 g/dL Final    Albumin 10/16/2023 4.3  3.5 - 5.2 g/dL Final    Total Bilirubin 10/16/2023 1.2 (H)  0.1 - 1.0 mg/dL Final    Alkaline Phosphatase 10/16/2023 75  55 - 135 U/L Final    AST 10/16/2023 22  10 - 40  U/L Final    ALT 10/16/2023 21  10 - 44 U/L Final    eGFR 10/16/2023 >60  >60 mL/min/1.73 m^2 Final    Anion Gap 10/16/2023 15  8 - 16 mmol/L Final    Sed Rate 10/16/2023 25 (H)  0 - 20 mm/Hr Final    CRP 10/16/2023 3.9  0.0 - 8.2 mg/L Final      Past Medical History:   Diagnosis Date    Arthritis     Back pain     Degenerative disc disease     LIZBETH (generalized anxiety disorder)     Gout flare     High triglycerides     History of gout     Hyperlipidemia     Hypertension     Joint pain     Obese     Pain of left calf     Psoriasis     Swelling of lower extremity      Past Surgical History:   Procedure Laterality Date    COLONOSCOPY N/A 7/29/2022    Procedure: COLONOSCOPY;  Surgeon: Francy Watson MD;  Location: [facility];  Service: Endoscopy;  Laterality: N/A;    ESOPHAGOGASTRODUODENOSCOPY N/A 7/29/2022    Procedure: EGD (ESOPHAGOGASTRODUODENOSCOPY);  Surgeon: Francy Watson MD;  Location: [facility];  Service: Endoscopy;  Laterality: N/A;    NECK SURGERY       Family History   Problem Relation Age of Onset    Diabetes Father 70    Heart disease Father     Hyperlipidemia Father     Hypertension Father     ALS Father     Cancer Maternal Aunt     Cancer Paternal Uncle     Diabetes Maternal Grandmother     Diabetes Paternal Grandmother     Breast cancer Mother 74    Melanoma Neg Hx     Psoriasis Neg Hx     Lupus Neg Hx     Eczema Neg Hx        Marital Status:   Alcohol History:  reports current alcohol use.  Tobacco History:  reports that she has never smoked. She has never used smokeless tobacco.  Drug History:  reports no history of drug use.    Review of patient's allergies indicates:   Allergen Reactions    Pistachio nut        Current Outpatient Medications:     allopurinoL (ZYLOPRIM) 300 MG tablet, Take 1 tablet (300 mg total) by mouth once daily., Disp: 90 tablet, Rfl: 3    COSENTYX PEN, 2 PENS, 150 mg/mL PnIj, Inject 2 pens (300 mg) into the skin every 30 days., Disp: 2 mL, Rfl: 5    ezetimibe  (ZETIA) 10 mg tablet, Take 1 tablet (10 mg total) by mouth once daily., Disp: 90 tablet, Rfl: 3    FLUoxetine 20 MG tablet, Take 1 tablet (20 mg total) by mouth once daily., Disp: 90 tablet, Rfl: 3    folic acid (FOLVITE) 1 MG tablet, Take 1 tablet (1 mg total) by mouth Daily., Disp: 90 tablet, Rfl: 3    ibuprofen (ADVIL,MOTRIN) 600 MG tablet, Take 1 tablet (600 mg total) by mouth every 6 (six) hours as needed for Pain., Disp: 30 tablet, Rfl: 0    lisinopriL-hydrochlorothiazide (PRINZIDE,ZESTORETIC) 20-12.5 mg per tablet, Take 1 tablet by mouth once daily., Disp: 90 tablet, Rfl: 3    rosuvastatin (CRESTOR) 20 MG tablet, Take 1 tablet (20 mg total) by mouth once daily., Disp: 90 tablet, Rfl: 3    SEMAGLUTIDE SUBQ, Inject into the skin., Disp: , Rfl:     nirmatrelvir-ritonavir (PAXLOVID) 300 mg (150 mg x 2)-100 mg copackaged tablets (EUA), Take 3 tablets by mouth 2 (two) times daily. Each dose contains 2 nirmatrelvir (pink tablets) and 1 ritonavir (white tablet). Take all 3 tablets together, Disp: 30 tablet, Rfl: 0    promethazine (PHENERGAN) 25 MG tablet, Take 1 tablet (25 mg total) by mouth every 4 (four) hours as needed for Nausea., Disp: 20 tablet, Rfl: 0    Review of Systems   Constitutional:  Positive for chills, fatigue and fever. Negative for activity change and unexpected weight change.   HENT:  Positive for congestion and sore throat. Negative for hearing loss, postnasal drip, sinus pressure and voice change.    Eyes:  Negative for photophobia and visual disturbance.   Respiratory:  Negative for cough, shortness of breath and wheezing.    Cardiovascular:  Negative for chest pain and palpitations.   Gastrointestinal:  Positive for nausea. Negative for constipation and diarrhea.   Genitourinary:  Negative for difficulty urinating, frequency, hematuria and urgency.   Musculoskeletal:  Positive for myalgias. Negative for arthralgias and back pain.   Skin:  Negative for rash.   Neurological:  Negative for  "weakness, light-headedness and headaches.   Hematological:  Negative for adenopathy. Does not bruise/bleed easily.   Psychiatric/Behavioral:  The patient is not nervous/anxious.           Objective:          3/23/2024     4:41 PM 4/11/2024    10:53 AM   Vitals - 1 value per visit   SYSTOLIC 146 84   DIASTOLIC 73 40   Pulse 78 122   Temp 97.8 °F (36.6 °C) 101.4 °F (38.6 °C)   Resp 17    SPO2 98 % 96 %   Weight (lb) 257 242   Weight (kg) 116.574 109.77   Height 5' 2" (1.575 m) 5' 2" (1.575 m)   BMI (Calculated) 47 44.3   Pain Score Nine Seven      Physical Exam  Constitutional:       Appearance: Normal appearance.   HENT:      Head: Normocephalic and atraumatic.      Right Ear: Tympanic membrane normal.      Left Ear: Tympanic membrane normal.      Mouth/Throat:      Mouth: Mucous membranes are moist.      Pharynx: No oropharyngeal exudate or posterior oropharyngeal erythema.   Eyes:      Conjunctiva/sclera: Conjunctivae normal.   Cardiovascular:      Rate and Rhythm: Tachycardia present.      Heart sounds: Normal heart sounds.   Pulmonary:      Effort: Pulmonary effort is normal. No respiratory distress.      Breath sounds: Normal breath sounds. No wheezing.   Skin:     Findings: No rash.      Comments: Faced flushed   Neurological:      General: No focal deficit present.      Mental Status: She is alert and oriented to person, place, and time.   Psychiatric:         Mood and Affect: Mood normal.         Behavior: Behavior normal.           Assessment:       1. Acute viral syndrome    2. Fever, unspecified fever cause    3. Essential hypertension    4. Psoriatic arthritis    5. Immunosuppression         Plan:       Acute viral syndrome  -     nirmatrelvir-ritonavir (PAXLOVID) 300 mg (150 mg x 2)-100 mg copackaged tablets (EUA); Take 3 tablets by mouth 2 (two) times daily. Each dose contains 2 nirmatrelvir (pink tablets) and 1 ritonavir (white tablet). Take all 3 tablets together  Dispense: 30 tablet; Refill: 0  -     " promethazine (PHENERGAN) 25 MG tablet; Take 1 tablet (25 mg total) by mouth every 4 (four) hours as needed for Nausea.  Dispense: 20 tablet; Refill: 0    Fever, unspecified fever cause  -     POCT Influenza A/B Molecular  -     POCT COVID-19 Rapid Screening  Tests are negative however patient exhibits symptoms and will be treated empirically as COVID-19    Essential hypertension  Hold BP meds    Psoriatic arthritis  Hold cosentyx   Immunosuppression      Medication List with Changes/Refills   New Medications    NIRMATRELVIR-RITONAVIR (PAXLOVID) 300 MG (150 MG X 2)-100 MG COPACKAGED TABLETS (EUA)    Take 3 tablets by mouth 2 (two) times daily. Each dose contains 2 nirmatrelvir (pink tablets) and 1 ritonavir (white tablet). Take all 3 tablets together    PROMETHAZINE (PHENERGAN) 25 MG TABLET    Take 1 tablet (25 mg total) by mouth every 4 (four) hours as needed for Nausea.   Current Medications    ALLOPURINOL (ZYLOPRIM) 300 MG TABLET    Take 1 tablet (300 mg total) by mouth once daily.    COSENTYX PEN, 2 PENS, 150 MG/ML PNIJ    Inject 2 pens (300 mg) into the skin every 30 days.    EZETIMIBE (ZETIA) 10 MG TABLET    Take 1 tablet (10 mg total) by mouth once daily.    FLUOXETINE 20 MG TABLET    Take 1 tablet (20 mg total) by mouth once daily.    FOLIC ACID (FOLVITE) 1 MG TABLET    Take 1 tablet (1 mg total) by mouth Daily.    IBUPROFEN (ADVIL,MOTRIN) 600 MG TABLET    Take 1 tablet (600 mg total) by mouth every 6 (six) hours as needed for Pain.    LISINOPRIL-HYDROCHLOROTHIAZIDE (PRINZIDE,ZESTORETIC) 20-12.5 MG PER TABLET    Take 1 tablet by mouth once daily.    ROSUVASTATIN (CRESTOR) 20 MG TABLET    Take 1 tablet (20 mg total) by mouth once daily.    SEMAGLUTIDE SUBQ    Inject into the skin.      Follow up in about 4 months (around 8/11/2024) for HTN.      t

## 2024-04-23 ENCOUNTER — OFFICE VISIT (OUTPATIENT)
Dept: RHEUMATOLOGY | Facility: CLINIC | Age: 54
End: 2024-04-23
Payer: COMMERCIAL

## 2024-04-23 VITALS
HEART RATE: 76 BPM | SYSTOLIC BLOOD PRESSURE: 128 MMHG | HEIGHT: 62 IN | DIASTOLIC BLOOD PRESSURE: 75 MMHG | WEIGHT: 244.69 LBS | BODY MASS INDEX: 45.03 KG/M2

## 2024-04-23 DIAGNOSIS — L40.50 PSORIATIC ARTHRITIS: ICD-10-CM

## 2024-04-23 DIAGNOSIS — D84.9 IMMUNOSUPPRESSION: ICD-10-CM

## 2024-04-23 DIAGNOSIS — L40.9 PSORIASIS: Primary | ICD-10-CM

## 2024-04-23 PROCEDURE — 1159F MED LIST DOCD IN RCRD: CPT | Mod: CPTII,S$GLB,, | Performed by: INTERNAL MEDICINE

## 2024-04-23 PROCEDURE — 3078F DIAST BP <80 MM HG: CPT | Mod: CPTII,S$GLB,, | Performed by: INTERNAL MEDICINE

## 2024-04-23 PROCEDURE — 4010F ACE/ARB THERAPY RXD/TAKEN: CPT | Mod: CPTII,S$GLB,, | Performed by: INTERNAL MEDICINE

## 2024-04-23 PROCEDURE — 99214 OFFICE O/P EST MOD 30 MIN: CPT | Mod: S$GLB,,, | Performed by: INTERNAL MEDICINE

## 2024-04-23 PROCEDURE — 3008F BODY MASS INDEX DOCD: CPT | Mod: CPTII,S$GLB,, | Performed by: INTERNAL MEDICINE

## 2024-04-23 PROCEDURE — 3074F SYST BP LT 130 MM HG: CPT | Mod: CPTII,S$GLB,, | Performed by: INTERNAL MEDICINE

## 2024-04-23 PROCEDURE — 99999 PR PBB SHADOW E&M-EST. PATIENT-LVL III: CPT | Mod: PBBFAC,,, | Performed by: INTERNAL MEDICINE

## 2024-04-23 RX ORDER — FLUOCINONIDE TOPICAL SOLUTION USP, 0.05% 0.5 MG/ML
SOLUTION TOPICAL 2 TIMES DAILY
Qty: 60 ML | Refills: 2 | Status: SHIPPED | OUTPATIENT
Start: 2024-04-23

## 2024-04-23 RX ORDER — CIPROFLOXACIN AND DEXAMETHASONE 3; 1 MG/ML; MG/ML
4 SUSPENSION/ DROPS AURICULAR (OTIC) 2 TIMES DAILY
Qty: 7.5 ML | Refills: 0 | Status: SHIPPED | OUTPATIENT
Start: 2024-04-23 | End: 2024-04-30

## 2024-04-23 ASSESSMENT — ROUTINE ASSESSMENT OF PATIENT INDEX DATA (RAPID3)
MDHAQ FUNCTION SCORE: 0.2
PSYCHOLOGICAL DISTRESS SCORE: 0
PATIENT GLOBAL ASSESSMENT SCORE: 0.5
FATIGUE SCORE: 1.1
PAIN SCORE: 1
TOTAL RAPID3 SCORE: 0.72

## 2024-04-23 NOTE — PROGRESS NOTES
RHEUMATOLOGY CLINIC FOLLOW UP VISIT      Chief complaints:- management of PsA       HPI:-  Oanh Bliss a 53 y.o. pleasant female with PMH of PsA ( on Cosentyx) and gout comes in for an initial visit with above chief complaints.   Left middle PIP joint with stiffness and swelling slightly improved this visit.  Present over 1 year  stiffness and +pain. Persists despite therapy.   Rheumatological history  Patient was diagnosed with PsA in   Was previously on Cosentyx was discontinued it since quarantine due to COVID concerns.  Failed Enbrel, Humira, MTX (tolerated but ineffective) .   Previously following Dr. Zack West (rheumatologist, [place])  Initial was gout as youth, then Ps, and wandering oligoarticular polyarthritis.     Cosentyx was discontinued around 2020 due to COVID concern.  She has been back on Cosentyx  300mg every 28 days since approx 2021    Ibuprofen 800mg PRN.      Pain /10, toes. ? If this is gout.     Fhx:  No psoriasis, thyroid disease, or IBD.      Tobacco (denies), EtOH (social), recreational/illicit drugs (social)     Occupation: Admin     Denies Hx of clots/miscarriages -       ROS: Denies any mouth ulcers, Raynaud's,  photosensitivity, unintentional weight loss, vision changes, SOB, CP, joint swelling, arthralgia, myalgia, urinary/bowel symptoms, N/V, fever/chills, Sicca symptoms, recent travels/sick contacts, depression, insomnia, hair loss    Diffused psoriasis - all over.  Currently on OTC cortisone cream for the itch. Bilateral ankle and L 3rd digit joint swell.  Diffused arthralgia.   Hyperpigmentation forearms not new but present for years.       Review of Systems   Constitutional:  Negative for chills, diaphoresis, fever, malaise/fatigue and weight loss.   HENT:  Negative for congestion, ear discharge, ear pain, hearing loss, nosebleeds, sinus pain and tinnitus.    Eyes:  Negative for photophobia, pain, discharge and redness.   Respiratory:   Negative for cough, hemoptysis, sputum production, shortness of breath, wheezing and stridor.    Cardiovascular:  Negative for chest pain, palpitations, orthopnea, claudication, leg swelling and PND.   Gastrointestinal:  Negative for abdominal pain, constipation, diarrhea, heartburn, nausea and vomiting.   Genitourinary:  Negative for dysuria, frequency, hematuria and urgency.   Musculoskeletal:  Negative for back pain, joint pain, myalgias and neck pain.   Skin:  Positive for itching and rash.   Neurological:  Negative for dizziness, tingling, tremors, weakness and headaches.   Endo/Heme/Allergies:  Does not bruise/bleed easily.   Psychiatric/Behavioral:  Negative for depression, hallucinations and suicidal ideas. The patient is not nervous/anxious and does not have insomnia.        Past Medical History:   Diagnosis Date    Arthritis     Back pain     Degenerative disc disease     LIZBETH (generalized anxiety disorder)     Gout flare     High triglycerides     History of gout     Hyperlipidemia     Hypertension     Joint pain     Obese     Pain of left calf     Psoriasis     Swelling of lower extremity        Past Surgical History:   Procedure Laterality Date    COLONOSCOPY N/A 7/29/2022    Procedure: COLONOSCOPY;  Surgeon: Francy Watson MD;  Location: [facility];  Service: Endoscopy;  Laterality: N/A;    ESOPHAGOGASTRODUODENOSCOPY N/A 7/29/2022    Procedure: EGD (ESOPHAGOGASTRODUODENOSCOPY);  Surgeon: Francy Watson MD;  Location: [facility];  Service: Endoscopy;  Laterality: N/A;    NECK SURGERY          Social History     Tobacco Use    Smoking status: Never    Smokeless tobacco: Never   Substance Use Topics    Alcohol use: Yes    Drug use: No       Family History   Problem Relation Name Age of Onset    Diabetes Father  70    Heart disease Father      Hyperlipidemia Father      Hypertension Father      ALS Father      Cancer Maternal Aunt      Cancer Paternal Uncle      Diabetes Maternal Grandmother       "Diabetes Paternal Grandmother      Breast cancer Mother  74    Melanoma Neg Hx      Psoriasis Neg Hx      Lupus Neg Hx      Eczema Neg Hx         Review of patient's allergies indicates:   Allergen Reactions    Pistachio nut        Vitals:    04/23/24 1611   BP: 128/75   Pulse: 76   Weight: 111 kg (244 lb 11.4 oz)   Height: 5' 2" (1.575 m)   PainSc:   1   PainLoc: Back       Physical Exam  Constitutional:       Comments: Obese    HENT:      Head: Normocephalic and atraumatic.   Eyes:      Conjunctiva/sclera: Conjunctivae normal.      Pupils: Pupils are equal, round, and reactive to light.   Cardiovascular:      Rate and Rhythm: Normal rate and regular rhythm.      Heart sounds: Normal heart sounds.   Pulmonary:      Effort: Pulmonary effort is normal.      Breath sounds: Normal breath sounds.   Abdominal:      General: Bowel sounds are normal.      Palpations: Abdomen is soft.   Musculoskeletal:         General: Normal range of motion.      Cervical back: Normal range of motion and neck supple.      Comments: Bilateral ankle synovitis - improvement   L 3rd digit dactylitis      Skin:     General: Skin is warm and dry.      Comments: Diffused large psoriatic plaques in bilateral UE/LE, scalp, chest, back, ears - improving from last time      Neurological:      Mental Status: She is alert and oriented to person, place, and time.      Gait: Gait is intact.   Psychiatric:         Mood and Affect: Mood and affect normal.         Cognition and Memory: Memory normal.         Judgment: Judgment normal.               Medication List with Changes/Refills   New Medications    CIPROFLOXACIN-DEXAMETHASONE 0.3-0.1% (CIPRODEX) 0.3-0.1 % DRPS    Place 4 drops into the right ear 2 (two) times daily. for 7 days    FLUOCINONIDE (LIDEX) 0.05 % EXTERNAL SOLUTION    Apply topically 2 (two) times daily.   Current Medications    ALLOPURINOL (ZYLOPRIM) 300 MG TABLET    Take 1 tablet (300 mg total) by mouth once daily.    COSENTYX PEN, 2 " PENS, 150 MG/ML PNIJ    Inject 2 pens (300 mg) into the skin every 30 days.    EZETIMIBE (ZETIA) 10 MG TABLET    Take 1 tablet (10 mg total) by mouth once daily.    FLUOXETINE 20 MG TABLET    Take 1 tablet (20 mg total) by mouth once daily.    FOLIC ACID (FOLVITE) 1 MG TABLET    Take 1 tablet (1 mg total) by mouth Daily.    LISINOPRIL-HYDROCHLOROTHIAZIDE (PRINZIDE,ZESTORETIC) 20-12.5 MG PER TABLET    Take 1 tablet by mouth once daily.    ROSUVASTATIN (CRESTOR) 20 MG TABLET    Take 1 tablet (20 mg total) by mouth once daily.    SEMAGLUTIDE SUBQ    Inject into the skin.     There are no diagnoses linked to this encounter.    Assessment/Plans:-  52 y.o. pleasant female with PMH of PsA (previously on Cosentyx) and gout comes in for f/u for management of PsA.    Patient previously on Cosentyx but discontinued since March/April due to COVID scare is coming in today to establish care and experiencing PsA flare.     Had failed multiple medications in the past - Enbrel, Humira, and MTX.      Plan  - cbc, cmp, sed and c-reactive protein every 3 months. ad failed topical clobetasol in the past  - Ibuprofen 800mg TID PRN for arthralgia - to be taken with food   - Cosentyx 300mg 2 pens -continue   -right 2nd MTP discussed trying     TB 2022 neg  Hepatitis 2022 neg.     Otitis externa with ps: Start today.Cipro (antibiotic) mixed with steroids - start with this small bottle twice daily for 7 days.    Dont' t start until Cipro is done. Maintenance for scalp and ear psoriasis:   Lidex is a large bottle to keep to manage both ears and scalp through out the year.     Follow up in about 6 months (around 10/23/2024).       40min consultation with greater than 50% of that time included Preparing to see the patient (review records, tests), Obtaining and/or reviewing separately obtained historical data, Performing a medically appropriate examination and/or evaluation , Ordering medications, tests, and/or procedures, Referring and  communicating with other healthcare professionals , Documenting clinical information in the electronic or other health record and Independently interpreting results  (as warranted) & communicating results to the patient/family/caregiver. All questions answered.

## 2024-04-23 NOTE — PATIENT INSTRUCTIONS
Start today.Cipro (antibiotic) mixed with steroids - start with this small bottle twice daily for 7 days.    Dont' t start until Cipro is done. Maintenance for scalp and ear psoriasis:   Lidex is a large bottle to keep to manage both ears and scalp through out the year.

## 2024-04-26 ENCOUNTER — PATIENT MESSAGE (OUTPATIENT)
Dept: FAMILY MEDICINE | Facility: CLINIC | Age: 54
End: 2024-04-26
Payer: COMMERCIAL

## 2024-06-10 ENCOUNTER — HOSPITAL ENCOUNTER (OUTPATIENT)
Dept: RADIOLOGY | Facility: CLINIC | Age: 54
Discharge: HOME | End: 2024-06-10
Attending: PODIATRIST
Payer: COMMERCIAL

## 2024-06-10 ENCOUNTER — OFFICE VISIT (OUTPATIENT)
Dept: PODIATRY | Facility: CLINIC | Age: 54
End: 2024-06-10
Payer: COMMERCIAL

## 2024-06-10 VITALS — HEIGHT: 62 IN | WEIGHT: 248.69 LBS | BODY MASS INDEX: 45.76 KG/M2

## 2024-06-10 DIAGNOSIS — M79.671 PAIN OF RIGHT HEEL: ICD-10-CM

## 2024-06-10 DIAGNOSIS — M76.61 ACHILLES TENDINITIS OF RIGHT LOWER EXTREMITY: Primary | ICD-10-CM

## 2024-06-10 PROCEDURE — 99203 OFFICE O/P NEW LOW 30 MIN: CPT | Mod: S$GLB,,, | Performed by: PODIATRIST

## 2024-06-10 PROCEDURE — 73630 X-RAY EXAM OF FOOT: CPT | Mod: RT,S$GLB,, | Performed by: RADIOLOGY

## 2024-06-10 PROCEDURE — 4010F ACE/ARB THERAPY RXD/TAKEN: CPT | Mod: CPTII,S$GLB,, | Performed by: PODIATRIST

## 2024-06-10 PROCEDURE — 1159F MED LIST DOCD IN RCRD: CPT | Mod: CPTII,S$GLB,, | Performed by: PODIATRIST

## 2024-06-10 PROCEDURE — 1160F RVW MEDS BY RX/DR IN RCRD: CPT | Mod: CPTII,S$GLB,, | Performed by: PODIATRIST

## 2024-06-10 PROCEDURE — 3008F BODY MASS INDEX DOCD: CPT | Mod: CPTII,S$GLB,, | Performed by: PODIATRIST

## 2024-06-10 PROCEDURE — 99999 PR PBB SHADOW E&M-EST. PATIENT-LVL III: CPT | Mod: PBBFAC,,, | Performed by: PODIATRIST

## 2024-06-10 RX ORDER — METHYLPREDNISOLONE 4 MG/1
TABLET ORAL
Qty: 21 EACH | Refills: 0 | Status: SHIPPED | OUTPATIENT
Start: 2024-06-10 | End: 2024-07-01

## 2024-06-10 NOTE — PROGRESS NOTES
"  [address] [facility] [address] [place] [address]  Phone: [phone]   [phone]    Patient's PCP:Oly Coronel MD  Referring Provider: Aaareferral Self    Subjective:      Chief Complaint:: Heel Pain (Right heel pain has a lump painful when does activity an walking 10/10 when sitting pain varies )    IVETTE Marmolejo is a 54 y.o. female who presents today with a complaint of right heel pain with a knoot on back of heel . The current episode started 1 month ago .  The symptoms include pain with activity an walking . Probable cause of complaint unknown, no history of trauma.  The symptoms are aggravated by walking. The problem has worsened. Treatment to date have included elevation  which provided some relief.       Vitals:    06/10/24 1355   Weight: 112.8 kg (248 lb 10.9 oz)   Height: 5' 2" (1.575 m)   PainSc:   4   PainLoc: Foot      Shoe Size: 9    Past Surgical History:   Procedure Laterality Date    COLONOSCOPY N/A 7/29/2022    Procedure: COLONOSCOPY;  Surgeon: Francy Watson MD;  Location: [facility];  Service: Endoscopy;  Laterality: N/A;    ESOPHAGOGASTRODUODENOSCOPY N/A 7/29/2022    Procedure: EGD (ESOPHAGOGASTRODUODENOSCOPY);  Surgeon: Francy Watson MD;  Location: [facility];  Service: Endoscopy;  Laterality: N/A;    NECK SURGERY       Past Medical History:   Diagnosis Date    Arthritis     Back pain     Degenerative disc disease     LIZBETH (generalized anxiety disorder)     Gout flare     High triglycerides     History of gout     Hyperlipidemia     Hypertension     Joint pain     Obese     Pain of left calf     Psoriasis     Swelling of lower extremity      Family History   Problem Relation Name Age of Onset    Diabetes Father  70    Heart disease Father      Hyperlipidemia Father      Hypertension Father      ALS Father      Cancer Maternal Aunt      Cancer Paternal Uncle      Diabetes Maternal Grandmother      Diabetes Paternal Grandmother      Breast cancer Mother  74    " Melanoma Neg Hx      Psoriasis Neg Hx      Lupus Neg Hx      Eczema Neg Hx          Social History:   Marital Status:   Alcohol History:  reports current alcohol use.  Tobacco History:  reports that she has never smoked. She has never used smokeless tobacco.  Drug History:  reports no history of drug use.    Review of patient's allergies indicates:   Allergen Reactions    Pistachio nut        Current Outpatient Medications   Medication Sig Dispense Refill    allopurinoL (ZYLOPRIM) 300 MG tablet Take 1 tablet (300 mg total) by mouth once daily. 90 tablet 3    COSENTYX PEN, 2 PENS, 150 mg/mL PnIj Inject 2 pens (300 mg) into the skin every 30 days. 2 mL 5    ezetimibe (ZETIA) 10 mg tablet Take 1 tablet (10 mg total) by mouth once daily. 90 tablet 3    fluocinonide (LIDEX) 0.05 % external solution Apply topically 2 (two) times daily. 60 mL 2    FLUoxetine 20 MG tablet Take 1 tablet (20 mg total) by mouth once daily. 90 tablet 3    folic acid (FOLVITE) 1 MG tablet Take 1 tablet (1 mg total) by mouth Daily. 90 tablet 3    lisinopriL-hydrochlorothiazide (PRINZIDE,ZESTORETIC) 20-12.5 mg per tablet Take 1 tablet by mouth once daily. 90 tablet 3    rosuvastatin (CRESTOR) 20 MG tablet Take 1 tablet (20 mg total) by mouth once daily. 90 tablet 3    SEMAGLUTIDE SUBQ Inject into the skin.      methylPREDNISolone (MEDROL DOSEPACK) 4 mg tablet use as directed 21 each 0     No current facility-administered medications for this visit.       Review of Systems   Constitutional:  Negative for chills, fatigue, fever and unexpected weight change.   HENT:  Negative for hearing loss and trouble swallowing.    Eyes:  Negative for photophobia and visual disturbance.   Respiratory:  Negative for cough, shortness of breath and wheezing.    Cardiovascular:  Negative for chest pain, palpitations and leg swelling.   Gastrointestinal:  Negative for abdominal pain and nausea.   Genitourinary:  Negative for dysuria and frequency.    Musculoskeletal:  Positive for myalgias. Negative for arthralgias, back pain, gait problem and joint swelling.   Skin:  Negative for rash and wound.   Neurological:  Negative for tremors, seizures, speech difficulty, weakness and headaches.   Hematological:  Does not bruise/bleed easily.         Objective:        Physical Exam:   Foot Exam    General  General Appearance: appears stated age and healthy   Orientation: alert and oriented to person, place, and time   Affect: appropriate   Gait: antalgic       Right Foot/Ankle     Inspection and Palpation  Ecchymosis: none  Tenderness: (Distal Achilles tendon)  Swelling: (Mild edema in the midsubstance of the distal Achilles tendon)  Arch: normal  Skin Exam: skin intact; no drainage, no ulcer and no erythema   Neurovascular  Dorsalis pedis: 2+  Posterior tibial: 2+  Capillary Refill: 2+  Varicose veins: not present  Saphenous nerve sensation: normal  Tibial nerve sensation: normal  Superficial peroneal nerve sensation: normal  Deep peroneal nerve sensation: normal  Sural nerve sensation: normal    Edema  Type of edema: non-pitting    Muscle Strength  Ankle dorsiflexion: 5  Ankle plantar flexion: 5  Ankle inversion: 5  Ankle eversion: 5  Great toe extension: 5  Great toe flexion: 5    Range of Motion    Passive  Ankle dorsiflexion: 5      Tests  Anterior drawer: negative   Talar tilt: negative   PT Tinel's sign: negative    Paresthesia: negative        Physical Exam  Cardiovascular:      Pulses:           Dorsalis pedis pulses are 2+ on the right side.        Posterior tibial pulses are 2+ on the right side.   Feet:      Right foot:      Skin integrity: No ulcer or erythema.                   Muscle Strength   Right Lower Extremity   Ankle Dorsiflexion:  5   Plantar flexion:  5/5    Vascular Exam     Right Pulses  Dorsalis Pedis:      2+  Posterior Tibial:      2+           Imaging: X-Ray Foot Complete Right  EXAMINATION:  XR FOOT COMPLETE 3 VIEW RIGHT    CLINICAL  HISTORY:  . Pain in right foot    TECHNIQUE:  AP, lateral, and oblique views of the right foot were performed.    COMPARISON:  None    FINDINGS:  No acute fracture.  Small calcaneal spurs.  No malalignment.  Mild forefoot degenerative change.    Electronically signed by: Sheldon Ludwig  Date:    06/10/2024  Time:    14:26               Assessment:       1. Achilles tendinitis of right lower extremity    2. Pain of right heel      Plan:   Achilles tendinitis of right lower extremity  -     methylPREDNISolone (MEDROL DOSEPACK) 4 mg tablet; use as directed  Dispense: 21 each; Refill: 0  -     AIR CAST WALKER BOOT FOR HOME USE    Pain of right heel  -     X-Ray Foot Complete Right  -     methylPREDNISolone (MEDROL DOSEPACK) 4 mg tablet; use as directed  Dispense: 21 each; Refill: 0  -     AIR CAST WALKER BOOT FOR HOME USE      Follow up if symptoms worsen or fail to improve.    Procedures        Discussed treatment of Achilles tendonitis with rest, ice, oral NSAID, topical antiinflammatory creams, heel lifts, no barefoot, and cam walker boot if needed. Explained the importance of proper stretching. Discussed possibility of MRI for further evaluation if symptoms do not improve.     I did discuss with the patient that given the location of her pain and swelling in the tendon there is potential risk for rupture of the tendon if she does not adequately rest and protected.  Given this, I am going to place her in a Cam Walker boot.  I am also going to give her a Medrol Dosepak.      CAM walker boot with weight bearing  Ice to painful area, 15 minutes at a time  Ace-wrap to help with swelling  No barefoot   Keep affected extremity elevated while seated      Counseling:     I provided patient education verbally regarding:   Patient diagnosis, treatment options, as well as alternatives, risks, and benefits.     This note was created using Dragon voice recognition software that occasionally misinterpreted phrases or words.

## 2024-07-31 ENCOUNTER — TELEPHONE (OUTPATIENT)
Dept: FAMILY MEDICINE | Facility: CLINIC | Age: 54
End: 2024-07-31
Payer: COMMERCIAL

## 2024-07-31 DIAGNOSIS — E66.01 CLASS 3 SEVERE OBESITY WITH SERIOUS COMORBIDITY AND BODY MASS INDEX (BMI) OF 45.0 TO 49.9 IN ADULT, UNSPECIFIED OBESITY TYPE: ICD-10-CM

## 2024-07-31 DIAGNOSIS — E78.2 MIXED HYPERLIPIDEMIA: ICD-10-CM

## 2024-07-31 DIAGNOSIS — D84.9 IMMUNOSUPPRESSION: ICD-10-CM

## 2024-07-31 DIAGNOSIS — I10 ESSENTIAL HYPERTENSION: Primary | ICD-10-CM

## 2024-07-31 DIAGNOSIS — M10.9 GOUT, UNSPECIFIED CAUSE, UNSPECIFIED CHRONICITY, UNSPECIFIED SITE: ICD-10-CM

## 2024-07-31 NOTE — TELEPHONE ENCOUNTER
Spoke with patient notified fasting lab orders available at [facility].  Patient instructed to complete labs one week prior to visit.  Informed patient to return call with questions/concerns.  Patient verbalized understanding to all -DN

## 2024-08-06 ENCOUNTER — LAB VISIT (OUTPATIENT)
Dept: LAB | Facility: HOSPITAL | Age: 54
End: 2024-08-06
Attending: FAMILY MEDICINE
Payer: COMMERCIAL

## 2024-08-06 DIAGNOSIS — I10 ESSENTIAL HYPERTENSION: ICD-10-CM

## 2024-08-06 DIAGNOSIS — E66.01 CLASS 3 SEVERE OBESITY WITH SERIOUS COMORBIDITY AND BODY MASS INDEX (BMI) OF 45.0 TO 49.9 IN ADULT, UNSPECIFIED OBESITY TYPE: ICD-10-CM

## 2024-08-06 DIAGNOSIS — E78.2 MIXED HYPERLIPIDEMIA: ICD-10-CM

## 2024-08-06 DIAGNOSIS — M10.9 GOUT, UNSPECIFIED CAUSE, UNSPECIFIED CHRONICITY, UNSPECIFIED SITE: ICD-10-CM

## 2024-08-06 LAB
ALBUMIN SERPL BCP-MCNC: 4.2 G/DL (ref 3.5–5.2)
ALP SERPL-CCNC: 69 U/L (ref 55–135)
ALT SERPL W/O P-5'-P-CCNC: 22 U/L (ref 10–44)
ANION GAP SERPL CALC-SCNC: 11 MMOL/L (ref 8–16)
AST SERPL-CCNC: 22 U/L (ref 10–40)
BILIRUB SERPL-MCNC: 1.2 MG/DL (ref 0.1–1)
BUN SERPL-MCNC: 16 MG/DL (ref 6–20)
CALCIUM SERPL-MCNC: 9.9 MG/DL (ref 8.7–10.5)
CHLORIDE SERPL-SCNC: 103 MMOL/L (ref 95–110)
CHOLEST SERPL-MCNC: 116 MG/DL (ref 120–199)
CHOLEST/HDLC SERPL: 3.5 {RATIO} (ref 2–5)
CO2 SERPL-SCNC: 24 MMOL/L (ref 23–29)
CREAT SERPL-MCNC: 0.9 MG/DL (ref 0.5–1.4)
EST. GFR  (NO RACE VARIABLE): >60 ML/MIN/1.73 M^2
GLUCOSE SERPL-MCNC: 105 MG/DL (ref 70–110)
HDLC SERPL-MCNC: 33 MG/DL (ref 40–75)
HDLC SERPL: 28.4 % (ref 20–50)
LDLC SERPL CALC-MCNC: 41.6 MG/DL (ref 63–159)
NONHDLC SERPL-MCNC: 83 MG/DL
POTASSIUM SERPL-SCNC: 4.1 MMOL/L (ref 3.5–5.1)
PROT SERPL-MCNC: 7.3 G/DL (ref 6–8.4)
SODIUM SERPL-SCNC: 138 MMOL/L (ref 136–145)
TRIGL SERPL-MCNC: 207 MG/DL (ref 30–150)
URATE SERPL-MCNC: 5.5 MG/DL (ref 2.4–5.7)

## 2024-08-06 PROCEDURE — 36415 COLL VENOUS BLD VENIPUNCTURE: CPT | Performed by: FAMILY MEDICINE

## 2024-08-06 PROCEDURE — 80053 COMPREHEN METABOLIC PANEL: CPT | Performed by: FAMILY MEDICINE

## 2024-08-06 PROCEDURE — 80061 LIPID PANEL: CPT | Performed by: FAMILY MEDICINE

## 2024-08-06 PROCEDURE — 84550 ASSAY OF BLOOD/URIC ACID: CPT | Performed by: FAMILY MEDICINE

## 2024-08-07 ENCOUNTER — PATIENT MESSAGE (OUTPATIENT)
Dept: ADMINISTRATIVE | Facility: HOSPITAL | Age: 54
End: 2024-08-07
Payer: COMMERCIAL

## 2024-08-15 ENCOUNTER — OFFICE VISIT (OUTPATIENT)
Dept: FAMILY MEDICINE | Facility: CLINIC | Age: 54
End: 2024-08-15
Payer: COMMERCIAL

## 2024-08-15 VITALS
HEIGHT: 62 IN | WEIGHT: 249 LBS | HEART RATE: 82 BPM | DIASTOLIC BLOOD PRESSURE: 68 MMHG | SYSTOLIC BLOOD PRESSURE: 108 MMHG | OXYGEN SATURATION: 97 % | BODY MASS INDEX: 45.82 KG/M2

## 2024-08-15 DIAGNOSIS — L40.50 PSORIATIC ARTHRITIS: ICD-10-CM

## 2024-08-15 DIAGNOSIS — E66.01 CLASS 3 SEVERE OBESITY DUE TO EXCESS CALORIES WITH SERIOUS COMORBIDITY AND BODY MASS INDEX (BMI) OF 40.0 TO 44.9 IN ADULT: ICD-10-CM

## 2024-08-15 DIAGNOSIS — I10 ESSENTIAL HYPERTENSION: Primary | ICD-10-CM

## 2024-08-15 DIAGNOSIS — E78.2 MIXED HYPERLIPIDEMIA: ICD-10-CM

## 2024-08-15 PROBLEM — I47.10 PSVT (PAROXYSMAL SUPRAVENTRICULAR TACHYCARDIA): Status: RESOLVED | Noted: 2020-03-04 | Resolved: 2024-08-15

## 2024-08-15 PROCEDURE — 3078F DIAST BP <80 MM HG: CPT | Mod: CPTII,S$GLB,, | Performed by: FAMILY MEDICINE

## 2024-08-15 PROCEDURE — 99214 OFFICE O/P EST MOD 30 MIN: CPT | Mod: S$GLB,,, | Performed by: FAMILY MEDICINE

## 2024-08-15 PROCEDURE — 4010F ACE/ARB THERAPY RXD/TAKEN: CPT | Mod: CPTII,S$GLB,, | Performed by: FAMILY MEDICINE

## 2024-08-15 PROCEDURE — 3008F BODY MASS INDEX DOCD: CPT | Mod: CPTII,S$GLB,, | Performed by: FAMILY MEDICINE

## 2024-08-15 PROCEDURE — 1159F MED LIST DOCD IN RCRD: CPT | Mod: CPTII,S$GLB,, | Performed by: FAMILY MEDICINE

## 2024-08-15 PROCEDURE — 3074F SYST BP LT 130 MM HG: CPT | Mod: CPTII,S$GLB,, | Performed by: FAMILY MEDICINE

## 2024-08-15 RX ORDER — ROSUVASTATIN CALCIUM 20 MG/1
20 TABLET, COATED ORAL DAILY
Qty: 90 TABLET | Refills: 3 | Status: SHIPPED | OUTPATIENT
Start: 2024-08-15 | End: 2025-08-15

## 2024-08-15 RX ORDER — EZETIMIBE 10 MG/1
10 TABLET ORAL DAILY
Qty: 90 TABLET | Refills: 3 | Status: SHIPPED | OUTPATIENT
Start: 2024-08-15 | End: 2025-08-15

## 2024-08-15 RX ORDER — NAPROXEN SODIUM 220 MG/1
1 TABLET ORAL DAILY
Start: 2024-08-15 | End: 2025-08-15

## 2024-08-15 RX ORDER — LISINOPRIL AND HYDROCHLOROTHIAZIDE 12.5; 2 MG/1; MG/1
1 TABLET ORAL DAILY
Qty: 90 TABLET | Refills: 3 | Status: SHIPPED | OUTPATIENT
Start: 2024-08-15 | End: 2025-08-15

## 2024-08-15 NOTE — PROGRESS NOTES
SUBJECTIVE:    Patient ID: Oanh Marmolejo is a 54 y.o. female.    Chief Complaint: 4M Check Up / Lab Review    Patient with hypertension and hyperlipidemia in for follow-up visit.  Lipid panel has been repeated and patient continues with elevated triglycerides despite current therapy.  She tolerates rosuvastatin with no issues.  She is also continued on Zetia.      Blood pressure is well-controlled on current therapy    She continues to follow with her dermatologist for psoriatic arthritis in his doing well with Cosentyx.          Lab Visit on 08/06/2024   Component Date Value Ref Range Status    Cholesterol 08/06/2024 116 (L)  120 - 199 mg/dL Final    Triglycerides 08/06/2024 207 (H)  30 - 150 mg/dL Final    HDL 08/06/2024 33 (L)  40 - 75 mg/dL Final    LDL Cholesterol 08/06/2024 41.6 (L)  63.0 - 159.0 mg/dL Final    HDL/Cholesterol Ratio 08/06/2024 28.4  20.0 - 50.0 % Final    Total Cholesterol/HDL Ratio 08/06/2024 3.5  2.0 - 5.0 Final    Non-HDL Cholesterol 08/06/2024 83  mg/dL Final    Sodium 08/06/2024 138  136 - 145 mmol/L Final    Potassium 08/06/2024 4.1  3.5 - 5.1 mmol/L Final    Chloride 08/06/2024 103  95 - 110 mmol/L Final    CO2 08/06/2024 24  23 - 29 mmol/L Final    Glucose 08/06/2024 105  70 - 110 mg/dL Final    BUN 08/06/2024 16  6 - 20 mg/dL Final    Creatinine 08/06/2024 0.9  0.5 - 1.4 mg/dL Final    Calcium 08/06/2024 9.9  8.7 - 10.5 mg/dL Final    Total Protein 08/06/2024 7.3  6.0 - 8.4 g/dL Final    Albumin 08/06/2024 4.2  3.5 - 5.2 g/dL Final    Total Bilirubin 08/06/2024 1.2 (H)  0.1 - 1.0 mg/dL Final    Alkaline Phosphatase 08/06/2024 69  55 - 135 U/L Final    AST 08/06/2024 22  10 - 40 U/L Final    ALT 08/06/2024 22  10 - 44 U/L Final    eGFR 08/06/2024 >60  >60 mL/min/1.73 m^2 Final    Anion Gap 08/06/2024 11  8 - 16 mmol/L Final    Uric Acid 08/06/2024 5.5  2.4 - 5.7 mg/dL Final   Office Visit on 04/11/2024   Component Date Value Ref Range Status    POC Molecular Influenza A Ag  04/11/2024 Negative  Negative Final    POC Molecular Influenza B Ag 04/11/2024 Negative  Negative Final     Acceptable 04/11/2024 Yes   Final    POC Rapid COVID 04/11/2024 Negative  Negative Final     Acceptable 04/11/2024 Yes   Final   Lab Visit on 02/20/2024   Component Date Value Ref Range Status    TB Gold Plus 02/20/2024 Negative  Negative Final    TB1 - Nil 02/20/2024 0.06  IU/mL Final    TB2 - Nil 02/20/2024 0.07  IU/mL Final    Mitogen - Nil 02/20/2024 8.99  IU/mL Final    Quantiferon Nil Value 02/20/2024 0.04  IU/mL Final   Lab Visit on 02/16/2024   Component Date Value Ref Range Status    Hep A IgM 02/16/2024 Negative  Negative Final    Hepatitis B Surface Ag 02/16/2024 Negative  Negative Final    Hep B C IgM 02/16/2024 Negative  Negative Final    Hepatitis C Ab 02/16/2024 Negative  Negative Final    WBC 02/16/2024 4.96  3.90 - 12.70 K/uL Final    RBC 02/16/2024 4.33  4.00 - 5.40 M/uL Final    Hemoglobin 02/16/2024 12.1  12.0 - 16.0 g/dL Final    Hematocrit 02/16/2024 36.8 (L)  37.0 - 48.5 % Final    MCV 02/16/2024 85  82 - 98 fL Final    MCH 02/16/2024 27.9  27.0 - 31.0 pg Final    MCHC 02/16/2024 32.9  32.0 - 36.0 g/dL Final    RDW 02/16/2024 14.5  11.5 - 14.5 % Final    Platelets 02/16/2024 227  150 - 450 K/uL Final    MPV 02/16/2024 10.6  9.2 - 12.9 fL Final    Immature Granulocytes 02/16/2024 0.2  0.0 - 0.5 % Final    Gran # (ANC) 02/16/2024 3.3  1.8 - 7.7 K/uL Final    Immature Grans (Abs) 02/16/2024 0.01  0.00 - 0.04 K/uL Final    Lymph # 02/16/2024 1.3  1.0 - 4.8 K/uL Final    Mono # 02/16/2024 0.3  0.3 - 1.0 K/uL Final    Eos # 02/16/2024 0.1  0.0 - 0.5 K/uL Final    Baso # 02/16/2024 0.01  0.00 - 0.20 K/uL Final    nRBC 02/16/2024 0  0 /100 WBC Final    Gran % 02/16/2024 66.5  38.0 - 73.0 % Final    Lymph % 02/16/2024 25.2  18.0 - 48.0 % Final    Mono % 02/16/2024 6.5  4.0 - 15.0 % Final    Eosinophil % 02/16/2024 1.4  0.0 - 8.0 % Final    Basophil % 02/16/2024 0.2   0.0 - 1.9 % Final    Differential Method 02/16/2024 Automated   Final    Sodium 02/16/2024 139  136 - 145 mmol/L Final    Potassium 02/16/2024 3.9  3.5 - 5.1 mmol/L Final    Chloride 02/16/2024 105  95 - 110 mmol/L Final    CO2 02/16/2024 24  23 - 29 mmol/L Final    Glucose 02/16/2024 102  70 - 110 mg/dL Final    BUN 02/16/2024 16  6 - 20 mg/dL Final    Creatinine 02/16/2024 0.9  0.5 - 1.4 mg/dL Final    Calcium 02/16/2024 9.9  8.7 - 10.5 mg/dL Final    Total Protein 02/16/2024 7.6  6.0 - 8.4 g/dL Final    Albumin 02/16/2024 4.5  3.5 - 5.2 g/dL Final    Total Bilirubin 02/16/2024 1.5 (H)  0.1 - 1.0 mg/dL Final    Alkaline Phosphatase 02/16/2024 76  55 - 135 U/L Final    AST 02/16/2024 20  10 - 40 U/L Final    ALT 02/16/2024 24  10 - 44 U/L Final    eGFR 02/16/2024 >60  >60 mL/min/1.73 m^2 Final    Anion Gap 02/16/2024 10  8 - 16 mmol/L Final    Sed Rate 02/16/2024 15  0 - 20 mm/Hr Final    CRP 02/16/2024 2.1  0.0 - 8.2 mg/L Final        Past Medical History:   Diagnosis Date    Arthritis     Back pain     Degenerative disc disease     LIZBETH (generalized anxiety disorder)     Gout flare     High triglycerides     History of gout     Hyperlipidemia     Hypertension     Joint pain     Obese     Pain of left calf     Psoriasis     Swelling of lower extremity      Past Surgical History:   Procedure Laterality Date    COLONOSCOPY N/A 7/29/2022    Procedure: COLONOSCOPY;  Surgeon: Francy Watson MD;  Location: [facility];  Service: Endoscopy;  Laterality: N/A;    ESOPHAGOGASTRODUODENOSCOPY N/A 7/29/2022    Procedure: EGD (ESOPHAGOGASTRODUODENOSCOPY);  Surgeon: Francy Watson MD;  Location: [facility];  Service: Endoscopy;  Laterality: N/A;    NECK SURGERY       Family History   Problem Relation Name Age of Onset    Diabetes Father  70    Heart disease Father      Hyperlipidemia Father      Hypertension Father      ALS Father      Cancer Maternal Aunt      Cancer Paternal Uncle      Diabetes Maternal Grandmother       Diabetes Paternal Grandmother      Breast cancer Mother  74    Melanoma Neg Hx      Psoriasis Neg Hx      Lupus Neg Hx      Eczema Neg Hx         Marital Status:   Alcohol History:  reports current alcohol use.  Tobacco History:  reports that she has never smoked. She has never used smokeless tobacco.  Drug History:  reports no history of drug use.    Review of patient's allergies indicates:   Allergen Reactions    Pistachio nut        Current Outpatient Medications:     allopurinoL (ZYLOPRIM) 300 MG tablet, Take 1 tablet (300 mg total) by mouth once daily., Disp: 90 tablet, Rfl: 3    COSENTYX PEN, 2 PENS, 150 mg/mL PnIj, Inject 2 pens (300 mg) into the skin every 30 days., Disp: 2 mL, Rfl: 5    FLUoxetine 20 MG tablet, Take 1 tablet (20 mg total) by mouth once daily., Disp: 90 tablet, Rfl: 3    folic acid (FOLVITE) 1 MG tablet, Take 1 tablet (1 mg total) by mouth Daily., Disp: 90 tablet, Rfl: 3    ezetimibe (ZETIA) 10 mg tablet, Take 1 tablet (10 mg total) by mouth once daily., Disp: 90 tablet, Rfl: 3    lisinopriL-hydrochlorothiazide (PRINZIDE,ZESTORETIC) 20-12.5 mg per tablet, Take 1 tablet by mouth once daily., Disp: 90 tablet, Rfl: 3    omega 3-dha-epa-fish oil (FISH OIL) 1,200 (144-216) mg Cap, Take 1 capsule by mouth Daily., Disp: , Rfl:     rosuvastatin (CRESTOR) 20 MG tablet, Take 1 tablet (20 mg total) by mouth once daily., Disp: 90 tablet, Rfl: 3    Review of Systems   Constitutional:  Negative for activity change, fatigue and unexpected weight change.   HENT:  Negative for hearing loss, postnasal drip, sinus pressure, sore throat and voice change.    Eyes:  Negative for photophobia and visual disturbance.   Respiratory:  Negative for cough, shortness of breath and wheezing.    Cardiovascular:  Negative for chest pain and palpitations.   Gastrointestinal:  Negative for constipation, diarrhea and nausea.   Genitourinary:  Negative for difficulty urinating, frequency, hematuria and urgency.  "  Musculoskeletal:  Negative for arthralgias and back pain.   Skin:  Negative for rash.   Neurological:  Negative for weakness, light-headedness and headaches.   Hematological:  Negative for adenopathy. Does not bruise/bleed easily.   Psychiatric/Behavioral:  The patient is not nervous/anxious.           Objective:          8/15/2024     3:48 PM 9/4/2024     8:48 AM   Vitals - 1 value per visit   SYSTOLIC 108 108   DIASTOLIC 68 78   Pulse 82    SPO2 97 %    Weight (lb) 249 242   Weight (kg) 112.946 109.77   Height 5' 2" (1.575 m) 5' 2" (1.575 m)   BMI (Calculated) 45.5 44.3   Waist Measurements  47 in      Physical Exam  Constitutional:       Appearance: Normal appearance. She is morbidly obese.   HENT:      Head: Normocephalic and atraumatic.      Mouth/Throat:      Mouth: Mucous membranes are moist.   Eyes:      Conjunctiva/sclera: Conjunctivae normal.   Pulmonary:      Effort: Pulmonary effort is normal.   Neurological:      General: No focal deficit present.      Mental Status: She is alert and oriented to person, place, and time.   Psychiatric:         Mood and Affect: Mood normal.         Behavior: Behavior normal.           Assessment:       1. Essential hypertension    2. Mixed hyperlipidemia    3. Psoriatic arthritis    4. Class 3 severe obesity due to excess calories with serious comorbidity and body mass index (BMI) of 40.0 to 44.9 in adult         Plan:       Essential hypertension  -     lisinopriL-hydrochlorothiazide (PRINZIDE,ZESTORETIC) 20-12.5 mg per tablet; Take 1 tablet by mouth once daily.  Dispense: 90 tablet; Refill: 3    Mixed hyperlipidemia  -     omega 3-dha-epa-fish oil (FISH OIL) 1,200 (144-216) mg Cap; Take 1 capsule by mouth Daily.  -     ezetimibe (ZETIA) 10 mg tablet; Take 1 tablet (10 mg total) by mouth once daily.  Dispense: 90 tablet; Refill: 3  -     rosuvastatin (CRESTOR) 20 MG tablet; Take 1 tablet (20 mg total) by mouth once daily.  Dispense: 90 tablet; Refill: 3    Psoriatic " arthritis  Comments:  Continue Cosentyx    Class 3 severe obesity due to excess calories with serious comorbidity and body mass index (BMI) of 40.0 to 44.9 in adult  Comments:  Nutritional guidance provided      Medication List with Changes/Refills   New Medications    OMEGA 3-DHA-EPA-FISH OIL (FISH OIL) 1,200 (144-216) MG CAP    Take 1 capsule by mouth Daily.   Current Medications    ALLOPURINOL (ZYLOPRIM) 300 MG TABLET    Take 1 tablet (300 mg total) by mouth once daily.    COSENTYX PEN, 2 PENS, 150 MG/ML PNIJ    Inject 2 pens (300 mg) into the skin every 30 days.    FLUOXETINE 20 MG TABLET    Take 1 tablet (20 mg total) by mouth once daily.    FOLIC ACID (FOLVITE) 1 MG TABLET    Take 1 tablet (1 mg total) by mouth Daily.   Changed and/or Refilled Medications    Modified Medication Previous Medication    EZETIMIBE (ZETIA) 10 MG TABLET ezetimibe (ZETIA) 10 mg tablet       Take 1 tablet (10 mg total) by mouth once daily.    Take 1 tablet (10 mg total) by mouth once daily.    LISINOPRIL-HYDROCHLOROTHIAZIDE (PRINZIDE,ZESTORETIC) 20-12.5 MG PER TABLET lisinopriL-hydrochlorothiazide (PRINZIDE,ZESTORETIC) 20-12.5 mg per tablet       Take 1 tablet by mouth once daily.    Take 1 tablet by mouth once daily.    ROSUVASTATIN (CRESTOR) 20 MG TABLET rosuvastatin (CRESTOR) 20 MG tablet       Take 1 tablet (20 mg total) by mouth once daily.    Take 1 tablet (20 mg total) by mouth once daily.   Discontinued Medications    FLUOCINONIDE (LIDEX) 0.05 % EXTERNAL SOLUTION    Apply topically 2 (two) times daily.    SEMAGLUTIDE SUBQ    Inject into the skin.      Follow up in about 6 months (around 2/15/2025) for HTN.      Stable on medications continue current

## 2024-09-04 ENCOUNTER — CLINICAL SUPPORT (OUTPATIENT)
Dept: OTHER | Facility: CLINIC | Age: 54
End: 2024-09-04
Payer: COMMERCIAL

## 2024-09-04 DIAGNOSIS — Z00.8 ENCOUNTER FOR OTHER GENERAL EXAMINATION: ICD-10-CM

## 2024-09-04 PROCEDURE — 82947 ASSAY GLUCOSE BLOOD QUANT: CPT | Mod: QW,S$GLB,, | Performed by: INTERNAL MEDICINE

## 2024-09-04 PROCEDURE — 99401 PREV MED CNSL INDIV APPRX 15: CPT | Mod: S$GLB,,, | Performed by: INTERNAL MEDICINE

## 2024-09-04 PROCEDURE — 80061 LIPID PANEL: CPT | Mod: QW,S$GLB,, | Performed by: INTERNAL MEDICINE

## 2024-09-06 VITALS
BODY MASS INDEX: 44.53 KG/M2 | SYSTOLIC BLOOD PRESSURE: 108 MMHG | WEIGHT: 242 LBS | HEIGHT: 62 IN | DIASTOLIC BLOOD PRESSURE: 78 MMHG

## 2024-09-06 LAB
HDLC SERPL-MCNC: 24 MG/DL
POC CHOLESTEROL, LDL (DOCK): 49 MG/DL
POC CHOLESTEROL, TOTAL: 112 MG/DL
POC GLUCOSE, FASTING: 102 MG/DL (ref 60–110)
TRIGL SERPL-MCNC: 247 MG/DL

## 2024-09-13 DIAGNOSIS — M25.50 ARTHRALGIA, UNSPECIFIED JOINT: ICD-10-CM

## 2024-09-13 DIAGNOSIS — L40.9 PSORIASIS: ICD-10-CM

## 2024-09-13 DIAGNOSIS — I10 ESSENTIAL HYPERTENSION: ICD-10-CM

## 2024-09-13 DIAGNOSIS — L40.50 PSORIATIC ARTHRITIS: ICD-10-CM

## 2024-09-16 RX ORDER — SECUKINUMAB 150 MG/ML
300 INJECTION SUBCUTANEOUS
Qty: 2 ML | Refills: 5 | Status: ACTIVE | OUTPATIENT
Start: 2024-09-16 | End: 2024-10-20

## 2024-09-19 DIAGNOSIS — E78.2 MIXED HYPERLIPIDEMIA: ICD-10-CM

## 2024-09-20 RX ORDER — EZETIMIBE 10 MG/1
10 TABLET ORAL DAILY
Qty: 90 TABLET | Refills: 3 | Status: SHIPPED | OUTPATIENT
Start: 2024-09-20 | End: 2025-09-20

## 2024-09-20 NOTE — TELEPHONE ENCOUNTER
prescription sent to   [id number] - [place] - [address]  [address]  [place] [address]  Phone: [phone] Fax: [phone]

## 2024-10-08 ENCOUNTER — PATIENT MESSAGE (OUTPATIENT)
Dept: FAMILY MEDICINE | Facility: CLINIC | Age: 54
End: 2024-10-08
Payer: COMMERCIAL

## 2024-10-08 DIAGNOSIS — Z12.31 ENCOUNTER FOR SCREENING MAMMOGRAM FOR MALIGNANT NEOPLASM OF BREAST: Primary | ICD-10-CM

## 2024-10-15 ENCOUNTER — LAB VISIT (OUTPATIENT)
Dept: LAB | Facility: HOSPITAL | Age: 54
End: 2024-10-15
Attending: INTERNAL MEDICINE
Payer: COMMERCIAL

## 2024-10-15 DIAGNOSIS — L40.50 PSORIATIC ARTHRITIS: ICD-10-CM

## 2024-10-15 DIAGNOSIS — L40.9 PSORIASIS: ICD-10-CM

## 2024-10-15 DIAGNOSIS — D84.9 IMMUNOSUPPRESSION: ICD-10-CM

## 2024-10-15 LAB
ALT SERPL W/O P-5'-P-CCNC: 22 U/L (ref 10–44)
AST SERPL-CCNC: 22 U/L (ref 10–40)
BASOPHILS # BLD AUTO: 0.01 K/UL (ref 0–0.2)
BASOPHILS NFR BLD: 0.2 % (ref 0–1.9)
CRP SERPL-MCNC: 2.4 MG/L (ref 0–8.2)
DIFFERENTIAL METHOD BLD: ABNORMAL
EOSINOPHIL # BLD AUTO: 0.1 K/UL (ref 0–0.5)
EOSINOPHIL NFR BLD: 1.8 % (ref 0–8)
ERYTHROCYTE [DISTWIDTH] IN BLOOD BY AUTOMATED COUNT: 13.8 % (ref 11.5–14.5)
ERYTHROCYTE [SEDIMENTATION RATE] IN BLOOD BY WESTERGREN METHOD: 15 MM/HR (ref 0–20)
HCT VFR BLD AUTO: 36.2 % (ref 37–48.5)
HGB BLD-MCNC: 11.9 G/DL (ref 12–16)
IMM GRANULOCYTES # BLD AUTO: 0.01 K/UL (ref 0–0.04)
IMM GRANULOCYTES NFR BLD AUTO: 0.2 % (ref 0–0.5)
LYMPHOCYTES # BLD AUTO: 1.4 K/UL (ref 1–4.8)
LYMPHOCYTES NFR BLD: 28.1 % (ref 18–48)
MCH RBC QN AUTO: 28.1 PG (ref 27–31)
MCHC RBC AUTO-ENTMCNC: 32.9 G/DL (ref 32–36)
MCV RBC AUTO: 85 FL (ref 82–98)
MONOCYTES # BLD AUTO: 0.4 K/UL (ref 0.3–1)
MONOCYTES NFR BLD: 7.4 % (ref 4–15)
NEUTROPHILS # BLD AUTO: 3.1 K/UL (ref 1.8–7.7)
NEUTROPHILS NFR BLD: 62.3 % (ref 38–73)
NRBC BLD-RTO: 0 /100 WBC
PLATELET # BLD AUTO: 233 K/UL (ref 150–450)
PMV BLD AUTO: 11 FL (ref 9.2–12.9)
RBC # BLD AUTO: 4.24 M/UL (ref 4–5.4)
WBC # BLD AUTO: 4.98 K/UL (ref 3.9–12.7)

## 2024-10-15 PROCEDURE — 36415 COLL VENOUS BLD VENIPUNCTURE: CPT | Performed by: INTERNAL MEDICINE

## 2024-10-15 PROCEDURE — 84450 TRANSFERASE (AST) (SGOT): CPT | Performed by: INTERNAL MEDICINE

## 2024-10-15 PROCEDURE — 86140 C-REACTIVE PROTEIN: CPT | Performed by: INTERNAL MEDICINE

## 2024-10-15 PROCEDURE — 85025 COMPLETE CBC W/AUTO DIFF WBC: CPT | Performed by: INTERNAL MEDICINE

## 2024-10-15 PROCEDURE — 84460 ALANINE AMINO (ALT) (SGPT): CPT | Performed by: INTERNAL MEDICINE

## 2024-10-15 PROCEDURE — 85651 RBC SED RATE NONAUTOMATED: CPT | Performed by: INTERNAL MEDICINE

## 2024-10-23 ENCOUNTER — OFFICE VISIT (OUTPATIENT)
Dept: RHEUMATOLOGY | Facility: CLINIC | Age: 54
End: 2024-10-23
Payer: COMMERCIAL

## 2024-10-23 VITALS
DIASTOLIC BLOOD PRESSURE: 70 MMHG | BODY MASS INDEX: 44.63 KG/M2 | HEIGHT: 62 IN | SYSTOLIC BLOOD PRESSURE: 111 MMHG | HEART RATE: 75 BPM | WEIGHT: 242.5 LBS

## 2024-10-23 DIAGNOSIS — L40.9 PSORIASIS: ICD-10-CM

## 2024-10-23 DIAGNOSIS — Z79.899 HIGH RISK MEDICATIONS (NOT ANTICOAGULANTS) LONG-TERM USE: ICD-10-CM

## 2024-10-23 DIAGNOSIS — D84.9 IMMUNOSUPPRESSION: ICD-10-CM

## 2024-10-23 DIAGNOSIS — L40.50 PSORIATIC ARTHRITIS: Primary | ICD-10-CM

## 2024-10-23 PROCEDURE — 4010F ACE/ARB THERAPY RXD/TAKEN: CPT | Mod: CPTII,S$GLB,, | Performed by: INTERNAL MEDICINE

## 2024-10-23 PROCEDURE — 3078F DIAST BP <80 MM HG: CPT | Mod: CPTII,S$GLB,, | Performed by: INTERNAL MEDICINE

## 2024-10-23 PROCEDURE — 3074F SYST BP LT 130 MM HG: CPT | Mod: CPTII,S$GLB,, | Performed by: INTERNAL MEDICINE

## 2024-10-23 PROCEDURE — 99215 OFFICE O/P EST HI 40 MIN: CPT | Mod: S$GLB,,, | Performed by: INTERNAL MEDICINE

## 2024-10-23 PROCEDURE — 3008F BODY MASS INDEX DOCD: CPT | Mod: CPTII,S$GLB,, | Performed by: INTERNAL MEDICINE

## 2024-10-23 PROCEDURE — 1159F MED LIST DOCD IN RCRD: CPT | Mod: CPTII,S$GLB,, | Performed by: INTERNAL MEDICINE

## 2024-10-23 PROCEDURE — 99999 PR PBB SHADOW E&M-EST. PATIENT-LVL III: CPT | Mod: PBBFAC,,, | Performed by: INTERNAL MEDICINE

## 2024-10-23 ASSESSMENT — ROUTINE ASSESSMENT OF PATIENT INDEX DATA (RAPID3)
FATIGUE SCORE: 2.2
PSYCHOLOGICAL DISTRESS SCORE: 0
PATIENT GLOBAL ASSESSMENT SCORE: 0.5
TOTAL RAPID3 SCORE: 1.28
MDHAQ FUNCTION SCORE: 0.4
PAIN SCORE: 2

## 2024-10-23 NOTE — PATIENT INSTRUCTIONS
Ibuprofen 600mg twice daily  Ice for 20 minutes after use/or if painful.   Get some wrist splints for gym.   If not getting better we can have Sports Medicine inject.

## 2024-10-23 NOTE — PROGRESS NOTES
RHEUMATOLOGY CLINIC FOLLOW UP VISIT      Chief complaints:- management of PsA       HPI:-  Oanh Bliss a 54 y.o. pleasant female with PMH of PsA ( on Cosentyx) and gout comes in for an initial visit with above chief complaints.   Left middle PIP joint with stiffness and swelling slightly improved this visit.  Present over 1 year  stiffness and +pain. Persists despite therapy.   Rheumatological history  Patient was diagnosed with PsA in   Was previously on Cosentyx was discontinued it since quarantine due to COVID concerns.  Failed Enbrel, Humira, MTX (tolerated but ineffective) .   Previously following Dr. Zack West (rheumatologist, [place])  Initial was gout as youth, then Ps, and wandering oligoarticular polyarthritis.     Cosentyx was discontinued around 2020 due to COVID concern.  She has been back on Cosentyx  300mg every 28 days since approx 2021    Ibuprofen 800mg PRN.      Pain 1/10, toes. ? If this is gout.     Fhx:  No psoriasis, thyroid disease, or IBD.      Tobacco (denies), EtOH (social), recreational/illicit drugs (social)     Occupation: Admin     Denies Hx of clots/miscarriages -       ROS: Denies any mouth ulcers, Raynaud's,  photosensitivity, unintentional weight loss, vision changes, SOB, CP, joint swelling, arthralgia, myalgia, urinary/bowel symptoms, N/V, fever/chills, Sicca symptoms, recent travels/sick contacts, depression, insomnia, hair loss    Diffused psoriasis - all over.  Currently on OTC cortisone cream for the itch. Bilateral ankle and L 3rd digit joint swell.  Diffused arthralgia.   Hyperpigmentation forearms not new but present for years.       Review of Systems   Constitutional:  Negative for chills, diaphoresis, fever, malaise/fatigue and weight loss.   HENT:  Negative for congestion, ear discharge, ear pain, hearing loss, nosebleeds, sinus pain and tinnitus.    Eyes:  Negative for photophobia, pain, discharge and redness.   Respiratory:   Negative for cough, hemoptysis, sputum production, shortness of breath, wheezing and stridor.    Cardiovascular:  Negative for chest pain, palpitations, orthopnea, claudication, leg swelling and PND.   Gastrointestinal:  Negative for abdominal pain, constipation, diarrhea, heartburn, nausea and vomiting.   Genitourinary:  Negative for dysuria, frequency, hematuria and urgency.   Musculoskeletal:  Negative for back pain, joint pain, myalgias and neck pain.   Skin:  Positive for itching and rash.   Neurological:  Negative for dizziness, tingling, tremors, weakness and headaches.   Endo/Heme/Allergies:  Does not bruise/bleed easily.   Psychiatric/Behavioral:  Negative for depression, hallucinations and suicidal ideas. The patient is not nervous/anxious and does not have insomnia.        Past Medical History:   Diagnosis Date    Arthritis     Back pain     Degenerative disc disease     LIZBETH (generalized anxiety disorder)     Gout flare     High triglycerides     History of gout     Hyperlipidemia     Hypertension     Joint pain     Obese     Pain of left calf     Psoriasis     Swelling of lower extremity        Past Surgical History:   Procedure Laterality Date    COLONOSCOPY N/A 7/29/2022    Procedure: COLONOSCOPY;  Surgeon: Francy Watson MD;  Location: [facility];  Service: Endoscopy;  Laterality: N/A;    ESOPHAGOGASTRODUODENOSCOPY N/A 7/29/2022    Procedure: EGD (ESOPHAGOGASTRODUODENOSCOPY);  Surgeon: Francy Watson MD;  Location: [facility];  Service: Endoscopy;  Laterality: N/A;    NECK SURGERY          Social History     Tobacco Use    Smoking status: Never    Smokeless tobacco: Never   Substance Use Topics    Alcohol use: Yes    Drug use: No       Family History   Problem Relation Name Age of Onset    Diabetes Father  70    Heart disease Father      Hyperlipidemia Father      Hypertension Father      ALS Father      Cancer Maternal Aunt      Cancer Paternal Uncle      Diabetes Maternal Grandmother       "Diabetes Paternal Grandmother      Breast cancer Mother  74    Melanoma Neg Hx      Psoriasis Neg Hx      Lupus Neg Hx      Eczema Neg Hx         Review of patient's allergies indicates:   Allergen Reactions    Pistachio nut        Vitals:    10/23/24 1603   BP: 111/70   Pulse: 75   Weight: 110 kg (242 lb 8.1 oz)   Height: 5' 2" (1.575 m)   PainSc: 0-No pain       Physical Exam  Constitutional:       Comments: Obese    HENT:      Head: Normocephalic and atraumatic.   Eyes:      Conjunctiva/sclera: Conjunctivae normal.      Pupils: Pupils are equal, round, and reactive to light.   Cardiovascular:      Rate and Rhythm: Normal rate and regular rhythm.      Heart sounds: Normal heart sounds.   Pulmonary:      Effort: Pulmonary effort is normal.      Breath sounds: Normal breath sounds.   Abdominal:      General: Bowel sounds are normal.      Palpations: Abdomen is soft.   Musculoskeletal:         General: Normal range of motion.      Cervical back: Normal range of motion and neck supple.      Comments: Bilateral ankle synovitis - improvement   L 3rd digit dactylitis      Skin:     General: Skin is warm and dry.      Comments: Diffused large psoriatic plaques in bilateral UE/LE, scalp, chest, back, ears - improving from last time      Neurological:      Mental Status: She is alert and oriented to person, place, and time.      Gait: Gait is intact.   Psychiatric:         Mood and Affect: Mood and affect normal.         Cognition and Memory: Memory normal.         Judgment: Judgment normal.               Medication List with Changes/Refills   Current Medications    ALLOPURINOL (ZYLOPRIM) 300 MG TABLET    Take 1 tablet (300 mg total) by mouth once daily.    COSENTYX PEN, 2 PENS, 150 MG/ML PNIJ    Inject 2 pens (300 mg) into the skin every 30 days.    EZETIMIBE (ZETIA) 10 MG TABLET    Take 1 tablet (10 mg total) by mouth once daily.    FLUOXETINE 20 MG TABLET    Take 1 tablet (20 mg total) by mouth once daily.    FOLIC ACID " (FOLVITE) 1 MG TABLET    Take 1 tablet (1 mg total) by mouth Daily.    LISINOPRIL-HYDROCHLOROTHIAZIDE (PRINZIDE,ZESTORETIC) 20-12.5 MG PER TABLET    Take 1 tablet by mouth once daily.    OMEGA 3-DHA-EPA-FISH OIL (FISH OIL) 1,200 (144-216) MG CAP    Take 1 capsule by mouth Daily.    ROSUVASTATIN (CRESTOR) 20 MG TABLET    Take 1 tablet (20 mg total) by mouth once daily.     Psoriatic arthritis  -     ALT (SGPT); Future; Expected date: 10/23/2024  -     AST (SGOT); Future; Expected date: 10/23/2024  -     CBC Auto Differential; Future; Expected date: 10/23/2024  -     C-Reactive Protein; Future; Expected date: 10/23/2024  -     Creatinine, Serum; Future; Expected date: 10/23/2024  -     Sedimentation rate; Future; Expected date: 10/23/2024  -     Quantiferon Gold TB; Future; Expected date: 10/23/2024  -     Sedimentation rate; Future; Expected date: 10/23/2024  -     C-Reactive Protein; Standing; Expected date: 10/23/2024    Psoriasis  -     ALT (SGPT); Future; Expected date: 10/23/2024  -     AST (SGOT); Future; Expected date: 10/23/2024  -     CBC Auto Differential; Future; Expected date: 10/23/2024  -     C-Reactive Protein; Future; Expected date: 10/23/2024  -     Creatinine, Serum; Future; Expected date: 10/23/2024  -     Sedimentation rate; Future; Expected date: 10/23/2024  -     Quantiferon Gold TB; Future; Expected date: 10/23/2024  -     Sedimentation rate; Future; Expected date: 10/23/2024  -     C-Reactive Protein; Standing; Expected date: 10/23/2024    Immunosuppression  -     ALT (SGPT); Future; Expected date: 10/23/2024  -     AST (SGOT); Future; Expected date: 10/23/2024  -     CBC Auto Differential; Future; Expected date: 10/23/2024  -     C-Reactive Protein; Future; Expected date: 10/23/2024  -     Creatinine, Serum; Future; Expected date: 10/23/2024  -     Sedimentation rate; Future; Expected date: 10/23/2024  -     Quantiferon Gold TB; Future; Expected date: 10/23/2024  -     Sedimentation rate;  Future; Expected date: 10/23/2024  -     C-Reactive Protein; Standing; Expected date: 10/23/2024    High risk medications (not anticoagulants) long-term use  -     ALT (SGPT); Future; Expected date: 10/23/2024  -     AST (SGOT); Future; Expected date: 10/23/2024  -     CBC Auto Differential; Future; Expected date: 10/23/2024  -     C-Reactive Protein; Future; Expected date: 10/23/2024  -     Creatinine, Serum; Future; Expected date: 10/23/2024  -     Sedimentation rate; Future; Expected date: 10/23/2024  -     Quantiferon Gold TB; Future; Expected date: 10/23/2024  -     Sedimentation rate; Future; Expected date: 10/23/2024  -     C-Reactive Protein; Standing; Expected date: 10/23/2024        Assessment/Plans:-  52 y.o. pleasant female with PMH of PsA (previously on Cosentyx) and gout comes in for f/u for management of PsA.      Had failed multiple medications in the past - Enbrel, Humira, and MTX.      Plan  - cbc, cmp, sed and c-reactive protein every 3 months. ad failed topical clobetasol in the past  - Ibuprofen 800mg TID PRN for arthralgia - to be taken with food   - Cosentyx 300mg 2 pens -continue   -right 2nd MTP discussed trying     TB 2022 neg  Hepatitis 2022 neg.       Otitis externa with ps: Start today.Cipro (antibiotic) mixed with steroids - start with this small bottle twice daily for 7 days.  - did resolve with Cipro.     Lidex- is not helping with ears which are mild but discussed that before we switch biologic for <7% BSA want her to discuss with derm what would be best option. If it continues or worsens certainly we can switch but the joints are doing GREAT!>       No follow-ups on file.       40min consultation with greater than 50% of that time included Preparing to see the patient (review records, tests), Obtaining and/or reviewing separately obtained historical data, Performing a medically appropriate examination and/or evaluation , Ordering medications, tests, and/or procedures, Referring and  communicating with other healthcare professionals , Documenting clinical information in the electronic or other health record and Independently interpreting results  (as warranted) & communicating results to the patient/family/caregiver. All questions answered.

## 2024-11-11 ENCOUNTER — HOSPITAL ENCOUNTER (OUTPATIENT)
Dept: RADIOLOGY | Facility: HOSPITAL | Age: 54
Discharge: HOME | End: 2024-11-11
Attending: FAMILY MEDICINE
Payer: COMMERCIAL

## 2024-11-11 DIAGNOSIS — Z12.31 ENCOUNTER FOR SCREENING MAMMOGRAM FOR MALIGNANT NEOPLASM OF BREAST: ICD-10-CM

## 2024-11-11 PROCEDURE — 77067 SCR MAMMO BI INCL CAD: CPT | Mod: 26,,, | Performed by: RADIOLOGY

## 2024-11-11 PROCEDURE — 77063 BREAST TOMOSYNTHESIS BI: CPT | Mod: 26,,, | Performed by: RADIOLOGY

## 2024-11-11 PROCEDURE — 77067 SCR MAMMO BI INCL CAD: CPT | Mod: TC,PO

## 2024-12-24 ENCOUNTER — TELEPHONE (OUTPATIENT)
Dept: FAMILY MEDICINE | Facility: CLINIC | Age: 54
End: 2024-12-24
Payer: COMMERCIAL

## 2024-12-24 NOTE — TELEPHONE ENCOUNTER
Spoke with patient c/o wrist pain, unsure if gout.  Requesting steroid pack given previously but unsure of name.  Patient to report to urgent care -DN

## 2024-12-24 NOTE — TELEPHONE ENCOUNTER
----- Message from Chika sent at 12/24/2024  9:50 AM CST -----  The patient has severe pain in her RT wrist.  Can you call in an antiinflammatory. Dr. Coronel has called in one for her before. [facility] [address]. Please call pt's # [phone] GH

## 2024-12-24 NOTE — TELEPHONE ENCOUNTER
----- Message from Sara sent at 12/24/2024 10:10 AM CST -----  Steroid pack is the one that was used for gout.   [phone]

## 2025-01-16 ENCOUNTER — PATIENT MESSAGE (OUTPATIENT)
Dept: FAMILY MEDICINE | Facility: CLINIC | Age: 55
End: 2025-01-16
Payer: COMMERCIAL

## 2025-01-17 ENCOUNTER — TELEPHONE (OUTPATIENT)
Dept: FAMILY MEDICINE | Facility: CLINIC | Age: 55
End: 2025-01-17
Payer: COMMERCIAL

## 2025-01-17 ENCOUNTER — PATIENT MESSAGE (OUTPATIENT)
Dept: FAMILY MEDICINE | Facility: CLINIC | Age: 55
End: 2025-01-17
Payer: COMMERCIAL

## 2025-01-17 ENCOUNTER — PATIENT MESSAGE (OUTPATIENT)
Dept: ADMINISTRATIVE | Facility: OTHER | Age: 55
End: 2025-01-17
Payer: COMMERCIAL

## 2025-01-17 DIAGNOSIS — I10 ESSENTIAL HYPERTENSION: ICD-10-CM

## 2025-01-17 DIAGNOSIS — F41.1 GAD (GENERALIZED ANXIETY DISORDER): ICD-10-CM

## 2025-01-17 DIAGNOSIS — Z13.29 SCREENING FOR ENDOCRINE DISORDER: ICD-10-CM

## 2025-01-17 DIAGNOSIS — M10.9 GOUT, UNSPECIFIED CAUSE, UNSPECIFIED CHRONICITY, UNSPECIFIED SITE: ICD-10-CM

## 2025-01-17 DIAGNOSIS — Z01.419 ROUTINE GYNECOLOGICAL EXAMINATION: Primary | ICD-10-CM

## 2025-01-17 DIAGNOSIS — E78.2 MIXED HYPERLIPIDEMIA: Primary | ICD-10-CM

## 2025-01-17 RX ORDER — ALLOPURINOL 300 MG/1
300 TABLET ORAL DAILY
Qty: 90 TABLET | Refills: 3 | Status: SHIPPED | OUTPATIENT
Start: 2025-01-17

## 2025-01-17 RX ORDER — FLUOXETINE 20 MG/1
20 TABLET ORAL DAILY
Qty: 90 TABLET | Refills: 3 | Status: SHIPPED | OUTPATIENT
Start: 2025-01-17

## 2025-01-17 RX ORDER — FOLIC ACID 1 MG/1
1 TABLET ORAL DAILY
Qty: 90 TABLET | Refills: 3 | Status: SHIPPED | OUTPATIENT
Start: 2025-01-17

## 2025-01-17 NOTE — TELEPHONE ENCOUNTER
The patient's prescription has been approved and sent to   [id number] - [place], [place] - [address]  [address]  [place] [address]  Phone: [phone] Fax: [phone]     Duration Of Freeze Thaw-Cycle (Seconds): 5 Show Applicator Variable?: Yes Render Note In Bullet Format When Appropriate: No Number Of Freeze-Thaw Cycles: 3 freeze-thaw cycles Detail Level: Detailed Consent: The patient's consent was obtained including but not limited to risks of crusting, scabbing, blistering, scarring, darker or lighter pigmentary change, recurrence, incomplete removal and infection. Application Tool (Optional): Liquid Nitrogen Sprayer Post-Care Instructions: I reviewed with the patient in detail post-care instructions. Patient is to wear sunprotection, and avoid picking at any of the treated lesions. Pt may apply Vaseline to crusted or scabbing areas.

## 2025-01-31 ENCOUNTER — LAB VISIT (OUTPATIENT)
Dept: LAB | Facility: HOSPITAL | Age: 55
End: 2025-01-31
Attending: FAMILY MEDICINE
Payer: COMMERCIAL

## 2025-01-31 DIAGNOSIS — E78.2 MIXED HYPERLIPIDEMIA: ICD-10-CM

## 2025-01-31 DIAGNOSIS — I10 ESSENTIAL HYPERTENSION: ICD-10-CM

## 2025-01-31 DIAGNOSIS — Z13.29 SCREENING FOR ENDOCRINE DISORDER: ICD-10-CM

## 2025-01-31 LAB
ALBUMIN SERPL BCP-MCNC: 4.4 G/DL (ref 3.5–5.2)
ALP SERPL-CCNC: 77 U/L (ref 40–150)
ALT SERPL W/O P-5'-P-CCNC: 17 U/L (ref 10–44)
ANION GAP SERPL CALC-SCNC: 10 MMOL/L (ref 8–16)
AST SERPL-CCNC: 18 U/L (ref 10–40)
BILIRUB SERPL-MCNC: 1.3 MG/DL (ref 0.1–1)
BUN SERPL-MCNC: 19 MG/DL (ref 6–20)
CALCIUM SERPL-MCNC: 10 MG/DL (ref 8.7–10.5)
CHLORIDE SERPL-SCNC: 105 MMOL/L (ref 95–110)
CHOLEST SERPL-MCNC: 117 MG/DL (ref 120–199)
CHOLEST/HDLC SERPL: 3.3 {RATIO} (ref 2–5)
CO2 SERPL-SCNC: 25 MMOL/L (ref 23–29)
CREAT SERPL-MCNC: 0.9 MG/DL (ref 0.5–1.4)
EST. GFR  (NO RACE VARIABLE): >60 ML/MIN/1.73 M^2
GLUCOSE SERPL-MCNC: 101 MG/DL (ref 70–110)
HDLC SERPL-MCNC: 36 MG/DL (ref 40–75)
HDLC SERPL: 30.8 % (ref 20–50)
LDLC SERPL CALC-MCNC: 46.6 MG/DL (ref 63–159)
NONHDLC SERPL-MCNC: 81 MG/DL
POTASSIUM SERPL-SCNC: 3.8 MMOL/L (ref 3.5–5.1)
PROT SERPL-MCNC: 7.8 G/DL (ref 6–8.4)
SODIUM SERPL-SCNC: 140 MMOL/L (ref 136–145)
TRIGL SERPL-MCNC: 172 MG/DL (ref 30–150)
TSH SERPL DL<=0.005 MIU/L-ACNC: 1.85 UIU/ML (ref 0.4–4)

## 2025-01-31 PROCEDURE — 84443 ASSAY THYROID STIM HORMONE: CPT | Performed by: FAMILY MEDICINE

## 2025-01-31 PROCEDURE — 80053 COMPREHEN METABOLIC PANEL: CPT | Performed by: FAMILY MEDICINE

## 2025-01-31 PROCEDURE — 80061 LIPID PANEL: CPT | Performed by: FAMILY MEDICINE

## 2025-01-31 PROCEDURE — 36415 COLL VENOUS BLD VENIPUNCTURE: CPT | Performed by: FAMILY MEDICINE

## 2025-02-19 ENCOUNTER — OFFICE VISIT (OUTPATIENT)
Dept: FAMILY MEDICINE | Facility: CLINIC | Age: 55
End: 2025-02-19
Payer: COMMERCIAL

## 2025-02-19 VITALS
WEIGHT: 237 LBS | DIASTOLIC BLOOD PRESSURE: 70 MMHG | RESPIRATION RATE: 18 BRPM | HEART RATE: 77 BPM | BODY MASS INDEX: 43.61 KG/M2 | HEIGHT: 62 IN | OXYGEN SATURATION: 99 % | SYSTOLIC BLOOD PRESSURE: 112 MMHG

## 2025-02-19 DIAGNOSIS — M10.9 GOUT, UNSPECIFIED CAUSE, UNSPECIFIED CHRONICITY, UNSPECIFIED SITE: ICD-10-CM

## 2025-02-19 DIAGNOSIS — E66.813 CLASS 3 SEVERE OBESITY DUE TO EXCESS CALORIES WITH SERIOUS COMORBIDITY AND BODY MASS INDEX (BMI) OF 45.0 TO 49.9 IN ADULT: ICD-10-CM

## 2025-02-19 DIAGNOSIS — E66.01 CLASS 3 SEVERE OBESITY DUE TO EXCESS CALORIES WITH SERIOUS COMORBIDITY AND BODY MASS INDEX (BMI) OF 45.0 TO 49.9 IN ADULT: ICD-10-CM

## 2025-02-19 DIAGNOSIS — Z00.00 ANNUAL PHYSICAL EXAM: Primary | ICD-10-CM

## 2025-02-19 DIAGNOSIS — L40.50 PSORIATIC ARTHRITIS: ICD-10-CM

## 2025-02-19 DIAGNOSIS — E78.2 MIXED HYPERLIPIDEMIA: ICD-10-CM

## 2025-02-19 DIAGNOSIS — F41.1 GAD (GENERALIZED ANXIETY DISORDER): ICD-10-CM

## 2025-02-19 DIAGNOSIS — I10 ESSENTIAL HYPERTENSION: ICD-10-CM

## 2025-02-19 RX ORDER — FOLIC ACID 1 MG/1
1 TABLET ORAL DAILY
Qty: 90 TABLET | Refills: 3 | Status: SHIPPED | OUTPATIENT
Start: 2025-02-19

## 2025-02-19 RX ORDER — ROSUVASTATIN CALCIUM 20 MG/1
20 TABLET, COATED ORAL DAILY
Qty: 90 TABLET | Refills: 3 | Status: SHIPPED | OUTPATIENT
Start: 2025-02-19 | End: 2026-02-19

## 2025-02-19 RX ORDER — EZETIMIBE 10 MG/1
10 TABLET ORAL DAILY
Qty: 90 TABLET | Refills: 3 | Status: SHIPPED | OUTPATIENT
Start: 2025-02-19 | End: 2026-02-19

## 2025-02-19 RX ORDER — ALLOPURINOL 300 MG/1
300 TABLET ORAL DAILY
Qty: 90 TABLET | Refills: 3 | Status: SHIPPED | OUTPATIENT
Start: 2025-02-19

## 2025-02-19 RX ORDER — FLUOXETINE 20 MG/1
20 TABLET ORAL DAILY
Qty: 90 TABLET | Refills: 3 | Status: SHIPPED | OUTPATIENT
Start: 2025-02-19

## 2025-02-19 RX ORDER — LISINOPRIL AND HYDROCHLOROTHIAZIDE 12.5; 2 MG/1; MG/1
1 TABLET ORAL DAILY
Qty: 90 TABLET | Refills: 3 | Status: SHIPPED | OUTPATIENT
Start: 2025-02-19 | End: 2026-02-19

## 2025-02-19 NOTE — PROGRESS NOTES
SUBJECTIVE:   HPI: Oanh Marmolejo  is a 54 y.o. female who presents for annual physical .   Follow-up (Brought bottles// refills needed//pt states she have no complaints today // pap is scheduled next week )    History of Present Illness    CHIEF COMPLAINT:  Patient presents today for an annual visit    WEIGHT MANAGEMENT:  She reports a 12-pound weight loss over the past 6 months, decreasing from 249 to 237 lbs. She maintains a consistent exercise routine, attending the gym five days weekly, including resistance training for different muscle groups and 25 minutes of treadmill activity per session. She has implemented healthier dietary changes and expresses interest in weight loss medication as an adjunct to current lifestyle modifications.    MEDICAL HISTORY:  She has multiple chronic conditions including hypertension, hyperlipidemia, anxiety disorder, and arthritis. She reports dry scalp but denies other skin issues. She denies recent gout flares.    CURRENT MEDICATIONS:  She continues Cosyntx with reported benefit, Fluoxetine 20mg for mood management with good effect, and Lisinopril-HCTZ for blood pressure control. She has discontinued fish oil supplementation.    PREVENTIVE CARE:  Cologuard screening in 2022 showed polyps, with next screening due in 2027. Recent mammogram was normal. She is up to date with tetanus vaccination and has an upcoming Pap smear scheduled.    PHYSICAL ACTIVITY:  She recently returned from [place] where she participated in hiking and mountain climbing.       Lab Visit on 01/31/2025   Component Date Value Ref Range Status    Sodium 01/31/2025 140  136 - 145 mmol/L Final    Potassium 01/31/2025 3.8  3.5 - 5.1 mmol/L Final    Chloride 01/31/2025 105  95 - 110 mmol/L Final    CO2 01/31/2025 25  23 - 29 mmol/L Final    Glucose 01/31/2025 101  70 - 110 mg/dL Final    BUN 01/31/2025 19  6 - 20 mg/dL Final    Creatinine 01/31/2025 0.9  0.5 - 1.4 mg/dL Final    Calcium 01/31/2025 10.0   8.7 - 10.5 mg/dL Final    Total Protein 01/31/2025 7.8  6.0 - 8.4 g/dL Final    Albumin 01/31/2025 4.4  3.5 - 5.2 g/dL Final    Total Bilirubin 01/31/2025 1.3 (H)  0.1 - 1.0 mg/dL Final    Alkaline Phosphatase 01/31/2025 77  40 - 150 U/L Final    AST 01/31/2025 18  10 - 40 U/L Final    ALT 01/31/2025 17  10 - 44 U/L Final    eGFR 01/31/2025 >60  >60 mL/min/1.73 m^2 Final    Anion Gap 01/31/2025 10  8 - 16 mmol/L Final    Cholesterol 01/31/2025 117 (L)  120 - 199 mg/dL Final    Triglycerides 01/31/2025 172 (H)  30 - 150 mg/dL Final    HDL 01/31/2025 36 (L)  40 - 75 mg/dL Final    LDL Cholesterol 01/31/2025 46.6 (L)  63.0 - 159.0 mg/dL Final    HDL/Cholesterol Ratio 01/31/2025 30.8  20.0 - 50.0 % Final    Total Cholesterol/HDL Ratio 01/31/2025 3.3  2.0 - 5.0 Final    Non-HDL Cholesterol 01/31/2025 81  mg/dL Final    TSH 01/31/2025 1.851  0.400 - 4.000 uIU/mL Final   Lab Visit on 10/15/2024   Component Date Value Ref Range Status    WBC 10/15/2024 4.98  3.90 - 12.70 K/uL Final    RBC 10/15/2024 4.24  4.00 - 5.40 M/uL Final    Hemoglobin 10/15/2024 11.9 (L)  12.0 - 16.0 g/dL Final    Hematocrit 10/15/2024 36.2 (L)  37.0 - 48.5 % Final    MCV 10/15/2024 85  82 - 98 fL Final    MCH 10/15/2024 28.1  27.0 - 31.0 pg Final    MCHC 10/15/2024 32.9  32.0 - 36.0 g/dL Final    RDW 10/15/2024 13.8  11.5 - 14.5 % Final    Platelets 10/15/2024 233  150 - 450 K/uL Final    MPV 10/15/2024 11.0  9.2 - 12.9 fL Final    Immature Granulocytes 10/15/2024 0.2  0.0 - 0.5 % Final    Gran # (ANC) 10/15/2024 3.1  1.8 - 7.7 K/uL Final    Immature Grans (Abs) 10/15/2024 0.01  0.00 - 0.04 K/uL Final    Lymph # 10/15/2024 1.4  1.0 - 4.8 K/uL Final    Mono # 10/15/2024 0.4  0.3 - 1.0 K/uL Final    Eos # 10/15/2024 0.1  0.0 - 0.5 K/uL Final    Baso # 10/15/2024 0.01  0.00 - 0.20 K/uL Final    nRBC 10/15/2024 0  0 /100 WBC Final    Gran % 10/15/2024 62.3  38.0 - 73.0 % Final    Lymph % 10/15/2024 28.1  18.0 - 48.0 % Final    Mono % 10/15/2024 7.4  4.0  - 15.0 % Final    Eosinophil % 10/15/2024 1.8  0.0 - 8.0 % Final    Basophil % 10/15/2024 0.2  0.0 - 1.9 % Final    Differential Method 10/15/2024 Automated   Final    AST 10/15/2024 22  10 - 40 U/L Final    ALT 10/15/2024 22  10 - 44 U/L Final    Sed Rate 10/15/2024 15  0 - 20 mm/Hr Final    CRP 10/15/2024 2.4  0.0 - 8.2 mg/L Final   Clinical Support on 09/04/2024   Component Date Value Ref Range Status    POC Cholesterol, Total 09/04/2024 112  <240 MG/DL Final    POC Cholesterol, HDL 09/04/2024 24 (L)  MG/DL Final    POC Cholesterol, LDL 09/04/2024 49  <160 MG/DL Final    POC Triglycerides 09/04/2024 247 (H)  <160 MG/DL Final    POC Glucose, Fasting 09/04/2024 102  60 - 110 MG/DL Final      (Not in a hospital admission)    Review of patient's allergies indicates:   Allergen Reactions    Pistachio nut      Medications Ordered Prior to Encounter[1]  Past Medical History:   Diagnosis Date    Arthritis     Back pain     Degenerative disc disease     LIZBETH (generalized anxiety disorder)     Gout flare     High triglycerides     History of gout     Hyperlipidemia     Hypertension     Joint pain     Obese     Pain of left calf     Psoriasis     Swelling of lower extremity      Past Surgical History:   Procedure Laterality Date    COLONOSCOPY N/A 7/29/2022    Procedure: COLONOSCOPY;  Surgeon: Francy Watson MD;  Location: [facility];  Service: Endoscopy;  Laterality: N/A;    ESOPHAGOGASTRODUODENOSCOPY N/A 7/29/2022    Procedure: EGD (ESOPHAGOGASTRODUODENOSCOPY);  Surgeon: Francy Watson MD;  Location: [facility];  Service: Endoscopy;  Laterality: N/A;    NECK SURGERY       Family History   Problem Relation Name Age of Onset    Diabetes Father  70    Heart disease Father      Hyperlipidemia Father      Hypertension Father      ALS Father      Cancer Maternal Aunt      Cancer Paternal Uncle      Diabetes Maternal Grandmother      Diabetes Paternal Grandmother      Breast cancer Mother  74    Melanoma Neg Hx      Psoriasis  "Neg Hx      Lupus Neg Hx      Eczema Neg Hx       Social History[2]   Health Maintenance Topics with due status: Not Due       Topic Last Completion Date    TETANUS VACCINE 02/25/2021    Colorectal Cancer Screening 07/29/2022    Hemoglobin A1c (Diabetic Prevention Screening) 11/15/2023    Mammogram 11/11/2024    Lipid Panel 01/31/2025    RSV Vaccine (Age 60+ and Pregnant patients) Not Due     Immunization History   Administered Date(s) Administered    Influenza - Trivalent - Fluarix, Flulaval, Fluzone, Afluria - PF 01/13/2014    Tdap 02/25/2021       Review of Systems   Constitutional:  Negative for activity change and unexpected weight change.   HENT:  Negative for hearing loss, rhinorrhea and trouble swallowing.    Eyes:  Negative for discharge and visual disturbance.   Respiratory:  Negative for chest tightness and wheezing.    Cardiovascular:  Negative for chest pain and palpitations.   Gastrointestinal:  Negative for blood in stool, constipation, diarrhea and vomiting.   Endocrine: Negative for polydipsia and polyuria.   Genitourinary:  Negative for difficulty urinating, dysuria, hematuria and menstrual problem.   Musculoskeletal:  Positive for arthralgias and joint swelling. Negative for neck pain.   Neurological:  Negative for weakness and headaches.   Psychiatric/Behavioral:  Negative for confusion and dysphoric mood.       OBJECTIVE:          10/23/2024     4:03 PM 2/19/2025     3:48 PM   Vitals - 1 value per visit   SYSTOLIC 111 112   DIASTOLIC 70 70   Pulse 75 77   Resp  18   SPO2  99 %   Weight (lb) 242.51 237   Weight (kg) 110 107.502   Height 5' 2" (1.575 m) 5' 2" (1.575 m)   BMI (Calculated) 44.3 43.3   Pain Score Zero Zero      Physical Exam  Constitutional:       Appearance: Normal appearance.   HENT:      Head: Normocephalic and atraumatic.      Mouth/Throat:      Mouth: Mucous membranes are moist.   Eyes:      Conjunctiva/sclera: Conjunctivae normal.   Cardiovascular:      Rate and Rhythm: Normal " rate and regular rhythm.   Pulmonary:      Effort: Pulmonary effort is normal.   Neurological:      General: No focal deficit present.      Mental Status: She is alert and oriented to person, place, and time.   Psychiatric:         Mood and Affect: Mood normal.         Behavior: Behavior normal.          Assessment:       Assessment & Plan    IMPRESSION:  - Assessed patient's annual visit with focus on hypertension, hyperlipidemia, and anxiety disorder  - Noted weight loss progress: 12 lbs down in last 6 months  - Reviewed labs results, noting improved lipid panel with lowered triglycerides  - Considered potential medication adjustments based on continued improvement in cholesterol levels  - Evaluated current medication regimen for blood pressure, mood, and arthritis management  - Discussed weight loss medication options, pending insurance coverage  - Confirmed up-to-date status on preventive screenings including colon cancer, mammogram, and upcoming Pap smear    OBESITY, CLASS 3:  - Monitored patient's weight, noting a 12-pound decrease from 249 to 237 lbs over 6 months.  - Evaluated this weight loss positively, acknowledging it as very good progress.  - Discussed the need for continued weight management and commended the patient's lifestyle changes, including gym attendance 5 days a week and healthier eating habits.  - Discussed the possibility of weight loss medication, pending insurance approval.  - Instructed the patient to contact insurance to inquire about coverage for these medications.    PSORIATIC ARTHRITIS:  - Inquired about the status of arthritis and skin conditions, evaluating that arthritis is stable and the skin condition is minor.  - Assessed that arthritis remains stable on current medication.  - Continued Cosentyx treatment for both arthritis and skin symptoms.    HYPERTENSION:  - Patient's blood pressure is currently well-controlled.  - Continue lisinopril hydrochlorothiazide for blood pressure  management.    HYPERLIPIDEMIA:  - Cholesterol levels have improved, with triglycerides decreasing by 30 points.  - Hyperlipidemia is improving with dietary changes and exercise.  - Continue current treatment and lifestyle changes.  - Possibility of reducing medication in the future if improvement continues.  - Explained the relationship between exercise and HDL cholesterol levels.    ANXIETY DISORDER:  - Patient's anxiety is under control with the current medication.  - Continue fluoxetine 20 mg daily for anxiety management.    COLON POLYPS:  - Patient is up to date with colon cancer screening and has a history of polyps.  - Recommend next colon cancer screening in 5 years, in 2027.    OTHER INSTRUCTIONS:  - Discussed the importance of maintaining current exercise and dietary habits for continued health improvement.    FOLLOW UP:  - Follow up in 6 months.  - Ensure Dr. Washington sends a copy of the Pap smear report after the upcoming appointment.           Plan:       Annual physical exam    Gout, unspecified cause, unspecified chronicity, unspecified site  -     allopurinoL (ZYLOPRIM) 300 MG tablet; Take 1 tablet (300 mg total) by mouth once daily.  Dispense: 90 tablet; Refill: 3    Mixed hyperlipidemia  -     ezetimibe (ZETIA) 10 mg tablet; Take 1 tablet (10 mg total) by mouth once daily.  Dispense: 90 tablet; Refill: 3  -     rosuvastatin (CRESTOR) 20 MG tablet; Take 1 tablet (20 mg total) by mouth once daily.  Dispense: 90 tablet; Refill: 3    LIZBETH (generalized anxiety disorder)  -     FLUoxetine 20 MG tablet; Take 1 tablet (20 mg total) by mouth once daily.  Dispense: 90 tablet; Refill: 3    Essential hypertension  -     folic acid (FOLVITE) 1 MG tablet; Take 1 tablet (1 mg total) by mouth Daily.  Dispense: 90 tablet; Refill: 3  -     lisinopriL-hydrochlorothiazide (PRINZIDE,ZESTORETIC) 20-12.5 mg per tablet; Take 1 tablet by mouth once daily.  Dispense: 90 tablet; Refill: 3    Class 3 severe obesity due to excess  calories with serious comorbidity and body mass index (BMI) of 45.0 to 49.9 in adult    Psoriatic arthritis        Counseled on age and gender appropriate medical preventative services, including cancer screenings, immunizations, overall nutritional health, need for a consistent exercise regimen and an overall push towards maintaining a vigorous and active lifestyle.      Follow up in about 6 months (around 8/19/2025) for HTN.        This note was generated with the assistance of ambient listening technology. Verbal consent was obtained by the patient and accompanying visitor(s) for the recording of patient appointment to facilitate this note. I attest to having reviewed and edited the generated note for accuracy, though some syntax or spelling errors may persist. Please contact the author of this note for any clarification.                   [1]   Current Outpatient Medications on File Prior to Visit   Medication Sig Dispense Refill    COSENTYX PEN, 2 PENS, 150 mg/mL PnIj Inject 2 pens (300 mg) into the skin every 30 days. 2 mL 5    [DISCONTINUED] allopurinoL (ZYLOPRIM) 300 MG tablet Take 1 tablet (300 mg total) by mouth once daily. 90 tablet 3    [DISCONTINUED] ezetimibe (ZETIA) 10 mg tablet Take 1 tablet (10 mg total) by mouth once daily. 90 tablet 3    [DISCONTINUED] FLUoxetine 20 MG tablet Take 1 tablet (20 mg total) by mouth once daily. 90 tablet 3    [DISCONTINUED] folic acid (FOLVITE) 1 MG tablet Take 1 tablet (1 mg total) by mouth Daily. 90 tablet 3    [DISCONTINUED] lisinopriL-hydrochlorothiazide (PRINZIDE,ZESTORETIC) 20-12.5 mg per tablet Take 1 tablet by mouth once daily. 90 tablet 3    [DISCONTINUED] rosuvastatin (CRESTOR) 20 MG tablet Take 1 tablet (20 mg total) by mouth once daily. 90 tablet 3    [DISCONTINUED] omega 3-dha-epa-fish oil (FISH OIL) 1,200 (144-216) mg Cap Take 1 capsule by mouth Daily. (Patient not taking: Reported on 2/19/2025)       No current facility-administered medications on file  prior to visit.   [2]   Social History  Tobacco Use    Smoking status: Never    Smokeless tobacco: Never   Substance Use Topics    Alcohol use: Yes    Drug use: No

## 2025-03-03 LAB
HPV APTIMA: NORMAL
PAP RECOMMENDATION EXT: NORMAL
PAP SMEAR: NORMAL

## 2025-03-17 DIAGNOSIS — L40.50 PSORIATIC ARTHRITIS: ICD-10-CM

## 2025-03-17 DIAGNOSIS — L40.9 PSORIASIS: ICD-10-CM

## 2025-03-17 DIAGNOSIS — I10 ESSENTIAL HYPERTENSION: ICD-10-CM

## 2025-03-17 DIAGNOSIS — M25.50 ARTHRALGIA, UNSPECIFIED JOINT: ICD-10-CM

## 2025-03-17 RX ORDER — SECUKINUMAB 150 MG/ML
300 INJECTION SUBCUTANEOUS
Qty: 2 ML | Refills: 5 | Status: ACTIVE | OUTPATIENT
Start: 2025-03-17 | End: 2025-04-16

## 2025-03-21 ENCOUNTER — TELEPHONE (OUTPATIENT)
Dept: PHARMACY | Facility: CLINIC | Age: 55
End: 2025-03-21
Payer: COMMERCIAL

## 2025-03-22 NOTE — TELEPHONE ENCOUNTER
[facility]/Population Health Chart Review & Patient Outreach Details For Medication Adherence Project    Reason for Outreach Encounter: 3rd Party payor non-compliance report ([employer], [employer], [employer], etc)  2.  Patient Outreach Method: Reviewed patient chart   3.   Medication in question:    Hyperlipidemia Medications              ezetimibe (ZETIA) 10 mg tablet Take 1 tablet (10 mg total) by mouth once daily.    rosuvastatin (CRESTOR) 20 MG tablet Take 1 tablet (20 mg total) by mouth once daily.                  rosuvastatin  last filled  3/6/25 for 90 day supply      4.  Reviewed and or Updates Made To: Patient Chart  5. Outreach Outcomes and/or actions taken: Patient filled medication and is on track to be adherent  Additional Notes:

## 2025-04-02 ENCOUNTER — LAB VISIT (OUTPATIENT)
Dept: LAB | Facility: HOSPITAL | Age: 55
End: 2025-04-02
Attending: INTERNAL MEDICINE
Payer: COMMERCIAL

## 2025-04-02 DIAGNOSIS — L40.9 PSORIASIS: ICD-10-CM

## 2025-04-02 DIAGNOSIS — D84.9 IMMUNOSUPPRESSION: ICD-10-CM

## 2025-04-02 DIAGNOSIS — Z79.899 HIGH RISK MEDICATIONS (NOT ANTICOAGULANTS) LONG-TERM USE: ICD-10-CM

## 2025-04-02 DIAGNOSIS — L40.50 PSORIATIC ARTHRITIS: ICD-10-CM

## 2025-04-02 LAB
ABSOLUTE EOSINOPHIL (SMH): 0.1 K/UL
ABSOLUTE MONOCYTE (SMH): 0.41 K/UL (ref 0.3–1)
ABSOLUTE NEUTROPHIL COUNT (SMH): 3.3 K/UL (ref 1.8–7.7)
ALT SERPL-CCNC: 14 UNIT/L (ref 10–44)
AST SERPL-CCNC: 21 UNIT/L (ref 11–45)
BASOPHILS # BLD AUTO: 0.02 K/UL
BASOPHILS NFR BLD AUTO: 0.4 %
CREAT SERPL-MCNC: 0.8 MG/DL (ref 0.5–1.4)
CRP SERPL-MCNC: 3 MG/L
ERYTHROCYTE [DISTWIDTH] IN BLOOD BY AUTOMATED COUNT: 14.1 % (ref 11.5–14.5)
ERYTHROCYTE [SEDIMENTATION RATE] IN BLOOD: 33 MM/HR (ref 0–20)
GFR SERPLBLD CREATININE-BSD FMLA CKD-EPI: >60 ML/MIN/1.73/M2
HCT VFR BLD AUTO: 35.6 % (ref 37–48.5)
HGB BLD-MCNC: 11.7 GM/DL (ref 12–16)
IMM GRANULOCYTES # BLD AUTO: 0.01 K/UL (ref 0–0.04)
IMM GRANULOCYTES NFR BLD AUTO: 0.2 % (ref 0–0.5)
LYMPHOCYTES # BLD AUTO: 1.39 K/UL (ref 1–4.8)
MCH RBC QN AUTO: 27.3 PG (ref 27–31)
MCHC RBC AUTO-ENTMCNC: 32.9 G/DL (ref 32–36)
MCV RBC AUTO: 83 FL (ref 82–98)
NUCLEATED RBC (/100WBC) (SMH): 0 /100 WBC
PLATELET # BLD AUTO: 229 K/UL (ref 150–450)
PMV BLD AUTO: 10.5 FL (ref 9.2–12.9)
RBC # BLD AUTO: 4.29 M/UL (ref 4–5.4)
RELATIVE EOSINOPHIL (SMH): 1.9 % (ref 0–8)
RELATIVE LYMPHOCYTE (SMH): 26.5 % (ref 18–48)
RELATIVE MONOCYTE (SMH): 7.8 % (ref 4–15)
RELATIVE NEUTROPHIL (SMH): 63.2 % (ref 38–73)
WBC # BLD AUTO: 5.25 K/UL (ref 3.9–12.7)

## 2025-04-02 PROCEDURE — 82565 ASSAY OF CREATININE: CPT

## 2025-04-02 PROCEDURE — 36415 COLL VENOUS BLD VENIPUNCTURE: CPT

## 2025-04-02 PROCEDURE — 84450 TRANSFERASE (AST) (SGOT): CPT

## 2025-04-02 PROCEDURE — 84460 ALANINE AMINO (ALT) (SGPT): CPT

## 2025-04-02 PROCEDURE — 85651 RBC SED RATE NONAUTOMATED: CPT

## 2025-04-02 PROCEDURE — 85025 COMPLETE CBC W/AUTO DIFF WBC: CPT

## 2025-04-02 PROCEDURE — 86480 TB TEST CELL IMMUN MEASURE: CPT

## 2025-04-02 PROCEDURE — 86140 C-REACTIVE PROTEIN: CPT

## 2025-04-04 ENCOUNTER — TELEPHONE (OUTPATIENT)
Dept: RHEUMATOLOGY | Facility: CLINIC | Age: 55
End: 2025-04-04
Payer: COMMERCIAL

## 2025-04-04 NOTE — TELEPHONE ENCOUNTER
----- Message from Josie sent at 4/4/2025  1:40 PM CDT -----  Contact: self  Type: Needs Medical AdviceWho Called:  the patient Best Call Back Number: [phone] Information: pt is getting off of work earlier than expected on her appt 4/8 may she come in early for her appt or does she have to sit outside for 2 hours? Please call to advise

## 2025-04-05 LAB
GAMMA INTERFERON BACKGROUND BLD IA-ACNC: 0.03 IU/ML
M TB IFN-G BLD-IMP: NEGATIVE
M TB IFN-G CD4+ BCKGRND COR BLD-ACNC: 0.09 IU/ML
M TB IFN-G CD4+CD8+ BCKGRND COR BLD-ACNC: 0.08 IU/ML
MITOGEN IGNF BCKGRD COR BLD-ACNC: 9.52 IU/ML

## 2025-04-08 ENCOUNTER — OFFICE VISIT (OUTPATIENT)
Dept: RHEUMATOLOGY | Facility: CLINIC | Age: 55
End: 2025-04-08
Payer: COMMERCIAL

## 2025-04-08 VITALS
DIASTOLIC BLOOD PRESSURE: 71 MMHG | WEIGHT: 235.44 LBS | BODY MASS INDEX: 43.32 KG/M2 | SYSTOLIC BLOOD PRESSURE: 107 MMHG | HEIGHT: 62 IN | HEART RATE: 86 BPM

## 2025-04-08 DIAGNOSIS — B35.3 TINEA PEDIS OF LEFT FOOT: ICD-10-CM

## 2025-04-08 DIAGNOSIS — L40.50 PSORIATIC ARTHRITIS: Primary | ICD-10-CM

## 2025-04-08 DIAGNOSIS — L40.9 PSORIASIS: ICD-10-CM

## 2025-04-08 DIAGNOSIS — D84.9 IMMUNOSUPPRESSION: ICD-10-CM

## 2025-04-08 PROCEDURE — 1159F MED LIST DOCD IN RCRD: CPT | Mod: CPTII,S$GLB,, | Performed by: INTERNAL MEDICINE

## 2025-04-08 PROCEDURE — 99215 OFFICE O/P EST HI 40 MIN: CPT | Mod: S$GLB,,, | Performed by: INTERNAL MEDICINE

## 2025-04-08 PROCEDURE — 3074F SYST BP LT 130 MM HG: CPT | Mod: CPTII,S$GLB,, | Performed by: INTERNAL MEDICINE

## 2025-04-08 PROCEDURE — 3078F DIAST BP <80 MM HG: CPT | Mod: CPTII,S$GLB,, | Performed by: INTERNAL MEDICINE

## 2025-04-08 PROCEDURE — 3008F BODY MASS INDEX DOCD: CPT | Mod: CPTII,S$GLB,, | Performed by: INTERNAL MEDICINE

## 2025-04-08 PROCEDURE — 4010F ACE/ARB THERAPY RXD/TAKEN: CPT | Mod: CPTII,S$GLB,, | Performed by: INTERNAL MEDICINE

## 2025-04-08 PROCEDURE — 99999 PR PBB SHADOW E&M-EST. PATIENT-LVL III: CPT | Mod: PBBFAC,,, | Performed by: INTERNAL MEDICINE

## 2025-04-08 RX ORDER — TOLNAFTATE 10 MG/G
CREAM TOPICAL 2 TIMES DAILY
Qty: 35.4 G | Refills: 0 | Status: SHIPPED | OUTPATIENT
Start: 2025-04-08 | End: 2025-04-29

## 2025-04-08 ASSESSMENT — ROUTINE ASSESSMENT OF PATIENT INDEX DATA (RAPID3)
MDHAQ FUNCTION SCORE: 0.2
PSYCHOLOGICAL DISTRESS SCORE: 0
FATIGUE SCORE: 1.1
TOTAL RAPID3 SCORE: 1.72
PATIENT GLOBAL ASSESSMENT SCORE: 1
PAIN SCORE: 3.5

## 2025-04-08 NOTE — PROGRESS NOTES
RHEUMATOLOGY CLINIC FOLLOW UP VISIT      Chief complaints:- management of PsA       HPI:-  Oanh Bliss a 54 y.o. pleasant female with PMH of PsA ( on Cosentyx) and gout comes in for an initial visit with above chief complaints.   Left middle PIP joint with stiffness and swelling slightly improved this visit.  Present over 1 year  stiffness and +pain. Persists despite therapy.   Rheumatological history  Patient was diagnosed with PsA in   Was previously on Cosentyx was discontinued it since quarantine due to COVID concerns.  Failed Enbrel, Humira, MTX (tolerated but ineffective) .   Previously following Dr. Zack West (rheumatologist, [place])  Initial was gout as youth, then Ps, and wandering oligoarticular polyarthritis.     Cosentyx was discontinued around 2020 due to COVID concern.  She has been back on Cosentyx  300mg every 28 days since approx 2021    Ibuprofen 800mg PRN.      Pain 1/10, toes. ? If this is gout.     Fhx:  No psoriasis, thyroid disease, or IBD.      Tobacco (denies), EtOH (social), recreational/illicit drugs (social)     Occupation: Admin     Denies Hx of clots/miscarriages -       ROS: Denies any mouth ulcers, Raynaud's,  photosensitivity, unintentional weight loss, vision changes, SOB, CP, joint swelling, arthralgia, myalgia, urinary/bowel symptoms, N/V, fever/chills, Sicca symptoms, recent travels/sick contacts, depression, insomnia, hair loss    Diffused psoriasis - all over.  Currently on OTC cortisone cream for the itch. Bilateral ankle and L 3rd digit joint swell.  Diffused arthralgia.   Hyperpigmentation forearms not new but present for years.       Review of Systems   Constitutional:  Negative for chills, diaphoresis, fever, malaise/fatigue and weight loss.   HENT:  Negative for congestion, ear discharge, ear pain, hearing loss, nosebleeds, sinus pain and tinnitus.    Eyes:  Negative for photophobia, pain, discharge and redness.   Respiratory:   Negative for cough, hemoptysis, sputum production, shortness of breath, wheezing and stridor.    Cardiovascular:  Negative for chest pain, palpitations, orthopnea, claudication, leg swelling and PND.   Gastrointestinal:  Negative for abdominal pain, constipation, diarrhea, heartburn, nausea and vomiting.   Genitourinary:  Negative for dysuria, frequency, hematuria and urgency.   Musculoskeletal:  Negative for back pain, joint pain, myalgias and neck pain.   Skin:  Positive for itching and rash.   Neurological:  Negative for dizziness, tingling, tremors, weakness and headaches.   Endo/Heme/Allergies:  Does not bruise/bleed easily.   Psychiatric/Behavioral:  Negative for depression, hallucinations and suicidal ideas. The patient is not nervous/anxious and does not have insomnia.        Past Medical History:   Diagnosis Date    Arthritis     Back pain     Degenerative disc disease     LIZBETH (generalized anxiety disorder)     Gout flare     High triglycerides     History of gout     Hyperlipidemia     Hypertension     Joint pain     Obese     Pain of left calf     Psoriasis     Swelling of lower extremity        Past Surgical History:   Procedure Laterality Date    COLONOSCOPY N/A 7/29/2022    Procedure: COLONOSCOPY;  Surgeon: Francy Watson MD;  Location: [facility];  Service: Endoscopy;  Laterality: N/A;    ESOPHAGOGASTRODUODENOSCOPY N/A 7/29/2022    Procedure: EGD (ESOPHAGOGASTRODUODENOSCOPY);  Surgeon: Francy Watson MD;  Location: [facility];  Service: Endoscopy;  Laterality: N/A;    NECK SURGERY          Social History     Tobacco Use    Smoking status: Never    Smokeless tobacco: Never   Substance Use Topics    Alcohol use: Yes    Drug use: No       Family History   Problem Relation Name Age of Onset    Diabetes Father  70    Heart disease Father      Hyperlipidemia Father      Hypertension Father      ALS Father      Cancer Maternal Aunt      Cancer Paternal Uncle      Diabetes Maternal Grandmother       "Diabetes Paternal Grandmother      Breast cancer Mother  74    Melanoma Neg Hx      Psoriasis Neg Hx      Lupus Neg Hx      Eczema Neg Hx         Review of patient's allergies indicates:   Allergen Reactions    Pistachio nut        Vitals:    04/08/25 1127   BP: 107/71   Pulse: 86   Weight: 106.8 kg (235 lb 7.2 oz)   Height: 5' 2" (1.575 m)   PainSc:   4   PainLoc: Foot       Physical Exam  Constitutional:       Comments: Obese    HENT:      Head: Normocephalic and atraumatic.   Eyes:      Conjunctiva/sclera: Conjunctivae normal.      Pupils: Pupils are equal, round, and reactive to light.   Cardiovascular:      Rate and Rhythm: Normal rate and regular rhythm.      Heart sounds: Normal heart sounds.   Pulmonary:      Effort: Pulmonary effort is normal.      Breath sounds: Normal breath sounds.   Abdominal:      General: Bowel sounds are normal.      Palpations: Abdomen is soft.   Musculoskeletal:         General: Normal range of motion.      Cervical back: Normal range of motion and neck supple.      Comments: Bilateral ankle synovitis - improvement   L 3rd digit dactylitis      Skin:     General: Skin is warm and dry.      Comments: Diffused large psoriatic plaques in bilateral UE/LE, scalp, chest, back, ears - improving from last time      Neurological:      Mental Status: She is alert and oriented to person, place, and time.      Gait: Gait is intact.   Psychiatric:         Mood and Affect: Mood and affect normal.         Cognition and Memory: Memory normal.         Judgment: Judgment normal.               Medication List with Changes/Refills   Current Medications    ALLOPURINOL (ZYLOPRIM) 300 MG TABLET    Take 1 tablet (300 mg total) by mouth once daily.    COSENTYX PEN, 2 PENS, 150 MG/ML PNIJ    Inject 2 pens (300 mg) into the skin every 30 days.    EZETIMIBE (ZETIA) 10 MG TABLET    Take 1 tablet (10 mg total) by mouth once daily.    FLUOXETINE 20 MG TABLET    Take 1 tablet (20 mg total) by mouth once daily.    " FOLIC ACID (FOLVITE) 1 MG TABLET    Take 1 tablet (1 mg total) by mouth Daily.    LISINOPRIL-HYDROCHLOROTHIAZIDE (PRINZIDE,ZESTORETIC) 20-12.5 MG PER TABLET    Take 1 tablet by mouth once daily.    ROSUVASTATIN (CRESTOR) 20 MG TABLET    Take 1 tablet (20 mg total) by mouth once daily.     Psoriatic arthritis    Psoriasis    Immunosuppression    Tinea pedis of left foot    Other orders  -     tolnaftate (TINACTIN) 1 % cream; Apply topically 2 (two) times daily. for 21 days  Dispense: 35.4 g; Refill: 0          1,2,3 Assessment/Plans:-  54 y.o. pleasant female with PMH of PsA (previously on Cosentyx) and gout comes in for f/u for management of PsA.      Had failed multiple medications in the past - Enbrel, Humira, and MTX.     Plan  - cbc, cmp, sed and c-reactive protein every 3 months. ad failed topical clobetasol in the past  - Ibuprofen 800mg TID PRN for arthralgia - to be taken with food   - Cosentyx 300mg 2 pens -continue   -right 2nd MTP discussed trying     TB 2025 neg        4. Tinea trial 2-3 weeks with tinactin as this may not be psoriasis on foot.     No follow-ups on file.       40min consultation with greater than 50% of that time included Preparing to see the patient (review records, tests), Obtaining and/or reviewing separately obtained historical data, Performing a medically appropriate examination and/or evaluation , Ordering medications, tests, and/or procedures, Referring and communicating with other healthcare professionals , Documenting clinical information in the electronic or other health record and Independently interpreting results  (as warranted) & communicating results to the patient/family/caregiver. All questions answered.

## 2025-04-21 ENCOUNTER — PATIENT OUTREACH (OUTPATIENT)
Dept: ADMINISTRATIVE | Facility: HOSPITAL | Age: 55
End: 2025-04-21
Payer: COMMERCIAL

## 2025-05-22 NOTE — PATIENT INSTRUCTIONS

## 2025-06-05 DIAGNOSIS — L40.50 PSORIATIC ARTHRITIS: Primary | ICD-10-CM

## 2025-06-06 RX ORDER — SECUKINUMAB 150 MG/ML
300 INJECTION SUBCUTANEOUS
Qty: 2 EACH | Refills: 11 | Status: SHIPPED | OUTPATIENT
Start: 2025-06-06 | End: 2026-06-06

## 2025-06-12 ENCOUNTER — PATIENT MESSAGE (OUTPATIENT)
Dept: RHEUMATOLOGY | Facility: CLINIC | Age: 55
End: 2025-06-12
Payer: COMMERCIAL

## 2025-09-03 ENCOUNTER — TELEPHONE (OUTPATIENT)
Dept: FAMILY MEDICINE | Facility: CLINIC | Age: 55
End: 2025-09-03
Payer: COMMERCIAL

## 2025-09-03 DIAGNOSIS — Z00.00 ANNUAL PHYSICAL EXAM: ICD-10-CM

## 2025-09-03 DIAGNOSIS — E78.2 MIXED HYPERLIPIDEMIA: ICD-10-CM

## 2025-09-03 DIAGNOSIS — I10 ESSENTIAL HYPERTENSION: ICD-10-CM

## 2025-09-03 DIAGNOSIS — Z79.899 ENCOUNTER FOR LONG-TERM (CURRENT) USE OF MEDICATIONS: Primary | ICD-10-CM
